# Patient Record
Sex: FEMALE | Race: WHITE | NOT HISPANIC OR LATINO | Employment: OTHER | URBAN - METROPOLITAN AREA
[De-identification: names, ages, dates, MRNs, and addresses within clinical notes are randomized per-mention and may not be internally consistent; named-entity substitution may affect disease eponyms.]

---

## 2017-03-08 ENCOUNTER — HOSPITAL ENCOUNTER (OUTPATIENT)
Facility: MEDICAL CENTER | Age: 52
End: 2017-03-08
Attending: OBSTETRICS & GYNECOLOGY
Payer: COMMERCIAL

## 2017-03-08 PROCEDURE — 88175 CYTOPATH C/V AUTO FLUID REDO: CPT

## 2017-03-08 PROCEDURE — 87624 HPV HI-RISK TYP POOLED RSLT: CPT

## 2017-03-09 LAB
CYTOLOGY REG CYTOL: NORMAL
HPV HR 12 DNA CVX QL NAA+PROBE: NEGATIVE
HPV16 DNA SPEC QL NAA+PROBE: NEGATIVE
HPV18 DNA SPEC QL NAA+PROBE: NEGATIVE
SPECIMEN SOURCE: NORMAL

## 2017-04-19 ENCOUNTER — HOSPITAL ENCOUNTER (OUTPATIENT)
Dept: LAB | Facility: MEDICAL CENTER | Age: 52
End: 2017-04-19
Attending: NURSE PRACTITIONER
Payer: COMMERCIAL

## 2017-04-19 LAB — T3FREE SERPL-MCNC: 2.68 PG/ML (ref 2.4–4.2)

## 2017-04-19 PROCEDURE — 80061 LIPID PANEL: CPT

## 2017-04-19 PROCEDURE — 84481 FREE ASSAY (FT-3): CPT

## 2017-04-19 PROCEDURE — 83036 HEMOGLOBIN GLYCOSYLATED A1C: CPT

## 2017-04-19 PROCEDURE — 36415 COLL VENOUS BLD VENIPUNCTURE: CPT

## 2017-04-19 PROCEDURE — 86140 C-REACTIVE PROTEIN: CPT

## 2017-04-19 PROCEDURE — 82306 VITAMIN D 25 HYDROXY: CPT

## 2017-04-19 PROCEDURE — 84479 ASSAY OF THYROID (T3 OR T4): CPT

## 2017-04-19 PROCEDURE — 80053 COMPREHEN METABOLIC PANEL: CPT

## 2017-04-19 PROCEDURE — 83540 ASSAY OF IRON: CPT

## 2017-04-19 PROCEDURE — 84443 ASSAY THYROID STIM HORMONE: CPT

## 2017-04-19 PROCEDURE — 84439 ASSAY OF FREE THYROXINE: CPT

## 2017-04-19 PROCEDURE — 81003 URINALYSIS AUTO W/O SCOPE: CPT

## 2017-04-19 PROCEDURE — 85025 COMPLETE CBC W/AUTO DIFF WBC: CPT

## 2017-04-20 LAB
25(OH)D3 SERPL-MCNC: 28 NG/ML (ref 30–100)
ALBUMIN SERPL BCP-MCNC: 4.8 G/DL (ref 3.2–4.9)
ALBUMIN/GLOB SERPL: 1.5 G/DL
ALP SERPL-CCNC: 46 U/L (ref 30–99)
ALT SERPL-CCNC: 12 U/L (ref 2–50)
ANION GAP SERPL CALC-SCNC: 11 MMOL/L (ref 0–11.9)
APPEARANCE UR: CLEAR
AST SERPL-CCNC: 19 U/L (ref 12–45)
BASOPHILS # BLD AUTO: 0.9 % (ref 0–1.8)
BASOPHILS # BLD: 0.05 K/UL (ref 0–0.12)
BILIRUB SERPL-MCNC: 1.4 MG/DL (ref 0.1–1.5)
BILIRUB UR QL STRIP.AUTO: NEGATIVE
BUN SERPL-MCNC: 6 MG/DL (ref 8–22)
CALCIUM SERPL-MCNC: 9.8 MG/DL (ref 8.5–10.5)
CHLORIDE SERPL-SCNC: 103 MMOL/L (ref 96–112)
CHOLEST SERPL-MCNC: 223 MG/DL (ref 100–199)
CO2 SERPL-SCNC: 23 MMOL/L (ref 20–33)
COLOR UR: COLORLESS
CREAT SERPL-MCNC: 0.69 MG/DL (ref 0.5–1.4)
CRP SERPL HS-MCNC: 0.13 MG/DL (ref 0–0.75)
EOSINOPHIL # BLD AUTO: 0.04 K/UL (ref 0–0.51)
EOSINOPHIL NFR BLD: 0.7 % (ref 0–6.9)
ERYTHROCYTE [DISTWIDTH] IN BLOOD BY AUTOMATED COUNT: 48.4 FL (ref 35.9–50)
EST. AVERAGE GLUCOSE BLD GHB EST-MCNC: 108 MG/DL
GFR SERPL CREATININE-BSD FRML MDRD: >60 ML/MIN/1.73 M 2
GLOBULIN SER CALC-MCNC: 3.1 G/DL (ref 1.9–3.5)
GLUCOSE SERPL-MCNC: 89 MG/DL (ref 65–99)
GLUCOSE UR STRIP.AUTO-MCNC: NEGATIVE MG/DL
HBA1C MFR BLD: 5.4 % (ref 0–5.6)
HCT VFR BLD AUTO: 43.5 % (ref 37–47)
HDLC SERPL-MCNC: 88 MG/DL
HGB BLD-MCNC: 14.1 G/DL (ref 12–16)
IMM GRANULOCYTES # BLD AUTO: 0.01 K/UL (ref 0–0.11)
IMM GRANULOCYTES NFR BLD AUTO: 0.2 % (ref 0–0.9)
IRON SERPL-MCNC: 101 UG/DL (ref 40–170)
KETONES UR STRIP.AUTO-MCNC: NEGATIVE MG/DL
LDLC SERPL CALC-MCNC: 123 MG/DL
LEUKOCYTE ESTERASE UR QL STRIP.AUTO: NEGATIVE
LYMPHOCYTES # BLD AUTO: 1.43 K/UL (ref 1–4.8)
LYMPHOCYTES NFR BLD: 25.9 % (ref 22–41)
MCH RBC QN AUTO: 31.2 PG (ref 27–33)
MCHC RBC AUTO-ENTMCNC: 32.4 G/DL (ref 33.6–35)
MCV RBC AUTO: 96.2 FL (ref 81.4–97.8)
MICRO URNS: NORMAL
MONOCYTES # BLD AUTO: 0.25 K/UL (ref 0–0.85)
MONOCYTES NFR BLD AUTO: 4.5 % (ref 0–13.4)
NEUTROPHILS # BLD AUTO: 3.74 K/UL (ref 2–7.15)
NEUTROPHILS NFR BLD: 67.8 % (ref 44–72)
NITRITE UR QL STRIP.AUTO: NEGATIVE
NRBC # BLD AUTO: 0 K/UL
NRBC BLD AUTO-RTO: 0 /100 WBC
PH UR STRIP.AUTO: 7 [PH]
PLATELET # BLD AUTO: 256 K/UL (ref 164–446)
PMV BLD AUTO: 12.5 FL (ref 9–12.9)
POTASSIUM SERPL-SCNC: 4.3 MMOL/L (ref 3.6–5.5)
PROT SERPL-MCNC: 7.9 G/DL (ref 6–8.2)
PROT UR QL STRIP: NEGATIVE MG/DL
RBC # BLD AUTO: 4.52 M/UL (ref 4.2–5.4)
RBC UR QL AUTO: NEGATIVE
SODIUM SERPL-SCNC: 137 MMOL/L (ref 135–145)
SP GR UR STRIP.AUTO: 1
T3RU NFR SERPL: 37 % (ref 28–41)
T4 FREE SERPL-MCNC: 0.76 NG/DL (ref 0.53–1.43)
T4 SERPL-MCNC: 5.9 UG/DL (ref 4–12)
TRIGL SERPL-MCNC: 60 MG/DL (ref 0–149)
TSH SERPL DL<=0.005 MIU/L-ACNC: 1.76 UIU/ML (ref 0.3–3.7)
WBC # BLD AUTO: 5.5 K/UL (ref 4.8–10.8)

## 2017-06-08 ENCOUNTER — HOSPITAL ENCOUNTER (OUTPATIENT)
Dept: RADIOLOGY | Facility: MEDICAL CENTER | Age: 52
End: 2017-06-08
Attending: OBSTETRICS & GYNECOLOGY
Payer: COMMERCIAL

## 2017-06-08 DIAGNOSIS — Z13.9 SCREENING: ICD-10-CM

## 2017-06-08 PROCEDURE — 77063 BREAST TOMOSYNTHESIS BI: CPT

## 2018-04-16 ENCOUNTER — OFFICE VISIT (OUTPATIENT)
Dept: OTHER | Facility: IMAGING CENTER | Age: 53
End: 2018-04-16
Payer: COMMERCIAL

## 2018-04-16 VITALS
OXYGEN SATURATION: 100 % | WEIGHT: 123.68 LBS | BODY MASS INDEX: 18.74 KG/M2 | SYSTOLIC BLOOD PRESSURE: 104 MMHG | TEMPERATURE: 98.3 F | HEIGHT: 68 IN | RESPIRATION RATE: 14 BRPM | HEART RATE: 66 BPM | DIASTOLIC BLOOD PRESSURE: 72 MMHG

## 2018-04-16 DIAGNOSIS — G47.00 INSOMNIA, UNSPECIFIED TYPE: ICD-10-CM

## 2018-04-16 DIAGNOSIS — Z00.00 HEALTHCARE MAINTENANCE: ICD-10-CM

## 2018-04-16 DIAGNOSIS — E55.9 VITAMIN D DEFICIENCY: ICD-10-CM

## 2018-04-16 DIAGNOSIS — Z80.8 FAMILY HISTORY OF MELANOMA: ICD-10-CM

## 2018-04-16 DIAGNOSIS — A60.00 GENITAL HERPES SIMPLEX, UNSPECIFIED SITE: ICD-10-CM

## 2018-04-16 DIAGNOSIS — E78.5 DYSLIPIDEMIA: ICD-10-CM

## 2018-04-16 DIAGNOSIS — R53.83 FATIGUE, UNSPECIFIED TYPE: ICD-10-CM

## 2018-04-16 PROCEDURE — 99204 OFFICE O/P NEW MOD 45 MIN: CPT | Performed by: INTERNAL MEDICINE

## 2018-04-16 RX ORDER — VALACYCLOVIR HYDROCHLORIDE 1 G/1
1000 TABLET, FILM COATED ORAL
Refills: 3 | COMMUNITY
Start: 2018-03-17 | End: 2019-08-02 | Stop reason: SDUPTHER

## 2018-04-16 ASSESSMENT — PATIENT HEALTH QUESTIONNAIRE - PHQ9: CLINICAL INTERPRETATION OF PHQ2 SCORE: 0

## 2018-04-16 NOTE — PROGRESS NOTES
Chief Complaint   Patient presents with   • Establish Care   • Herpes     daily breakouts    • Fatigue     tired between 1pm to 4pm, requires daily nap    • Anxiety     x 6 years, worse with care of her mother      Sydney Mckeon is a 52 y.o. female who usually sees Dr. Barton  presents today to establish care at [facility] and to discuss herpes. Previous seen Dr. Boyer and Dr. Babb.     HPI:  Genital herpes simplex  Chronic condition. Previously diagnosed by culture per patient. Patient has been on daily prophylactic valacyclovir for last 1-2 years. She continues to have continuous breakouts in spite of this. Patient states that she had seen her gynecologist 1-2 months ago. Lesions are itchy and tingling. No drainage or open lesions. Seems to be worse around her period. Patient has tried taking oral lysine supplement in the past unsure of the dosage. She has not tried any topical products.    Insomnia  Reports difficulty staying asleep- wakes up between 12 to 1:00 and on has difficulty going back to bed. Goes to bed around 8:52 PM and wakes 5:30 AM. Reports feeling tired and nonrefreshed in morning. Reports daytime fatigue - will take a nap when she can around 12 or 1 PM.  Pt exercises Yes. Time of day morning  Consumes caffeine: Yes. Last intake about morning  Possible factors contributing to fatigue include: stress or not getting enough sleep, hot flashes  Current treatment: melatonin (10-20mg) 1/2 hr before bed. Was started on progestin by gynecologist to help with sleep - feels it has improved sleep quality in the last 2 weeks. Has been having hot flashes.      Family history of melanoma  Father has a history of melanoma. Patient is seeing a dermatologist for annual skin exam.     Vitamin D deficiency  Patient has been taking a vitamin D supplement - unsure of dosage.     Healthcare maintenance  Immunizations: annual flu shot  PAP - last few months ago, Dr. Dubois, denies past hx of abnormal  paps  Colonoscopy - never, prefers annual fecal occult testing.  Lifestyle  Exercise: pilates, hiking weekends, walking dog 2-3miles, pending weather  Relaxation/Sleep: bunco group, pilates, walking dog; sleep issue  Diet:  Light breakfast/lunch - smoothies, lunch almond butter and apples, dinner enchiladas with onions peppers, zucchini, beverages - water, coffee. Smoothie - kale/blueberries  Relationships: Lives with daughter and mother who has dementia (pt is caretaker), Jorden, jones retriever.  Stressors: taking care of mother, errands, bills, cleaning    Current medicines (including changes today)  Current Outpatient Prescriptions   Medication Sig Dispense Refill   • progesterone (PROMETRIUM) 100 MG Cap Take 100 mg by mouth every day.     • MELATONIN PO Take  by mouth.     • Multiple Vitamins-Minerals (MULTIVITAMIN ADULT PO) Take  by mouth.     • vitamin D (CHOLECALCIFEROL) 1000 UNIT Tab Take 1,000 Units by mouth every day.     • valacyclovir (VALTREX) 1 GM Tab Take 1,000 mg by mouth every day.  3     No current facility-administered medications for this visit.      She  has a past medical history of Herpes genitalia.  She  has a past surgical history that includes pr enlarge breast with implant and appendectomy.  Social History   Substance Use Topics   • Smoking status: Never Smoker   • Smokeless tobacco: Never Used   • Alcohol use Yes      Comment: occasional wine      Family History   Problem Relation Age of Onset   • Cancer Mother 50     breast   • Dementia Mother    • Diabetes Father    • Other Father      glioblastoma   • Thyroid Father    • Cancer Father      skin cancer   • Diabetes Maternal Grandmother    • No Known Problems Daughter      Family Status   Relation Status   • Mother Alive   • Father  at age 77   • Sister Alive   • Maternal Grandmother    • Maternal Grandfather    • Paternal Grandmother    • Paternal Grandfather    • Daughter Alive     Social  History     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 Generalized fatigue.  HENT: Negative for ear pain, nosebleeds, congestion, sore throat and neck pain.    Eyes: Negative for blurred vision.   Respiratory: Negative for cough, sputum production, shortness of breath and wheezing.    Cardiovascular: Negative for chest pain, palpitations, orthopnea and leg swelling.   Gastrointestinal: Negative for heartburn, nausea, vomiting and abdominal pain.   Genitourinary: Negative for dysuria, urgency and frequency.   Musculoskeletal: Negative for myalgias, back pain and joint pain.   Skin: Negative for rash and itching.   Neurological: Negative for dizziness, tingling, tremors, sensory change, focal weakness and headache - perfumes and toxic smells can trigger headaches.   Endo/Heme/Allergies: Does not bruise/bleed easily. Periods are starting to be more erratic. No missed periods  Psychiatric/Behavioral: Negative for depression. Does have anxiety over the last 6 yrs due to caring for mother   All other systems reviewed and are negative except as in HPI.    Labs reviewed with patient:  Lab Results   Component Value Date/Time    WBC 5.5 04/19/2017 08:19 AM    RBC 4.52 04/19/2017 08:19 AM    HEMOGLOBIN 14.1 04/19/2017 08:19 AM    HEMATOCRIT 43.5 04/19/2017 08:19 AM    MCV 96.2 04/19/2017 08:19 AM    MCH 31.2 04/19/2017 08:19 AM    MCHC 32.4 (L) 04/19/2017 08:19 AM    MPV 12.5 04/19/2017 08:19 AM    NEUTSPOLYS 67.80 04/19/2017 08:19 AM    LYMPHOCYTES 25.90 04/19/2017 08:19 AM    MONOCYTES 4.50 04/19/2017 08:19 AM    EOSINOPHILS 0.70 04/19/2017 08:19 AM    BASOPHILS 0.90 04/19/2017 08:19 AM      Lab Results   Component Value Date/Time    SODIUM 137 04/19/2017 08:19 AM    POTASSIUM 4.3 04/19/2017 08:19 AM    CHLORIDE 103 04/19/2017 08:19 AM    CO2 23 04/19/2017 08:19 AM    GLUCOSE 89 04/19/2017 08:19 AM    BUN 6 (L) 04/19/2017 08:19 AM     "CREATININE 0.69 04/19/2017 08:19 AM    ALKPHOSPHAT 46 04/19/2017 08:19 AM    ASTSGOT 19 04/19/2017 08:19 AM    ALTSGPT 12 04/19/2017 08:19 AM    TBILIRUBIN 1.4 04/19/2017 08:19 AM     Lab Results   Component Value Date/Time    CHOLSTRLTOT 223 (H) 04/19/2017 08:19 AM    CHOLSTRLTOT 200 (H) 03/17/2015 10:09 AM     Lab Results   Component Value Date/Time     (H) 04/19/2017 08:19 AM     (H) 03/17/2015 10:09 AM     Lab Results   Component Value Date/Time    HDL 88 04/19/2017 08:19 AM    HDL 73 03/17/2015 10:09 AM     Lab Results   Component Value Date/Time    TRIGLYCERIDE 60 04/19/2017 08:19 AM    TRIGLYCERIDE 61 03/17/2015 10:09 AM     Lab Results   Component Value Date/Time    HBA1C 5.4 04/19/2017 08:19 AM    HBA1C 5.3 03/31/2015 07:23 AM     Lab Results   Component Value Date/Time    TSHULTRASEN 1.760 04/19/2017 08:19 AM     Lab Results   Component Value Date/Time    FREET4 0.76 04/19/2017 08:19 AM    FREET4 0.83 10/18/2013 08:45 AM     Lab Results   Component Value Date/Time    25HYDROXY 28 (L) 04/19/2017 08:19 AM    25HYDROXY 33 03/31/2015 07:23 AM     No components found for: VITB12  No results found for: FOLATE  Lab Results   Component Value Date/Time    25HYDROXY 28 (L) 04/19/2017 08:19 AM    25HYDROXY 33 03/31/2015 07:23 AM     No components found for: PROST  No results found for: TESTOSTERONE  No results found for: URICACID     Objective:   Blood pressure 104/72, pulse 66, temperature 36.8 °C (98.3 °F), resp. rate 14, height 1.727 m (5' 8\"), weight 56.1 kg (123 lb 10.9 oz), SpO2 100 %, not currently breastfeeding. Body mass index is 18.81 kg/m².  Physical Exam:  Constitutional: Alert, no distress.  Skin: Warm, dry, good turgor, no rashes in visible areas.  Eye: Equal, round and reactive, conjunctiva clear, lids normal.  ENMT: Lips without lesions, good dentition, oropharynx clear.  Neck: Trachea midline, no masses, no thyromegaly.  Respiratory: Unlabored respiratory effort, lungs clear to " auscultation, no wheezes, no ronchi.  Cardiovascular: Normal S1, S2, no murmur, no edema.  Abdomen: Soft, non-tender, no masses, no hepatosplenomegaly.  Psych: Alert and oriented x3, normal affect and mood.    Assessment and Plan:   The following treatment plan was discussed  1. Genital herpes simplex, unspecified site  Having breakout in spite of chronic suppressive antiviral medications/valcyclovir. Discussed managing stress and improving sleep to reduce recurrence of outbreak. Consider daily use of Lysine supplementation, 1000 mg TID and discussed trial of zinc sulfate topical cream and propolis for acute outbreak. Encouraged her to schedule in mind-body therapies such as yoga and meditation.    2. Insomnia, unspecified type  Overall improved since starting progestin through gynecology. Discussed sleep hygiene techniques. Continue melatonin as needed for now. She is on a high dose currently which could potentially disrupt sleep in the long-term. Would advise her to decrease to lowest effective dose.     3. Vitamin D deficiency  Previously low. Unsure of current dosage. Discussed importance of vitamin D in various metabolic processes.  - VITAMIN D,25 HYDROXY; Future    4. Fatigue, unspecified type  Likely due to combination of factors, inadequate sleep, vasomotor symptoms (recently addressed by gynecology). Check CBC.  - CBC WITH DIFFERENTIAL; Future  - VITAMIN B12; Future    5. Dyslipidemia  ASCVD risk score is 0.6%. Not in a statin benefit group. Advised attending Food is Medicine lectures - intro lecture.  We recommend decreasing saturated fat which are found in meats that come from a cow or pig, and found in creams, cheeses, butter, naylor, and fried foods. We recommend more vegetables, fruits, fish, nuts, olive oil and exercising 5 times a week for 30 minutes.    6. Family history of melanoma  Continue to see dermatology for annual skin exams.    7. Healthcare maintenance  Recheck labs. Prefers annual hemoccult  testing for colon cancer screening - discussed benefits of colonoscopy for  screening.  - LIPID PROFILE; Future  - TSH WITH REFLEX TO FT4; Future  - CBC WITH DIFFERENTIAL; Future  - COMP METABOLIC PANEL; Future    Records requested of PAP  Followup: Return in about 2 months (around 6/16/2018) for Lab review.    Spent 60 minutes in face-to-tace patient contact in which greater than 50% of the visit was spent in counseling and coordination of care as above. We also reviewed PMH, family history, and each of her chronic diagnoses in regards to current management, specialty physicians, symptom control, and future planning.  Please refer to assessment and plan/discussion/recommendations for additional details.          I have worked with consultants from the vendor as well as technical experts from [employer] to optimize the interface. I have made every reasonable attempt to correct obvious errors, but I expect that there are errors of grammar and possibly content that I did not discover before finalizing the note.

## 2018-04-16 NOTE — ASSESSMENT & PLAN NOTE
Chronic condition. Previously diagnosed by culture per patient. Patient has been on daily prophylactic valacyclovir for last 1-2 years. She continues to have continuous breakouts in spite of this. Patient states that she had seen her gynecologist 1-2 months ago. Lesions are itchy and tingling. No drainage or open lesions. Seems to be worse around her period. Patient has tried taking oral lysine supplement in the past unsure of the dosage. She has not tried any topical products.

## 2018-04-16 NOTE — ASSESSMENT & PLAN NOTE
Reports difficulty staying asleep- wakes up between 12 to 1:00 and on has difficulty going back to bed. Goes to bed around 8:52 PM and wakes 5:30 AM. Reports feeling tired and nonrefreshed in morning. Reports daytime fatigue - will take a nap when she can around 12 or 1 PM.  Pt exercises Yes. Time of day morning  Consumes caffeine: Yes. Last intake about morning  Possible factors contributing to fatigue include: stress or not getting enough sleep, hot flashes  Current treatment: melatonin (10-20mg) 1/2 hr before bed. Was started on progestin by gynecologist to help with sleep - feels it has improved sleep quality in the last 2 weeks. Has been having hot flashes.

## 2018-04-16 NOTE — ASSESSMENT & PLAN NOTE
Immunizations: annual flu shot  PAP - last few months ago, Dr. Dubois, denies past hx of abnormal paps  Colonoscopy - never, prefers annual fecal occult testing.  Lifestyle  Exercise: pilates, hiking weekends, walking dog 2-3miles, pending weather  Relaxation/Sleep: bunco group, pilates, walking dog; sleep issue  Diet:  Light breakfast/lunch - smoothies, lunch almond butter and apples, dinner enchiladas with onions peppers, zucchini, beverages - water, coffee. Smoothie - kale/blueberries  Relationships: Lives with daughter and mother who has dementia (pt is caretaker), Jorden, jones retriever.  Stressors: taking care of mother, errands, bills, cleaning

## 2018-05-11 ENCOUNTER — HOSPITAL ENCOUNTER (OUTPATIENT)
Dept: LAB | Facility: MEDICAL CENTER | Age: 53
End: 2018-05-11
Attending: INTERNAL MEDICINE
Payer: COMMERCIAL

## 2018-05-11 DIAGNOSIS — Z00.00 HEALTHCARE MAINTENANCE: ICD-10-CM

## 2018-05-11 DIAGNOSIS — R53.83 FATIGUE, UNSPECIFIED TYPE: ICD-10-CM

## 2018-05-11 DIAGNOSIS — E55.9 VITAMIN D DEFICIENCY: ICD-10-CM

## 2018-05-11 LAB
25(OH)D3 SERPL-MCNC: 50 NG/ML (ref 30–100)
ALBUMIN SERPL BCP-MCNC: 4.2 G/DL (ref 3.2–4.9)
ALBUMIN/GLOB SERPL: 1.3 G/DL
ALP SERPL-CCNC: 36 U/L (ref 30–99)
ALT SERPL-CCNC: 12 U/L (ref 2–50)
ANION GAP SERPL CALC-SCNC: 5 MMOL/L (ref 0–11.9)
AST SERPL-CCNC: 17 U/L (ref 12–45)
BASOPHILS # BLD AUTO: 0.8 % (ref 0–1.8)
BASOPHILS # BLD: 0.06 K/UL (ref 0–0.12)
BILIRUB SERPL-MCNC: 1.3 MG/DL (ref 0.1–1.5)
BUN SERPL-MCNC: 9 MG/DL (ref 8–22)
CALCIUM SERPL-MCNC: 9.4 MG/DL (ref 8.5–10.5)
CHLORIDE SERPL-SCNC: 105 MMOL/L (ref 96–112)
CHOLEST SERPL-MCNC: 214 MG/DL (ref 100–199)
CO2 SERPL-SCNC: 28 MMOL/L (ref 20–33)
CREAT SERPL-MCNC: 0.7 MG/DL (ref 0.5–1.4)
EOSINOPHIL # BLD AUTO: 0.06 K/UL (ref 0–0.51)
EOSINOPHIL NFR BLD: 0.8 % (ref 0–6.9)
ERYTHROCYTE [DISTWIDTH] IN BLOOD BY AUTOMATED COUNT: 45.5 FL (ref 35.9–50)
GLOBULIN SER CALC-MCNC: 3.3 G/DL (ref 1.9–3.5)
GLUCOSE SERPL-MCNC: 84 MG/DL (ref 65–99)
HCT VFR BLD AUTO: 44.9 % (ref 37–47)
HDLC SERPL-MCNC: 79 MG/DL
HGB BLD-MCNC: 14.4 G/DL (ref 12–16)
IMM GRANULOCYTES # BLD AUTO: 0.02 K/UL (ref 0–0.11)
IMM GRANULOCYTES NFR BLD AUTO: 0.3 % (ref 0–0.9)
LDLC SERPL CALC-MCNC: 116 MG/DL
LYMPHOCYTES # BLD AUTO: 1.89 K/UL (ref 1–4.8)
LYMPHOCYTES NFR BLD: 26.5 % (ref 22–41)
MCH RBC QN AUTO: 31.5 PG (ref 27–33)
MCHC RBC AUTO-ENTMCNC: 32.1 G/DL (ref 33.6–35)
MCV RBC AUTO: 98.2 FL (ref 81.4–97.8)
MONOCYTES # BLD AUTO: 0.35 K/UL (ref 0–0.85)
MONOCYTES NFR BLD AUTO: 4.9 % (ref 0–13.4)
NEUTROPHILS # BLD AUTO: 4.74 K/UL (ref 2–7.15)
NEUTROPHILS NFR BLD: 66.7 % (ref 44–72)
NRBC # BLD AUTO: 0 K/UL
NRBC BLD-RTO: 0 /100 WBC
PLATELET # BLD AUTO: 235 K/UL (ref 164–446)
PMV BLD AUTO: 12.5 FL (ref 9–12.9)
POTASSIUM SERPL-SCNC: 4.1 MMOL/L (ref 3.6–5.5)
PROT SERPL-MCNC: 7.5 G/DL (ref 6–8.2)
RBC # BLD AUTO: 4.57 M/UL (ref 4.2–5.4)
SODIUM SERPL-SCNC: 138 MMOL/L (ref 135–145)
TRIGL SERPL-MCNC: 93 MG/DL (ref 0–149)
TSH SERPL DL<=0.005 MIU/L-ACNC: 1.89 UIU/ML (ref 0.38–5.33)
VIT B12 SERPL-MCNC: 829 PG/ML (ref 211–911)
WBC # BLD AUTO: 7.1 K/UL (ref 4.8–10.8)

## 2018-05-11 PROCEDURE — 80061 LIPID PANEL: CPT

## 2018-05-11 PROCEDURE — 36415 COLL VENOUS BLD VENIPUNCTURE: CPT

## 2018-05-11 PROCEDURE — 82607 VITAMIN B-12: CPT

## 2018-05-11 PROCEDURE — 82306 VITAMIN D 25 HYDROXY: CPT

## 2018-05-11 PROCEDURE — 85025 COMPLETE CBC W/AUTO DIFF WBC: CPT

## 2018-05-11 PROCEDURE — 84443 ASSAY THYROID STIM HORMONE: CPT

## 2018-05-11 PROCEDURE — 80053 COMPREHEN METABOLIC PANEL: CPT

## 2018-06-13 ENCOUNTER — HOSPITAL ENCOUNTER (OUTPATIENT)
Dept: RADIOLOGY | Facility: MEDICAL CENTER | Age: 53
End: 2018-06-13
Attending: NURSE PRACTITIONER
Payer: COMMERCIAL

## 2018-06-13 DIAGNOSIS — N92.0 EXCESSIVE OR FREQUENT MENSTRUATION: ICD-10-CM

## 2018-06-13 DIAGNOSIS — N84.0 POLYP OF CORPUS UTERI: ICD-10-CM

## 2018-06-13 PROCEDURE — 76830 TRANSVAGINAL US NON-OB: CPT

## 2018-07-12 ENCOUNTER — OFFICE VISIT (OUTPATIENT)
Dept: OTHER | Facility: IMAGING CENTER | Age: 53
End: 2018-07-12
Payer: COMMERCIAL

## 2018-07-12 VITALS
RESPIRATION RATE: 14 BRPM | SYSTOLIC BLOOD PRESSURE: 114 MMHG | OXYGEN SATURATION: 100 % | BODY MASS INDEX: 18.78 KG/M2 | HEIGHT: 68 IN | WEIGHT: 123.9 LBS | HEART RATE: 75 BPM | TEMPERATURE: 98.7 F | DIASTOLIC BLOOD PRESSURE: 68 MMHG

## 2018-07-12 DIAGNOSIS — M25.512 ACUTE PAIN OF LEFT SHOULDER: ICD-10-CM

## 2018-07-12 DIAGNOSIS — J06.9 VIRAL UPPER RESPIRATORY TRACT INFECTION: ICD-10-CM

## 2018-07-12 DIAGNOSIS — F43.21 ADJUSTMENT DISORDER WITH DEPRESSED MOOD: ICD-10-CM

## 2018-07-12 PROCEDURE — 99214 OFFICE O/P EST MOD 30 MIN: CPT | Performed by: INTERNAL MEDICINE

## 2018-07-12 ASSESSMENT — PAIN SCALES - GENERAL: PAINLEVEL: NO PAIN

## 2018-07-12 NOTE — PROGRESS NOTES
Chief Complaint   Patient presents with   • Cough     x 5 days. dry, sporadic, heaviness in chest     Subjective:   Sydney Mckeon is a 52 y.o. female here today for multiple problems as listed below.      Patient complains of cough that started 5 days ago. Started also with sore throat and congestion and sneezing, which have resolved. Just has dry cough now and rhinorrhea. No fever or chills. No ear pain, facial tenderness or swollen glands. No wheezing or SOB.  Patient has been travelling in the past few weeks. Has tried OTC meds including: Mucinex, Hanh's cold, Cold Calm. OTC meds tried x 1 day so unsure if symptoms are better or worse.     Patient also notes she has been more depressed and irritable in the last few months. Sleep has been good - uses melatonin occasionally. Falls asleep okay but occasionally has issues with staying asleep. Interest/motivation are affected when mood is depressed. She feels guilty about yelling at her daughter when irritable. Energy levels are overall okay and no changes in appetite. Denies SI/HI. (Denies wishing to be dead, thinking about death, intent to commit suicide, previous suicide attempt). Would like to consider non-pharmacologic therapies and homeopathic medication.      Complains of mild shoulder discomfort in lateral shoulder in the last week. Has been doing pilates and notices that she cannot do some of the arm exercises due to pain. No specific injury or event preceding pain. No pain at rest, no pain with overhead activities, no neck pain.  No weakness in arm, instability, night pain, numbness/tingling down arm. Has not tried methods to alleviate the pain.     Current medicines (including changes today)  Current Outpatient Prescriptions   Medication Sig Dispense Refill   • GuaiFENesin (MUCINEX CHILDRENS PO) Take  by mouth.     • MELATONIN PO Take  by mouth.     • Multiple Vitamins-Minerals (MULTIVITAMIN ADULT PO) Take  by mouth.     • vitamin D (CHOLECALCIFEROL)  "1000 UNIT Tab Take 1,000 Units by mouth every day.     • valacyclovir (VALTREX) 1 GM Tab Take 1,000 mg by mouth every day.  3     No current facility-administered medications for this visit.      She  has a past medical history of Herpes genitalia.    ROS  No chest pain, no shortness of breath, no abdominal pain, no diarrhea/constipation, no urinary symptoms.     Objective:     Blood pressure 114/68, pulse 75, temperature 37.1 °C (98.7 °F), resp. rate 14, height 1.727 m (5' 8\"), weight 56.2 kg (123 lb 14.4 oz), SpO2 100 %. Body mass index is 18.84 kg/m².   Physical Exam:  Alert, oriented in no acute distress.  Eye contact is good, speech goal directed, affect calm  HEENT: conjunctiva non-injected, sclera non-icteric.  Pinna normal. TM pearly gray. Oral mucous membranes pink and moist with no lesions.  Neck: No adenopathy or masses in the neck or supraclavicular regions.  Lungs: clear to auscultation bilaterally with good excursion.  CV: regular rate and rhythm.  Ext: no edema, color normal, vascularity normal, temperature normal  Shoulder Exam: no swelling, tenderness, instability; ligaments intact, no bicipital groove tenderness, no impingements signs, remainder of shoulder exam is normal, ipsilateral elbow, wrist and hand exam is normal, contralateral shoulder exam is normal. Passive ROM intact to forward flexion, external rotation, internal rotation,   Rotator Cuff strength: Supraspinatus 5/5, Drop Arm Sign absent, External rotation 5/5, Lift off able.      Assessment and Plan:   The following treatment plan was discussed   1. Viral upper respiratory tract infection  Discussed conservative measures and supportive care. Treatments advised today in addition to orders above  include: Nasal irrigation/saline, OTC cough/cold product of patient's choice PRN and fluids and rest - discussed use of vitamin/supplements including vitamin C, zinc, elderberry, honey. Advised warm liquids and rest.   Followup for worsening " symptoms, difficulty breathing, lack of expected recovery, or should new symptoms or problems arise.     2. Adjustment disorder with depressed mood  Patient declines prescription medications. Discussed management of stress/anxiety with mind-body therapies - handout on guided imagery and breath work. Discussed other complementary therapies including diet modification and aromatherapy which may also help with her mood. Trial of saffron supplement x 4-6 weeks. Consider BH referral if symptoms not improved or worsening - follow-up in 1-2 months.     3. Acute pain of left shoulder  Discussed use of ice, rest, and NSAIDS. Avoiding exacerbating activities for the next 2-3 days. F/U if not improved or worsening - discussed acupuncture and/or PT as next step.      Followup: Return in about 2 months (around 9/12/2018) for follow-up with PCP.    Spent 25 minutes in face-to-tace patient contact in which greater than 50% of the visit was spent in counseling and coordination of care.  Please refer to assessment and plan/discussion/recommendations for additional details.        Please note that dictation has been dictated using voice recognition soft ware. I have made every reasonable attempt to correct obvious errors, but I expect that there are errors of grammar and possibly content that I did not discover before finalizing the note.

## 2018-08-22 ENCOUNTER — APPOINTMENT (OUTPATIENT)
Dept: ADMISSIONS | Facility: MEDICAL CENTER | Age: 53
End: 2018-08-22
Attending: OBSTETRICS & GYNECOLOGY
Payer: COMMERCIAL

## 2018-08-27 ENCOUNTER — HOSPITAL ENCOUNTER (OUTPATIENT)
Facility: MEDICAL CENTER | Age: 53
End: 2018-08-27
Attending: OBSTETRICS & GYNECOLOGY | Admitting: OBSTETRICS & GYNECOLOGY
Payer: COMMERCIAL

## 2018-08-27 VITALS
SYSTOLIC BLOOD PRESSURE: 118 MMHG | TEMPERATURE: 97.7 F | RESPIRATION RATE: 16 BRPM | BODY MASS INDEX: 18.94 KG/M2 | OXYGEN SATURATION: 99 % | HEIGHT: 68 IN | WEIGHT: 125 LBS | HEART RATE: 49 BPM | DIASTOLIC BLOOD PRESSURE: 72 MMHG

## 2018-08-27 LAB — B-HCG SERPL-ACNC: <0.6 MIU/ML (ref 0–5)

## 2018-08-27 PROCEDURE — 160041 HCHG SURGERY MINUTES - EA ADDL 1 MIN LEVEL 4: Performed by: OBSTETRICS & GYNECOLOGY

## 2018-08-27 PROCEDURE — 700102 HCHG RX REV CODE 250 W/ 637 OVERRIDE(OP)

## 2018-08-27 PROCEDURE — 502587 HCHG PACK, D&C: Performed by: OBSTETRICS & GYNECOLOGY

## 2018-08-27 PROCEDURE — 500448 HCHG DRESSING, TELFA 3X4: Performed by: OBSTETRICS & GYNECOLOGY

## 2018-08-27 PROCEDURE — 160048 HCHG OR STATISTICAL LEVEL 1-5: Performed by: OBSTETRICS & GYNECOLOGY

## 2018-08-27 PROCEDURE — 160035 HCHG PACU - 1ST 60 MINS PHASE I: Performed by: OBSTETRICS & GYNECOLOGY

## 2018-08-27 PROCEDURE — 88305 TISSUE EXAM BY PATHOLOGIST: CPT

## 2018-08-27 PROCEDURE — A9270 NON-COVERED ITEM OR SERVICE: HCPCS

## 2018-08-27 PROCEDURE — 160029 HCHG SURGERY MINUTES - 1ST 30 MINS LEVEL 4: Performed by: OBSTETRICS & GYNECOLOGY

## 2018-08-27 PROCEDURE — 160036 HCHG PACU - EA ADDL 30 MINS PHASE I: Performed by: OBSTETRICS & GYNECOLOGY

## 2018-08-27 PROCEDURE — 700101 HCHG RX REV CODE 250

## 2018-08-27 PROCEDURE — 160009 HCHG ANES TIME/MIN: Performed by: OBSTETRICS & GYNECOLOGY

## 2018-08-27 PROCEDURE — 160002 HCHG RECOVERY MINUTES (STAT): Performed by: OBSTETRICS & GYNECOLOGY

## 2018-08-27 PROCEDURE — 700111 HCHG RX REV CODE 636 W/ 250 OVERRIDE (IP)

## 2018-08-27 PROCEDURE — 84702 CHORIONIC GONADOTROPIN TEST: CPT

## 2018-08-27 RX ORDER — ONDANSETRON 2 MG/ML
4 INJECTION INTRAMUSCULAR; INTRAVENOUS EVERY 6 HOURS PRN
Status: DISCONTINUED | OUTPATIENT
Start: 2018-08-27 | End: 2018-08-27 | Stop reason: HOSPADM

## 2018-08-27 RX ORDER — LIDOCAINE HYDROCHLORIDE 10 MG/ML
INJECTION, SOLUTION INFILTRATION; PERINEURAL
Status: COMPLETED
Start: 2018-08-27 | End: 2018-08-27

## 2018-08-27 RX ORDER — BUPIVACAINE HYDROCHLORIDE AND EPINEPHRINE 2.5; 5 MG/ML; UG/ML
INJECTION, SOLUTION EPIDURAL; INFILTRATION; INTRACAUDAL; PERINEURAL
Status: DISCONTINUED | OUTPATIENT
Start: 2018-08-27 | End: 2018-08-27 | Stop reason: HOSPADM

## 2018-08-27 RX ORDER — OXYCODONE HYDROCHLORIDE 5 MG/1
5 TABLET ORAL
Status: DISCONTINUED | OUTPATIENT
Start: 2018-08-27 | End: 2018-08-27 | Stop reason: HOSPADM

## 2018-08-27 RX ORDER — LIDOCAINE HYDROCHLORIDE 10 MG/ML
0.5 INJECTION, SOLUTION INFILTRATION; PERINEURAL
Status: DISCONTINUED | OUTPATIENT
Start: 2018-08-27 | End: 2018-08-27 | Stop reason: HOSPADM

## 2018-08-27 RX ORDER — SCOLOPAMINE TRANSDERMAL SYSTEM 1 MG/1
PATCH, EXTENDED RELEASE TRANSDERMAL
Status: COMPLETED
Start: 2018-08-27 | End: 2018-08-27

## 2018-08-27 RX ORDER — ACETAMINOPHEN 500 MG
TABLET ORAL
Status: COMPLETED
Start: 2018-08-27 | End: 2018-08-27

## 2018-08-27 RX ORDER — SODIUM CHLORIDE, SODIUM LACTATE, POTASSIUM CHLORIDE, CALCIUM CHLORIDE 600; 310; 30; 20 MG/100ML; MG/100ML; MG/100ML; MG/100ML
INJECTION, SOLUTION INTRAVENOUS CONTINUOUS
Status: DISCONTINUED | OUTPATIENT
Start: 2018-08-27 | End: 2018-08-27 | Stop reason: HOSPADM

## 2018-08-27 RX ORDER — OXYCODONE HYDROCHLORIDE 5 MG/1
10 TABLET ORAL
Status: DISCONTINUED | OUTPATIENT
Start: 2018-08-27 | End: 2018-08-27 | Stop reason: HOSPADM

## 2018-08-27 RX ORDER — OXYCODONE HCL 5 MG/5 ML
SOLUTION, ORAL ORAL
Status: COMPLETED
Start: 2018-08-27 | End: 2018-08-27

## 2018-08-27 RX ORDER — GABAPENTIN 300 MG/1
CAPSULE ORAL
Status: COMPLETED
Start: 2018-08-27 | End: 2018-08-27

## 2018-08-27 RX ADMIN — OXYCODONE HYDROCHLORIDE 5 MG: 5 SOLUTION ORAL at 11:50

## 2018-08-27 RX ADMIN — GABAPENTIN 300 MG: 300 CAPSULE ORAL at 09:20

## 2018-08-27 RX ADMIN — ACETAMINOPHEN 1000 MG: 500 TABLET, FILM COATED ORAL at 09:20

## 2018-08-27 RX ADMIN — SODIUM CHLORIDE, SODIUM LACTATE, POTASSIUM CHLORIDE, CALCIUM CHLORIDE 1000 ML: 600; 310; 30; 20 INJECTION, SOLUTION INTRAVENOUS at 10:20

## 2018-08-27 RX ADMIN — LIDOCAINE HYDROCHLORIDE 0.2 ML: 10 INJECTION, SOLUTION INFILTRATION; PERINEURAL at 09:15

## 2018-08-27 RX ADMIN — SCOPOLAMINE 1 PATCH: 1 PATCH, EXTENDED RELEASE TRANSDERMAL at 09:19

## 2018-08-27 ASSESSMENT — PAIN SCALES - GENERAL
PAINLEVEL_OUTOF10: 2
PAINLEVEL_OUTOF10: 0
PAINLEVEL_OUTOF10: 1
PAINLEVEL_OUTOF10: 2
PAINLEVEL_OUTOF10: 0
PAINLEVEL_OUTOF10: 4

## 2018-08-27 NOTE — OR SURGEON
Immediate Post OP Note    PreOp Diagnosis: Fibroids, menorrhagia, dysmenorrhea    PostOp Diagnosis: same with endometrial polyp    Procedure(s):  HYSTEROSCOPY NOVASURE - Wound Class: Clean Contaminated  DILATION AND CURETTAGE - Wound Class: Clean Contaminated    Surgeon(s):  Amira Dubois M.D.    Anesthesiologist/Type of Anesthesia:  Anesthesiologist: Gail Haines M.D./General    Surgical Staff:  Circulator: Lotus Tafoya R.N.  Scrub Person: Sharon Avila    Specimens removed if any:  * No specimens in log *    Estimated Blood Loss: min    Findings: Uterus normal size. Cavity normal with small polyp    Complications: none        8/27/2018 11:36 AM Amira Dubois M.D.

## 2018-08-27 NOTE — OR NURSING
1117 Patient arrived from OR on [facility]. Report received from anesthesia and RN. LMA in place. Will continue to monitor.   1125 LMA out  1150 Patient tolerating water, denies nausea. Has 4/10 abdominal pain, oxycodone given as ordered.   1208  at bedside. Prescription already given to  by DR. Dubois  1253 Pt able to void, tolerated well. Requesting to get dressed at this time.   1300 Discharge instructions discussed with patient and family, copy given. Belongings with patient.   1310 Discharge criteria met. PIV out, Discharged via wheelcahir with  .

## 2018-08-27 NOTE — OP REPORT
DATE OF SERVICE:  08/27/2018    SURGEON:  Amira Dubois MD    ANESTHESIOLOGIST:  Gail Haines MD    PREOPERATIVE DIAGNOSIS:  Fibroids with menorrhagia.    POSTOPERATIVE DIAGNOSIS:  Fibroids with menorrhagia with endometrial polyps.    PROCEDURE:  NovaSure endometrial ablation with hysteroscopy and D and C.    FINDINGS:  On examination under anesthesia, the uterus was slightly irregular   and anteverted, but normal size.  On hysteroscopy, both tubal ostia were   visualized and were slightly enlarged.  There was a small wide-based polyp on   the right uterine sidewall.  The uterus sounded to 8.5 cm and the endocervix   to 3.5 cm.  A small amount of tissue was obtained on curettage.    PROCEDURE TECHNIQUE:  After induction of general anesthesia with LMA, the   patient was placed in dorsal lithotomy position and examined under anesthesia.    She was then prepped and draped in the usual sterile fashion and the bladder   was drained.  A weighted speculum was placed and the anterior lip of the   cervix was grasped with a single-tooth tenaculum.  A 10 mL of 0.25% Marcaine   were injected at 4 points paracervically.  The cervix was dilated slightly   with a small dilator and then the uterus was sounded to 8.5 cm.  The   endocervix was measured at 3.5 cm.  The cervix was dilated to 7 mm and a   diagnostic hysteroscope was placed in the uterine cavity, which was inspected   with the above findings.  The hysteroscope was removed and sharp curettage was   performed with return of a small amount of endometrial tissue.  The NovaSure   endometrial ablation device was set at a length of 5 cm inserted into the   uterine cavity and deployed to a width of 3.7 cm.  The uterine patency test   was passed uneventfully and the ablation was carried out over 72 seconds   uneventfully.  The NovaSure device was removed and repeat hysteroscopy   revealed good ablation of tissue throughout the cavity.  All the instruments   were  removed and hemostasis was noted to be good.  Sponge, needle, and   instrument counts were correct at the end of the procedure.  The patient was   awakened and taken in stable condition to the recovery room.    ESTIMATED BLOOD LOSS:  Minimal.    FLUIDS:  Crystalloid.    SPECIMENS:  Endometrium to pathology.    RECOMMENDATIONS AND DISPOSITION:  The patient will be discharged to home when   tolerating p.o. and urinating.  She is to follow up in 2 weeks and maintain   pelvic rest for 4 weeks.  She will be taking oxycodone and ibuprofen for pain.       ____________________________________     MD EILEEN CONKLIN / YVONNE    DD:  08/27/2018 11:24:52  DT:  08/27/2018 11:38:05    D#:  [id number]  Job#:  [id number]    cc: RAGHU ALLEN DO

## 2019-01-09 ENCOUNTER — APPOINTMENT (RX ONLY)
Dept: URBAN - METROPOLITAN AREA CLINIC 4 | Facility: CLINIC | Age: 54
Setting detail: DERMATOLOGY
End: 2019-01-09

## 2019-01-09 DIAGNOSIS — L82.1 OTHER SEBORRHEIC KERATOSIS: ICD-10-CM

## 2019-01-09 DIAGNOSIS — Z71.89 OTHER SPECIFIED COUNSELING: ICD-10-CM

## 2019-01-09 PROCEDURE — ? ADDITIONAL NOTES

## 2019-01-09 PROCEDURE — ? OBSERVATION AND MEASURE

## 2019-01-09 PROCEDURE — 99202 OFFICE O/P NEW SF 15 MIN: CPT

## 2019-01-09 PROCEDURE — ? COUNSELING

## 2019-01-09 ASSESSMENT — LOCATION ZONE DERM
LOCATION ZONE: FACE
LOCATION ZONE: SCALP

## 2019-01-09 ASSESSMENT — LOCATION SIMPLE DESCRIPTION DERM
LOCATION SIMPLE: SCALP
LOCATION SIMPLE: LEFT FOREHEAD

## 2019-01-09 ASSESSMENT — LOCATION DETAILED DESCRIPTION DERM
LOCATION DETAILED: RIGHT CENTRAL POSTAURICULAR SKIN
LOCATION DETAILED: LEFT INFERIOR LATERAL FOREHEAD

## 2019-01-09 NOTE — PROCEDURE: ADDITIONAL NOTES
Detail Level: Simple
Additional Notes: Recommend full body skin examination due to family history of malignant melanoma.

## 2019-01-09 NOTE — HPI: SKIN LESIONS
Is This A New Presentation, Or A Follow-Up?: Skin Lesions
How Severe Is Your Skin Lesion?: mild
Have Your Skin Lesions Been Treated?: not been treated
Additional History: Pt denies any changing moles, tender or bleeding spots.
Which Family Member (Optional)?: Father-passed away from

## 2019-04-08 ENCOUNTER — HOSPITAL ENCOUNTER (OUTPATIENT)
Dept: RADIOLOGY | Facility: MEDICAL CENTER | Age: 54
End: 2019-04-08
Attending: OBSTETRICS & GYNECOLOGY
Payer: COMMERCIAL

## 2019-04-08 DIAGNOSIS — Z12.31 VISIT FOR SCREENING MAMMOGRAM: ICD-10-CM

## 2019-04-08 PROCEDURE — 77063 BREAST TOMOSYNTHESIS BI: CPT

## 2019-06-17 ENCOUNTER — TELEPHONE (OUTPATIENT)
Dept: SCHEDULING | Facility: IMAGING CENTER | Age: 54
End: 2019-06-17

## 2019-07-10 ENCOUNTER — APPOINTMENT (RX ONLY)
Dept: URBAN - METROPOLITAN AREA CLINIC 35 | Facility: CLINIC | Age: 54
Setting detail: DERMATOLOGY
End: 2019-07-10

## 2019-07-10 DIAGNOSIS — Z71.89 OTHER SPECIFIED COUNSELING: ICD-10-CM

## 2019-07-10 DIAGNOSIS — D22 MELANOCYTIC NEVI: ICD-10-CM

## 2019-07-10 DIAGNOSIS — L81.4 OTHER MELANIN HYPERPIGMENTATION: ICD-10-CM

## 2019-07-10 DIAGNOSIS — L82.1 OTHER SEBORRHEIC KERATOSIS: ICD-10-CM

## 2019-07-10 DIAGNOSIS — D485 NEOPLASM OF UNCERTAIN BEHAVIOR OF SKIN: ICD-10-CM

## 2019-07-10 PROBLEM — D48.5 NEOPLASM OF UNCERTAIN BEHAVIOR OF SKIN: Status: ACTIVE | Noted: 2019-07-10

## 2019-07-10 PROBLEM — D22.4 MELANOCYTIC NEVI OF SCALP AND NECK: Status: ACTIVE | Noted: 2019-07-10

## 2019-07-10 PROCEDURE — ? COUNSELING

## 2019-07-10 PROCEDURE — 11104 PUNCH BX SKIN SINGLE LESION: CPT

## 2019-07-10 PROCEDURE — 99213 OFFICE O/P EST LOW 20 MIN: CPT | Mod: 25

## 2019-07-10 PROCEDURE — ? BIOPSY BY PUNCH METHOD

## 2019-07-10 ASSESSMENT — LOCATION ZONE DERM
LOCATION ZONE: ARM
LOCATION ZONE: TRUNK
LOCATION ZONE: NECK

## 2019-07-10 ASSESSMENT — LOCATION SIMPLE DESCRIPTION DERM
LOCATION SIMPLE: RIGHT SHOULDER
LOCATION SIMPLE: POSTERIOR NECK
LOCATION SIMPLE: RIGHT UPPER BACK
LOCATION SIMPLE: CHEST

## 2019-07-10 ASSESSMENT — LOCATION DETAILED DESCRIPTION DERM
LOCATION DETAILED: RIGHT MEDIAL TRAPEZIAL NECK
LOCATION DETAILED: RIGHT MEDIAL SUPERIOR CHEST
LOCATION DETAILED: RIGHT SUPERIOR UPPER BACK
LOCATION DETAILED: RIGHT INFERIOR POSTERIOR NECK
LOCATION DETAILED: RIGHT POSTERIOR SHOULDER

## 2019-07-10 NOTE — PROCEDURE: BIOPSY BY PUNCH METHOD
Lab: 253
Patient Will Remove Sutures At Home?: No
Hemostasis: None
Biopsy Type: H and E
Dressing: bandage
Billing Type: Third-Party Bill
Was A Bandage Applied: Yes
Anesthesia Type: 0.5% lidocaine with 1:200,000 epinephrine and a 1:10 solution of 8.4% sodium bicarbonate
Lab Facility: 
Post-Care Instructions: I reviewed with the patient in detail post-care instructions. Patient is to keep the biopsy site dry overnight, and then change the bandage and apply petrolatum once daily until sutures are removed.  Use a clean q-tip to apply the petrolatum and avoid touching the biopsy site with your hands.  Avoid immersing in water such as bathtub or swimming pools. Any concerns about a wound infection come into the office for a walk in visit Monday through Friday 8:30 am to 12 pm or 1 pm to 4:30 pm Signs of infection include increasing pain, redness, and drainage from biopsy site.  If we are not in the office, please call through the answering service.
Consent: Written consent was obtained and risks were reviewed including but not limited to scarring, infection, bleeding, scabbing, incomplete removal, nerve damage and allergy to anesthesia.
Suture Removal: 14 days
Punch Size In Mm: 3
Detail Level: Detailed
Size Of Lesion In Cm (Optional): 0.2
Additional Anesthesia Volume In Cc (Will Not Render If 0): 0
Home Suture Removal Text: Patient will remove their sutures at home.  If they have any questions or difficulties they will call the office.
Epidermal Sutures: 4-0 Nylon
Notification Instructions: Patient will be notified of biopsy results. However, patient instructed to call the office if not contacted within 2 weeks.
Anesthesia Volume In Cc: 0.6
Wound Care: Petrolatum

## 2019-07-23 ENCOUNTER — APPOINTMENT (RX ONLY)
Dept: URBAN - METROPOLITAN AREA CLINIC 35 | Facility: CLINIC | Age: 54
Setting detail: DERMATOLOGY
End: 2019-07-23

## 2019-07-23 DIAGNOSIS — Z48.02 ENCOUNTER FOR REMOVAL OF SUTURES: ICD-10-CM

## 2019-07-23 PROCEDURE — ? SUTURE REMOVAL (GLOBAL PERIOD)

## 2019-07-23 ASSESSMENT — LOCATION ZONE DERM: LOCATION ZONE: TRUNK

## 2019-07-23 ASSESSMENT — LOCATION SIMPLE DESCRIPTION DERM: LOCATION SIMPLE: CHEST

## 2019-07-23 ASSESSMENT — LOCATION DETAILED DESCRIPTION DERM: LOCATION DETAILED: RIGHT MEDIAL SUPERIOR CHEST

## 2019-07-23 NOTE — PROCEDURE: SUTURE REMOVAL (GLOBAL PERIOD)
Add 47704 Cpt? (Important Note: In 2017 The Use Of 18086 Is Being Tracked By Cms To Determine Future Global Period Reimbursement For Global Periods): no
Detail Level: Detailed

## 2019-07-26 ENCOUNTER — TELEPHONE (OUTPATIENT)
Dept: MEDICAL GROUP | Facility: LAB | Age: 54
End: 2019-07-26

## 2019-07-26 NOTE — TELEPHONE ENCOUNTER
ESTABLISHED PATIENT PRE-VISIT PLANNING     Patient was contacted to complete PVP.     Note: Patient will not be contacted if there is no indication to call.     1.  Reviewed notes from the last few office visits within the medical group: Yes    2.  If any orders were placed at last visit or intended to be done for this visit (i.e. 6 mos follow-up), do we have Results/Consult Notes?        •  Labs - Labs ordered, completed on 5/11/18 and results are in chart.   Note: If patient appointment is for lab review and patient did not complete labs, check with provider if OK to reschedule patient until labs completed.       •  Imaging - Imaging was not ordered at last office visit.       •  Referrals - No referrals were ordered at last office visit.    3. Is this appointment scheduled as a Hospital Follow-Up? No    4.  Immunizations were updated in Epic using WebIZ?: No WebIZ record       •  Web Iz Recommendations:     5.  Patient is due for the following Health Maintenance Topics:   Health Maintenance Due   Topic Date Due   • IMM DTaP/Tdap/Td Vaccine (1 - Tdap) 07/19/1985   • COLONOSCOPY  07/19/2016   • IMM ZOSTER VACCINES (1 of 2) 07/19/2016           6. Orders for overdue Health Maintenance topics pended in Pre-Charting? N\A      7.  Patient was informed to arrive 15 min prior to their scheduled appointment and bring in their medication bottles.

## 2019-08-02 ENCOUNTER — OFFICE VISIT (OUTPATIENT)
Dept: MEDICAL GROUP | Facility: LAB | Age: 54
End: 2019-08-02
Payer: COMMERCIAL

## 2019-08-02 VITALS
TEMPERATURE: 97.4 F | BODY MASS INDEX: 19.4 KG/M2 | HEART RATE: 72 BPM | WEIGHT: 128 LBS | HEIGHT: 68 IN | SYSTOLIC BLOOD PRESSURE: 108 MMHG | OXYGEN SATURATION: 100 % | DIASTOLIC BLOOD PRESSURE: 70 MMHG

## 2019-08-02 DIAGNOSIS — Z00.00 WELL ADULT EXAM: ICD-10-CM

## 2019-08-02 DIAGNOSIS — Z23 NEED FOR VACCINATION: ICD-10-CM

## 2019-08-02 DIAGNOSIS — A60.00 GENITAL HERPES SIMPLEX, UNSPECIFIED SITE: ICD-10-CM

## 2019-08-02 PROCEDURE — 99386 PREV VISIT NEW AGE 40-64: CPT | Mod: 25 | Performed by: NURSE PRACTITIONER

## 2019-08-02 PROCEDURE — 90471 IMMUNIZATION ADMIN: CPT | Performed by: NURSE PRACTITIONER

## 2019-08-02 PROCEDURE — 90715 TDAP VACCINE 7 YRS/> IM: CPT | Performed by: NURSE PRACTITIONER

## 2019-08-02 RX ORDER — VALACYCLOVIR HYDROCHLORIDE 1 G/1
1000 TABLET, FILM COATED ORAL
Qty: 20 TAB | Refills: 3 | Status: SHIPPED | OUTPATIENT
Start: 2019-08-02 | End: 2019-11-21 | Stop reason: SDUPTHER

## 2019-08-02 NOTE — PROGRESS NOTES
Subjective:     CC:   Chief Complaint   Patient presents with   • Establish Care     yearly labs, breast implant testing for lymphoma       HPI:   Sydney Mckeon is a 54 y.o. female who presents for annual exam. She is feeling well and denies any complaints.  She had been previous patient of Dr. Montoya.  Patient does present today with a list of labs she was given by her alternative medicine provider which she would like to have drawn today.  We have discussed that she may be charged for some of these labs and patient verbalizes understanding.    Ob-Gyn/ History:    Patient has GYN provider: yes Dr. Dubois  Last Pap Smear: 2019.  No history of abnormal pap smears.  Gyn Surgery: Endometrial ablation.  Current Contraceptive Method: None currently sexually active.  Last menstrual period: 5/2019.  Periods irregular. light bleeding. Cramping is none.   She does not take OTC analgesics for cramps.  No significant bloating/fluid retention, pelvic pain, or dyspareunia. No vaginal discharge  Urinary incontinence: Denies      Health Maintenance  Cholesterol Screening: Lipid panel ordered  Diabetes Screening: Fasting blood sugar ordered  Aspirin Use: Declines  Diet: Healthy diet including plenty of fruits, vegetables, whole grains, lean meats per  Exercise: Daily exercise  Substance Abuse: Denies  Safe in relationship.  Yes  Seat belts, bike helmet, gun safety discussed.  Sun protection used.    Cancer screening  Colorectal Cancer Screening: Records requested from gynecologist.  Fecal occult blood test done this year.  Lung Cancer Screening: Not indicated  Cervical Cancer Screening: Done this year.  Records requested from Dr. Domingo Rodriguez  Breast Cancer Screening: Due at age 40    Infectious disease screening/Immunizations  --STI Screening not indicated  --Practices safe sex.  --HIV Screening: Not indicated  --Hepatitis C Screening: Not indicated  --Immunizations:    Influenza: Does not get flu shots  annually.   Tetanus: Given today   Shingles: Not available      She  has a past medical history of Herpes genitalia.  She  has a past surgical history that includes appendectomy; hysteroscopy novasure-2 (N/A, 8/27/2018); and dilation and curettage (N/A, 8/27/2018).    Family History   Problem Relation Age of Onset   • Cancer Mother 50        breast   • Dementia Mother    • Diabetes Father    • Other Father         glioblastoma   • Thyroid Father    • Cancer Father         skin cancer   • Diabetes Maternal Grandmother    • No Known Problems Daughter        Social History     Socioeconomic History   • Marital status:      Spouse name: Not on file   • Number of children: Not on file   • Years of education: Not on file   • Highest education level: Not on file   Occupational History   • Not on file   Social Needs   • Financial resource strain: Not on file   • Food insecurity:     Worry: Not on file     Inability: Not on file   • Transportation needs:     Medical: Not on file     Non-medical: Not on file   Tobacco Use   • Smoking status: Never Smoker   • Smokeless tobacco: Never Used   Substance and Sexual Activity   • Alcohol use: No   • Drug use: No   • Sexual activity: Yes     Partners: Male   Lifestyle   • Physical activity:     Days per week: Not on file     Minutes per session: Not on file   • Stress: Not on file   Relationships   • Social connections:     Talks on phone: Not on file     Gets together: Not on file     Attends Denominational service: Not on file     Active member of club or organization: Not on file     Attends meetings of clubs or organizations: Not on file     Relationship status: Not on file   • Intimate partner violence:     Fear of current or ex partner: Not on file     Emotionally abused: Not on file     Physically abused: Not on file     Forced sexual activity: Not on file   Other Topics Concern   • Not on file   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 20 Tab 3   • vitamin D (CHOLECALCIFEROL) 1000 UNIT Tab Take 1,000 Units by mouth every day.     • MELATONIN PO Take  by mouth.     • Multiple Vitamins-Minerals (MULTIVITAMIN ADULT PO) Take  by mouth.       No current facility-administered medications for this visit.      Allergies   Allergen Reactions   • Sulfa Drugs Rash     Rash and shortness of breath   • Vicodin [Hydrocodone-Acetaminophen] Itching     \"bugs crawling on me\"       Review of Systems  Constitutional: Negative for fever, chills and malaise.  Positive for fatigue/weight gain  HENT: Negative for congestion.    Eyes: Negative for pain.   Respiratory: Negative for cough and shortness of breath.    Cardiovascular: Negative for leg swelling.   Gastrointestinal: Negative for nausea, vomiting, abdominal pain and diarrhea.   Genitourinary: Negative for dysuria and hematuria.   Skin: Negative for rash.   Neurological: Negative for dizziness, focal weakness and headaches.   Endo/Heme/Allergies: Does not bruise/bleed easily.   Psychiatric/Behavioral: Negative for depression.  The patient is not nervous/anxious.      Objective:     /70 (BP Location: Right arm, Patient Position: Sitting, BP Cuff Size: Adult)   Pulse 72   Temp 36.3 °C (97.4 °F) (Temporal)   Ht 1.727 m (5' 8\")   Wt 58.1 kg (128 lb)   SpO2 100%   BMI 19.46 kg/m²   Body mass index is 19.46 kg/m².  Wt Readings from Last 4 Encounters:   08/02/19 58.1 kg (128 lb)   08/27/18 56.7 kg (125 lb)   07/12/18 56.2 kg (123 lb 14.4 oz)   04/16/18 56.1 kg (123 lb 10.9 oz)       Physical Exam:  Constitutional: Well-developed and well-nourished. Not " diaphoretic. No distress.   Skin: Skin is warm and dry. No rash noted.  Head: Atraumatic without lesions.  Eyes: Conjunctivae and extraocular motions are normal. Pupils are equal, round, and reactive to light. No scleral icterus.   Ears:  External ears unremarkable. Tympanic membranes clear and intact.  Nose: Nares patent. Septum midline. Turbinates without erythema nor edema. No discharge.   Mouth/Throat:Tongue normal. Oropharynx is clear and moist. Posterior pharynx without erythema or exudates.  Neck: Supple, trachea midline. Normal range of motion. No thyromegaly present. No lymphadenopathy--cervical or supraclavicular.  Cardiovascular: Regular rate and rhythm, S1 and S2 without murmur, rubs, or gallops.  Lungs: Normal inspiratory effort, CTA bilaterally, no wheezes/rhonchi/rales  Abdomen: Soft, non tender, and without distention. Active bowel sounds in all four quadrants. No rebound, guarding, masses or HSM.  Extremities: No cyanosis, clubbing, erythema, nor edema. Distal pulses intact and symmetric.   Musculoskeletal: All major joints AROM full in all directions without pain.  Neurological: Alert and oriented x 3. Sensation intact. Negative Rhomberg.  Psychiatric:  Behavior, mood, and affect are appropriate.      Assessment and Plan:     1. Well adult exam  CBC WITH DIFFERENTIAL    Comp Metabolic Panel    Lipid Profile    TSH    FREE THYROXINE    VITAMIN D,25 HYDROXY    T3 FREE    THYROID PEROXIDASE  (TPO) AB    HEMOGLOBIN A1C    CRP QUANTITIVE (NON-CARDIAC)    URINE MICROSCOPIC (MICROSCOPIC URINALYSIS)    FERRITIN    IRON/TOTAL IRON BIND   2. Genital herpes simplex, unspecified site  valacyclovir (VALTREX) 1 GM Tab   3. Need for vaccination  Tdap Vaccine =>6YO IM       HCM: Reviewed with patient  FOBT results requested from GYN provider  Pap results requested  Labs per orders  Immunizations per orders  Patient counseled about skin care, diet, supplements, prenatal vitamins, safe sex and  exercise.    Follow-up: Return in about 1 year (around 8/2/2020) for Preventative/Annual physical.

## 2019-08-02 NOTE — LETTER
[facility]  TEZ Singh  [address]  [place] [address]  Fax: [phone]   Authorization for Release/Disclosure of   Protected Health Information   Name: ANABELLA MORRIS : 1965 SSN: [id number]   Address: [address] [place] [facility] [place]  [place] [address] Phone:    [phone] (home) [phone] (work)   I authorize the entity listed below to release/disclose the PHI below to:   [facility]/TEZ Singh and TEZ Singh   Provider or Entity Name:     Address   City, State, Zip   Phone:      Fax:     Reason for request: continuity of care   Information to be released:    [  ] LAST COLONOSCOPY,  including any PATH REPORT and follow-up  [  ] LAST FIT/COLOGUARD RESULT [  ] LAST DEXA  [  ] LAST MAMMOGRAM  [  ] LAST PAP  [  ] LAST LABS [  ] RETINA EXAM REPORT  [  ] IMMUNIZATION RECORDS  [  ] Release all info      [  ] Check here and initial the line next to each item to release ALL health information INCLUDING  _____ Care and treatment for drug and / or alcohol abuse  _____ HIV testing, infection status, or AIDS  _____ Genetic Testing    DATES OF SERVICE OR TIME PERIOD TO BE DISCLOSED: _____________  I understand and acknowledge that:  * This Authorization may be revoked at any time by you in writing, except if your health information has already been used or disclosed.  * Your health information that will be used or disclosed as a result of you signing this authorization could be re-disclosed by the recipient. If this occurs, your re-disclosed health information may no longer be protected by State or Federal laws.  * You may refuse to sign this Authorization. Your refusal will not affect your ability to obtain treatment.  * This Authorization becomes effective upon signing and will  on (date) __________.      If no date is indicated, this Authorization will  one (1) year from the signature date.    Name: Anabella Vang  Linda    Signature:   Date:     8/2/2019       PLEASE FAX REQUESTED RECORDS BACK TO: [phone]

## 2019-08-02 NOTE — LETTER
[facility]  TEZ Singh  [address]   [place] [address]  Fax: [phone]   Authorization for Release/Disclosure of   Protected Health Information   Name: ANABELLA MORRIS : 1965 SSN: [id number]   Address: [address] [facility] [address] [facility]  [place] [address] Phone:    [phone] (home) [phone] (work)   I authorize the entity listed below to release/disclose the PHI below to:   [facility]/TEZ Singh and TEZ Singh   Provider or Entity Name:     Address   City, State, Zip   Phone:      Fax:     Reason for request: continuity of care   Information to be released:    [  ] LAST COLONOSCOPY,  including any PATH REPORT and follow-up  [  ] LAST FIT/COLOGUARD RESULT [  ] LAST DEXA  [  ] LAST MAMMOGRAM  [  ] LAST PAP  [  ] LAST LABS [  ] RETINA EXAM REPORT  [  ] IMMUNIZATION RECORDS  [  ] Release all info      [  ] Check here and initial the line next to each item to release ALL health information INCLUDING  _____ Care and treatment for drug and / or alcohol abuse  _____ HIV testing, infection status, or AIDS  _____ Genetic Testing    DATES OF SERVICE OR TIME PERIOD TO BE DISCLOSED: _____________  I understand and acknowledge that:  * This Authorization may be revoked at any time by you in writing, except if your health information has already been used or disclosed.  * Your health information that will be used or disclosed as a result of you signing this authorization could be re-disclosed by the recipient. If this occurs, your re-disclosed health information may no longer be protected by State or Federal laws.  * You may refuse to sign this Authorization. Your refusal will not affect your ability to obtain treatment.  * This Authorization becomes effective upon signing and will  on (date) __________.      If no date is indicated, this Authorization will  one (1) year from the signature date.    Name: Anabella Vang  Linda    Signature:   Date:     8/2/2019       PLEASE FAX REQUESTED RECORDS BACK TO: [phone]

## 2019-09-13 ENCOUNTER — HOSPITAL ENCOUNTER (OUTPATIENT)
Dept: LAB | Facility: MEDICAL CENTER | Age: 54
End: 2019-09-13
Attending: NURSE PRACTITIONER
Payer: COMMERCIAL

## 2019-09-13 DIAGNOSIS — Z00.00 WELL ADULT EXAM: ICD-10-CM

## 2019-09-13 LAB
25(OH)D3 SERPL-MCNC: 28 NG/ML (ref 30–100)
FERRITIN SERPL-MCNC: 43.2 NG/ML (ref 10–291)
T3FREE SERPL-MCNC: 2.67 PG/ML (ref 2.4–4.2)
T4 FREE SERPL-MCNC: 0.79 NG/DL (ref 0.53–1.43)
THYROPEROXIDASE AB SERPL-ACNC: 0.4 IU/ML (ref 0–9)
TSH SERPL DL<=0.005 MIU/L-ACNC: 1.88 UIU/ML (ref 0.38–5.33)

## 2019-09-13 PROCEDURE — 86376 MICROSOMAL ANTIBODY EACH: CPT

## 2019-09-13 PROCEDURE — 84443 ASSAY THYROID STIM HORMONE: CPT

## 2019-09-13 PROCEDURE — 36415 COLL VENOUS BLD VENIPUNCTURE: CPT

## 2019-09-13 PROCEDURE — 82728 ASSAY OF FERRITIN: CPT

## 2019-09-13 PROCEDURE — 84439 ASSAY OF FREE THYROXINE: CPT

## 2019-09-13 PROCEDURE — 82306 VITAMIN D 25 HYDROXY: CPT

## 2019-09-13 PROCEDURE — 84481 FREE ASSAY (FT-3): CPT

## 2019-09-24 ENCOUNTER — HOSPITAL ENCOUNTER (OUTPATIENT)
Dept: LAB | Facility: MEDICAL CENTER | Age: 54
End: 2019-09-24
Attending: NURSE PRACTITIONER
Payer: COMMERCIAL

## 2019-09-24 LAB
APPEARANCE UR: CLEAR
BACTERIA #/AREA URNS HPF: ABNORMAL /HPF
BILIRUB UR QL STRIP.AUTO: NEGATIVE
COLOR UR: YELLOW
EPI CELLS #/AREA URNS HPF: NEGATIVE /HPF
GLUCOSE UR STRIP.AUTO-MCNC: NEGATIVE MG/DL
HYALINE CASTS #/AREA URNS LPF: ABNORMAL /LPF
KETONES UR STRIP.AUTO-MCNC: NEGATIVE MG/DL
LEUKOCYTE ESTERASE UR QL STRIP.AUTO: ABNORMAL
MICRO URNS: ABNORMAL
NITRITE UR QL STRIP.AUTO: NEGATIVE
PH UR STRIP.AUTO: 7 [PH] (ref 5–8)
PROT UR QL STRIP: NEGATIVE MG/DL
RBC # URNS HPF: ABNORMAL /HPF
RBC UR QL AUTO: NEGATIVE
SP GR UR STRIP.AUTO: 1
UROBILINOGEN UR STRIP.AUTO-MCNC: 0.2 MG/DL
WBC #/AREA URNS HPF: ABNORMAL /HPF

## 2019-09-24 PROCEDURE — 81001 URINALYSIS AUTO W/SCOPE: CPT

## 2019-11-08 ENCOUNTER — HOSPITAL ENCOUNTER (OUTPATIENT)
Dept: LAB | Facility: MEDICAL CENTER | Age: 54
End: 2019-11-08
Attending: NURSE PRACTITIONER
Payer: COMMERCIAL

## 2019-11-08 LAB
ALBUMIN SERPL BCP-MCNC: 5.1 G/DL (ref 3.2–4.9)
ALBUMIN/GLOB SERPL: 1.9 G/DL
ALP SERPL-CCNC: 47 U/L (ref 30–99)
ALT SERPL-CCNC: 13 U/L (ref 2–50)
ANION GAP SERPL CALC-SCNC: 9 MMOL/L (ref 0–11.9)
AST SERPL-CCNC: 18 U/L (ref 12–45)
BASOPHILS # BLD AUTO: 0.7 % (ref 0–1.8)
BASOPHILS # BLD: 0.04 K/UL (ref 0–0.12)
BILIRUB SERPL-MCNC: 1.5 MG/DL (ref 0.1–1.5)
BUN SERPL-MCNC: 9 MG/DL (ref 8–22)
CALCIUM SERPL-MCNC: 9.9 MG/DL (ref 8.5–10.5)
CHLORIDE SERPL-SCNC: 100 MMOL/L (ref 96–112)
CHOLEST SERPL-MCNC: 260 MG/DL (ref 100–199)
CO2 SERPL-SCNC: 28 MMOL/L (ref 20–33)
CREAT SERPL-MCNC: 0.66 MG/DL (ref 0.5–1.4)
CRP SERPL HS-MCNC: 0.11 MG/DL (ref 0–0.75)
EOSINOPHIL # BLD AUTO: 0.07 K/UL (ref 0–0.51)
EOSINOPHIL NFR BLD: 1.2 % (ref 0–6.9)
ERYTHROCYTE [DISTWIDTH] IN BLOOD BY AUTOMATED COUNT: 44.4 FL (ref 35.9–50)
EST. AVERAGE GLUCOSE BLD GHB EST-MCNC: 117 MG/DL
FASTING STATUS PATIENT QL REPORTED: NORMAL
GLOBULIN SER CALC-MCNC: 2.7 G/DL (ref 1.9–3.5)
GLUCOSE SERPL-MCNC: 95 MG/DL (ref 65–99)
HBA1C MFR BLD: 5.7 % (ref 0–5.6)
HCT VFR BLD AUTO: 43.2 % (ref 37–47)
HDLC SERPL-MCNC: 96 MG/DL
HGB BLD-MCNC: 13.9 G/DL (ref 12–16)
IMM GRANULOCYTES # BLD AUTO: 0.01 K/UL (ref 0–0.11)
IMM GRANULOCYTES NFR BLD AUTO: 0.2 % (ref 0–0.9)
IRON SATN MFR SERPL: 42 % (ref 15–55)
IRON SERPL-MCNC: 151 UG/DL (ref 40–170)
LDLC SERPL CALC-MCNC: 139 MG/DL
LYMPHOCYTES # BLD AUTO: 1.96 K/UL (ref 1–4.8)
LYMPHOCYTES NFR BLD: 33 % (ref 22–41)
MCH RBC QN AUTO: 31.4 PG (ref 27–33)
MCHC RBC AUTO-ENTMCNC: 32.2 G/DL (ref 33.6–35)
MCV RBC AUTO: 97.5 FL (ref 81.4–97.8)
MONOCYTES # BLD AUTO: 0.29 K/UL (ref 0–0.85)
MONOCYTES NFR BLD AUTO: 4.9 % (ref 0–13.4)
NEUTROPHILS # BLD AUTO: 3.57 K/UL (ref 2–7.15)
NEUTROPHILS NFR BLD: 60 % (ref 44–72)
NRBC # BLD AUTO: 0 K/UL
NRBC BLD-RTO: 0 /100 WBC
PLATELET # BLD AUTO: 206 K/UL (ref 164–446)
PMV BLD AUTO: 12.1 FL (ref 9–12.9)
POTASSIUM SERPL-SCNC: 4.2 MMOL/L (ref 3.6–5.5)
PROT SERPL-MCNC: 7.8 G/DL (ref 6–8.2)
RBC # BLD AUTO: 4.43 M/UL (ref 4.2–5.4)
SODIUM SERPL-SCNC: 137 MMOL/L (ref 135–145)
TIBC SERPL-MCNC: 361 UG/DL (ref 250–450)
TRIGL SERPL-MCNC: 126 MG/DL (ref 0–149)
WBC # BLD AUTO: 5.9 K/UL (ref 4.8–10.8)

## 2019-11-08 PROCEDURE — 36415 COLL VENOUS BLD VENIPUNCTURE: CPT

## 2019-11-08 PROCEDURE — 83036 HEMOGLOBIN GLYCOSYLATED A1C: CPT

## 2019-11-08 PROCEDURE — 80053 COMPREHEN METABOLIC PANEL: CPT

## 2019-11-08 PROCEDURE — 86140 C-REACTIVE PROTEIN: CPT

## 2019-11-08 PROCEDURE — 80061 LIPID PANEL: CPT

## 2019-11-08 PROCEDURE — 85025 COMPLETE CBC W/AUTO DIFF WBC: CPT

## 2019-11-08 PROCEDURE — 83550 IRON BINDING TEST: CPT

## 2019-11-08 PROCEDURE — 83540 ASSAY OF IRON: CPT

## 2019-11-14 ENCOUNTER — OFFICE VISIT (OUTPATIENT)
Dept: MEDICAL GROUP | Facility: LAB | Age: 54
End: 2019-11-14
Payer: COMMERCIAL

## 2019-11-14 VITALS
HEIGHT: 68 IN | TEMPERATURE: 97 F | DIASTOLIC BLOOD PRESSURE: 60 MMHG | BODY MASS INDEX: 18.64 KG/M2 | SYSTOLIC BLOOD PRESSURE: 102 MMHG | WEIGHT: 123 LBS | HEART RATE: 80 BPM | RESPIRATION RATE: 14 BRPM | OXYGEN SATURATION: 100 %

## 2019-11-14 DIAGNOSIS — R73.01 IFG (IMPAIRED FASTING GLUCOSE): ICD-10-CM

## 2019-11-14 DIAGNOSIS — E55.9 VITAMIN D DEFICIENCY: ICD-10-CM

## 2019-11-14 DIAGNOSIS — Z12.11 SCREEN FOR COLON CANCER: ICD-10-CM

## 2019-11-14 DIAGNOSIS — E78.5 DYSLIPIDEMIA (HIGH LDL; LOW HDL): ICD-10-CM

## 2019-11-14 PROCEDURE — 99214 OFFICE O/P EST MOD 30 MIN: CPT | Performed by: NURSE PRACTITIONER

## 2019-11-14 ASSESSMENT — PATIENT HEALTH QUESTIONNAIRE - PHQ9
SUM OF ALL RESPONSES TO PHQ QUESTIONS 1-9: 12
CLINICAL INTERPRETATION OF PHQ2 SCORE: 3
5. POOR APPETITE OR OVEREATING: 0 - NOT AT ALL

## 2019-11-14 NOTE — ASSESSMENT & PLAN NOTE
Chronic and stable.  Low ASCVD risk score.  She is concerned about her increase in lipids.  She does have high HDL.  She is worried because her father had a heart attack at her age.  We discussed CT cardiac scoring today and patient would like to go ahead get that done.    Patient and I then discussed necessary dietary changes to make to address dyslipidemia.  Patient verbalized understanding.    ROS is NEGATIVE for dizziness, generalized weakness/fatigue, vision/hearing changes, jaw pain/paresthesias, BUE pain/paresthesias/numbness/weakness, chest pain/pressure, palpitations, dyspnea, RUQ abdominal pain, oliguria/anuria, BLE edema.    Lab Results   Component Value Date/Time    CHOLSTRLTOT 260 (H) 11/08/2019 07:42 AM     (H) 11/08/2019 07:42 AM    HDL 96 11/08/2019 07:42 AM    TRIGLYCERIDE 126 11/08/2019 07:42 AM

## 2019-11-14 NOTE — ASSESSMENT & PLAN NOTE
New problem.  We discussed that patient is diagnosed with prediabetes,  given that the A1c is:   Lab Results   Component Value Date/Time    HBA1C 5.7 (H) 11/08/2019 07:42 AM        Patient is currently not taking medication.    We discussed that prediabetes/diabetes mellitus type 2 are medical conditions of lifestyle habits (less than optimal dietary choices, insufficient cardiovascular exercise), and that they can be controlled (in part or in whole) by these lifestyle changes.     Furthermore, we discussed that at present time it is better to pursue lifestyle changes rather than starting medications. Patient is in agreement.     Please see assessment/plan as below for further details.    ROS is NEGATIVE for blurred vision, polydipsia, polyuria, diaphoresis, palpitations, fatigue, irritability, flank pain, BLE paresthesias.

## 2019-11-14 NOTE — PROGRESS NOTES
CC:Diagnoses of Vitamin D deficiency, IFG (impaired fasting glucose), Dyslipidemia (high LDL; low HDL), and Screen for colon cancer were pertinent to this visit.    HISTORY OF PRESENT ILLNESS: Sydney Mckeon is a 54 y.o. female established patient who presents today to discuss the following problems:    Vitamin D deficiency  Chronic stable.  Patient is currently taking 1000 units of vitamin D 3 daily.    Dyslipidemia (high LDL; low HDL)  Chronic and stable.  Low ASCVD risk score.  She is concerned about her increase in lipids.  She does have high HDL.  She is worried because her father had a heart attack at her age.  We discussed CT cardiac scoring today and patient would like to go ahead get that done.    Patient and I then discussed necessary dietary changes to make to address dyslipidemia.  Patient verbalized understanding.    ROS is NEGATIVE for dizziness, generalized weakness/fatigue, vision/hearing changes, jaw pain/paresthesias, BUE pain/paresthesias/numbness/weakness, chest pain/pressure, palpitations, dyspnea, RUQ abdominal pain, oliguria/anuria, BLE edema.    Lab Results   Component Value Date/Time    CHOLSTRLTOT 260 (H) 11/08/2019 07:42 AM     (H) 11/08/2019 07:42 AM    HDL 96 11/08/2019 07:42 AM    TRIGLYCERIDE 126 11/08/2019 07:42 AM         IFG (impaired fasting glucose)  New problem.  We discussed that patient is diagnosed with prediabetes,  given that the A1c is:   Lab Results   Component Value Date/Time    HBA1C 5.7 (H) 11/08/2019 07:42 AM        Patient is currently not taking medication.    We discussed that prediabetes/diabetes mellitus type 2 are medical conditions of lifestyle habits (less than optimal dietary choices, insufficient cardiovascular exercise), and that they can be controlled (in part or in whole) by these lifestyle changes.     Furthermore, we discussed that at present time it is better to pursue lifestyle changes rather than starting medications. Patient is in  agreement.     Please see assessment/plan as below for further details.    ROS is NEGATIVE for blurred vision, polydipsia, polyuria, diaphoresis, palpitations, fatigue, irritability, flank pain, BLE paresthesias.      Health Maintenance: Completed    Patient Active Problem List    Diagnosis Date Noted   • IFG (impaired fasting glucose) 11/14/2019   • Dyslipidemia (high LDL; low HDL) 11/14/2019   • Genital herpes simplex 04/16/2018   • Family history of melanoma 04/16/2018   • Vitamin D deficiency 04/16/2018   • Insomnia 04/16/2018   • Healthcare maintenance 04/16/2018        Allergies:Sulfa drugs and Vicodin [hydrocodone-acetaminophen]    Current Outpatient Medications   Medication Sig Dispense Refill   • valacyclovir (VALTREX) 1 GM Tab Take 1 Tab by mouth every day. 20 Tab 3   • MELATONIN PO Take  by mouth.     • Multiple Vitamins-Minerals (MULTIVITAMIN ADULT PO) Take  by mouth.     • vitamin D (CHOLECALCIFEROL) 1000 UNIT Tab Take 1,000 Units by mouth every day.       No current facility-administered medications for this visit.        Social History     Tobacco Use   • Smoking status: Never Smoker   • Smokeless tobacco: Never Used   Substance Use Topics   • Alcohol use: No   • Drug use: No     Social History     Patient does not qualify to have social determinant information on file (likely too young).   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       Family History   Problem Relation Age of Onset   • Cancer Mother 50        breast   • Dementia Mother    • Diabetes Father    • Other Father         glioblastoma   • Thyroid Father    • Cancer Father         skin cancer   • Diabetes Maternal Grandmother    • No Known Problems Daughter        ROS:     No fevers or weight loss  No headaches or sore throat  No chest pain or shortness of breath  No bowel changes  No lower extremity edema  No suicidal ideation or panic attack        EXAM:   /60   Pulse 80   Temp 36.1 °C (97 °F)   Resp 14   Ht  "1.727 m (5' 7.99\")   Wt 55.8 kg (123 lb)   SpO2 100%  Body mass index is 18.71 kg/m².    Gen:         Alert and oriented, No apparent distress.  Neck:        No Lymphadenopathy or Bruits.  Lungs:     Clear to auscultation bilaterally  CV:          Regular rate and rhythm. No murmurs, rubs or gallops.               Ext:          No clubbing, cyanosis, edema.        ASSESSMENT/PLAN:    1. Vitamin D deficiency  Chronic Uncontrolled.  Patient and I discussed diagnosis of Vitamin D deficiency and patient instructed to continue with OTC Vitamin D3 supplementation 2000 IU's daily. Patient verbalized understanding.  Recheck labs in 6 months.      2. IFG (impaired fasting glucose)  Repeat A1c in 6 months.   The patient's last hemoglobin A1c:   Lab Results   Component Value Date/Time    HBA1C 5.7 (H) 11/08/2019 07:42 AM    HBA1C 5.4 04/19/2017 08:19 AM    HBA1C 5.3 03/31/2015 07:23 AM    HBA1C 5.7 (H) 03/17/2015 10:09 AM     l. Pt understands uncontrolled DM increases morbidity and mortality including but not limited to blindness, kidney failure, heart attack, limb amputations, vascular disease, and heart attacks.   Discussed diet, exercise, disease management and weight loss goals     3. Dyslipidemia (high LDL; low HDL)  New problem uncontrolled. Low ASCVD risk.   High cholesterol - advised patient low fat/cholesterol diet with >25 g fiber daily.  No refined carbohydrates and >9 servings of fresh fruits and vegetables.   We discussed in depth the importance of primary prevention of coronary disease with aggressive treatment of high cholesterol. I also educated the patient about lifestyle modifications which may improve blood pressure.    - CT-CARDIAC SCORING; Future    4. Screen for colon cancer  - COLOGUARD (FIT DNA)      Return in about 6 months (around 5/14/2020) for Routine Follow up.       Please note that this dictation was created using voice recognition software. I have made every reasonable attempt to correct " obvious errors, but I expect that there are errors of grammar and possibly content that I did not discover before finalizing the note.

## 2019-11-21 DIAGNOSIS — A60.00 GENITAL HERPES SIMPLEX, UNSPECIFIED SITE: ICD-10-CM

## 2019-11-21 RX ORDER — VALACYCLOVIR HYDROCHLORIDE 1 G/1
TABLET, FILM COATED ORAL
Qty: 30 TAB | Refills: 2 | Status: SHIPPED | OUTPATIENT
Start: 2019-11-21 | End: 2020-03-16

## 2019-11-21 NOTE — TELEPHONE ENCOUNTER
Was the patient seen in the last year in this department? Yes  11/14/19  Does patient have an active prescription for medications requested? No     Received Request Via: Pharmacy

## 2019-11-24 ENCOUNTER — HOSPITAL ENCOUNTER (OUTPATIENT)
Dept: RADIOLOGY | Facility: MEDICAL CENTER | Age: 54
End: 2019-11-24
Attending: NURSE PRACTITIONER
Payer: COMMERCIAL

## 2019-11-24 DIAGNOSIS — E78.5 DYSLIPIDEMIA (HIGH LDL; LOW HDL): ICD-10-CM

## 2019-11-24 PROCEDURE — 4410556 CT-CARDIAC SCORING

## 2019-11-26 DIAGNOSIS — R73.01 IFG (IMPAIRED FASTING GLUCOSE): ICD-10-CM

## 2019-11-26 DIAGNOSIS — R53.82 CHRONIC FATIGUE: ICD-10-CM

## 2019-11-27 DIAGNOSIS — Z82.49 FAMILY HISTORY OF HEART DISEASE IN MALE FAMILY MEMBER BEFORE AGE 55: ICD-10-CM

## 2019-11-27 DIAGNOSIS — R93.1 ELEVATED CORONARY ARTERY CALCIUM SCORE: ICD-10-CM

## 2019-12-23 ENCOUNTER — TELEPHONE (OUTPATIENT)
Dept: CARDIOLOGY | Facility: MEDICAL CENTER | Age: 54
End: 2019-12-23

## 2019-12-30 ENCOUNTER — OFFICE VISIT (OUTPATIENT)
Dept: CARDIOLOGY | Facility: MEDICAL CENTER | Age: 54
End: 2019-12-30
Payer: COMMERCIAL

## 2019-12-30 VITALS
HEART RATE: 70 BPM | OXYGEN SATURATION: 100 % | HEIGHT: 68 IN | DIASTOLIC BLOOD PRESSURE: 64 MMHG | SYSTOLIC BLOOD PRESSURE: 102 MMHG | WEIGHT: 120 LBS | BODY MASS INDEX: 18.19 KG/M2

## 2019-12-30 DIAGNOSIS — R93.1 AGATSTON CORONARY ARTERY CALCIUM SCORE LESS THAN 100: ICD-10-CM

## 2019-12-30 DIAGNOSIS — M79.602 LEFT ARM PAIN: ICD-10-CM

## 2019-12-30 DIAGNOSIS — R55 VASOVAGAL SYNCOPE: ICD-10-CM

## 2019-12-30 DIAGNOSIS — E78.5 DYSLIPIDEMIA: ICD-10-CM

## 2019-12-30 LAB — EKG IMPRESSION: NORMAL

## 2019-12-30 PROCEDURE — 99203 OFFICE O/P NEW LOW 30 MIN: CPT | Performed by: INTERNAL MEDICINE

## 2019-12-30 PROCEDURE — 93000 ELECTROCARDIOGRAM COMPLETE: CPT | Performed by: INTERNAL MEDICINE

## 2019-12-30 RX ORDER — OXYCODONE HYDROCHLORIDE AND ACETAMINOPHEN 5; 325 MG/1; MG/1
TABLET ORAL
COMMUNITY
Start: 2019-12-09 | End: 2019-12-30

## 2019-12-30 NOTE — PROGRESS NOTES
CARDIOLOGY NEW PATIENT CONSULTATION    PCP: TEZ Singh    1. Agatston coronary artery calcium score less than 100  45 agatston units, limited to LAD.  No angina.  Lock 10-year risk 2.4%, ASCVD ten-year risk 1.0%.  Discussed healthy lifestyle practices as well as potential benefits/limitations of statin therapy  - Recommended early recognition of warning symptoms of cardiovascular disease    2. Dyslipidemia  With high HDL.  Discussed healthy dietary practices which he is already doing.  - Reasonable to use red yeast rice in lieu of statin    3. Left arm pain  2 types.  One is musculoskeletal while the second is related to peripheral nerve compression.  Not anginal in character    4. Vasovagal syncope  Longstanding history, particular during blood draws.      Follow up with Issac Napier M.D. as needed      Chief Complaint   Patient presents with   • Hyperlipidemia       History: Sydney Mckeon is a 54 y.o. female with a past medical history of hyperlipidemia presenting for consultation regarding abnormal calcium score.  Patient was found to have elevated cholesterol and recommended to have calcium scoring which demonstrated LAD calcification with total coronary artery calcium score less than 100.  She has been engaged in a new regimen of treadmill exercise in addition to Pilates which she has done for a long time.  She feels well during these activities without angina but does experience left arm discomfort during certain Pilates maneuvers which require use of the left arm.  She also reports nocturnal tingling on the dorsum of her left hand.  None of these symptoms occur during other activities.  She makes an effort to avoid red meat more than once per month and in general tries to eat a healthy diet.  She has had several fainting episodes in her lifetime, particularly when doing activities like giving blood.    ROS:  All other systems reviewed and negative except as per the HPI    PE:  BP  "102/64 (BP Location: Right arm, Patient Position: Sitting, BP Cuff Size: Adult)   Pulse 70   Ht 1.727 m (5' 8\")   Wt 54.4 kg (120 lb)   SpO2 100%   BMI 18.25 kg/m²   GEN: Well appearing  HEENT: Symmetric face. Anicteric sclerae. Moist mucus membranes  NECK: No JVD. No lymphadenopathy  CARDIAC: Normal PMI, regular, normal S1, S2.  VASCULATURE: Normal carotid amplitude without bruit.   RESP: Clear to auscultation bilaterally  ABD: Soft, non-tender, non-distended  EXT: No edema, no clubbing or cyanosis  SKIN: Warm and dry  NEURO: No gross deficits  PSYCH: Appropriate affect, participates in conversation    Past Medical History:   Diagnosis Date   • Herpes genitalia      Past Surgical History:   Procedure Laterality Date   • HYSTEROSCOPY NOVASURE-2 N/A 8/27/2018    Procedure: HYSTEROSCOPY NOVASURE;  Surgeon: Amira Dubois M.D.;  Location: SURGERY SAME DAY [facility];  Service: Gynecology   • DILATION AND CURETTAGE N/A 8/27/2018    Procedure: DILATION AND CURETTAGE;  Surgeon: Amira Dubois M.D.;  Location: SURGERY SAME DAY [facility];  Service: Gynecology   • APPENDECTOMY      emergent     Allergies   Allergen Reactions   • Sulfa Drugs Rash     Rash and shortness of breath   • Vicodin [Hydrocodone-Acetaminophen] Itching     \"bugs crawling on me\"     Outpatient Encounter Medications as of 12/30/2019   Medication Sig Dispense Refill   • valacyclovir (VALTREX) 1 GM Tab TAKE 1 TABLET BY MOUTH EVERY DAY 30 Tab 2   • MELATONIN PO Take  by mouth.     • Multiple Vitamins-Minerals (MULTIVITAMIN ADULT PO) Take  by mouth.     • vitamin D (CHOLECALCIFEROL) 1000 UNIT Tab Take 1,000 Units by mouth every day.     • [DISCONTINUED] oxyCODONE-acetaminophen (PERCOCET) 5-325 MG Tab        No facility-administered encounter medications on file as of 12/30/2019.      Social History     Socioeconomic History   • Marital status:      Spouse name: Not on file   • Number of children: Not on file   • Years of education: Not " on file   • Highest education level: Not on file   Occupational History   • Not on file   Social Needs   • Financial resource strain: Not on file   • Food insecurity:     Worry: Not on file     Inability: Not on file   • Transportation needs:     Medical: Not on file     Non-medical: Not on file   Tobacco Use   • Smoking status: Never Smoker   • Smokeless tobacco: Never Used   Substance and Sexual Activity   • Alcohol use: No   • Drug use: No   • Sexual activity: Yes     Partners: Male   Lifestyle   • Physical activity:     Days per week: Not on file     Minutes per session: Not on file   • Stress: Not on file   Relationships   • Social connections:     Talks on phone: Not on file     Gets together: Not on file     Attends Rastafari service: Not on file     Active member of club or organization: Not on file     Attends meetings of clubs or organizations: Not on file     Relationship status: Not on file   • Intimate partner violence:     Fear of current or ex partner: Not on file     Emotionally abused: Not on file     Physically abused: Not on file     Forced sexual activity: Not on file   Other Topics Concern   • Not on file   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         Family History   Problem Relation Age of Onset   • Cancer Mother 50        breast   • Dementia Mother    • Diabetes Father    • Other Father         glioblastoma   • Thyroid Father    • Cancer Father         skin cancer   • Heart Attack Father    • Diabetes Maternal Grandmother    • No Known Problems Daughter          Studies  Lab Results   Component Value Date/Time    CHOLSTRLTOT 260 (H) 11/08/2019 07:42 AM     (H) 11/08/2019 07:42 AM    HDL 96 11/08/2019 07:42 AM    TRIGLYCERIDE 126 11/08/2019 07:42 AM       Lab Results   Component Value Date/Time    SODIUM 137 11/08/2019 07:42 AM    POTASSIUM 4.2 11/08/2019 07:42 AM    CHLORIDE 100 11/08/2019 07:42 AM    CO2 28 11/08/2019 07:42 AM    GLUCOSE 95 11/08/2019 07:42 AM     BUN 9 11/08/2019 07:42 AM    CREATININE 0.66 11/08/2019 07:42 AM     Lab Results   Component Value Date/Time    ALKPHOSPHAT 47 11/08/2019 07:42 AM    ASTSGOT 18 11/08/2019 07:42 AM    ALTSGPT 13 11/08/2019 07:42 AM    TBILIRUBIN 1.5 11/08/2019 07:42 AM        For this encounter I directly reviewed ECG tracings and medical records I agree with the interpretations in the electronic health record

## 2020-03-16 ENCOUNTER — OFFICE VISIT (OUTPATIENT)
Dept: MEDICAL GROUP | Facility: MEDICAL CENTER | Age: 55
End: 2020-03-16
Payer: COMMERCIAL

## 2020-03-16 VITALS
TEMPERATURE: 97.7 F | RESPIRATION RATE: 16 BRPM | SYSTOLIC BLOOD PRESSURE: 108 MMHG | WEIGHT: 120.15 LBS | HEIGHT: 68 IN | HEART RATE: 70 BPM | DIASTOLIC BLOOD PRESSURE: 66 MMHG | BODY MASS INDEX: 18.21 KG/M2 | OXYGEN SATURATION: 99 %

## 2020-03-16 DIAGNOSIS — Z80.8 FAMILY HISTORY OF MELANOMA: ICD-10-CM

## 2020-03-16 DIAGNOSIS — L98.9 SKIN ABNORMALITY: ICD-10-CM

## 2020-03-16 DIAGNOSIS — Z00.00 HEALTHCARE MAINTENANCE: ICD-10-CM

## 2020-03-16 DIAGNOSIS — R73.01 IFG (IMPAIRED FASTING GLUCOSE): ICD-10-CM

## 2020-03-16 DIAGNOSIS — M25.512 LEFT SHOULDER PAIN, UNSPECIFIED CHRONICITY: ICD-10-CM

## 2020-03-16 DIAGNOSIS — F43.21 GRIEF: ICD-10-CM

## 2020-03-16 DIAGNOSIS — E78.5 DYSLIPIDEMIA (HIGH LDL; LOW HDL): ICD-10-CM

## 2020-03-16 PROCEDURE — 99214 OFFICE O/P EST MOD 30 MIN: CPT | Performed by: INTERNAL MEDICINE

## 2020-03-16 ASSESSMENT — PATIENT HEALTH QUESTIONNAIRE - PHQ9
CLINICAL INTERPRETATION OF PHQ2 SCORE: 5
5. POOR APPETITE OR OVEREATING: 0 - NOT AT ALL
SUM OF ALL RESPONSES TO PHQ QUESTIONS 1-9: 9

## 2020-03-16 ASSESSMENT — FIBROSIS 4 INDEX: FIB4 SCORE: 1.28

## 2020-03-16 NOTE — PROGRESS NOTES
CC:  Diagnoses of Grief, Skin abnormality, IFG (impaired fasting glucose), Dyslipidemia (high LDL; low HDL), Left shoulder pain, unspecified chronicity, Family history of melanoma, and Healthcare maintenance were pertinent to this visit.    HISTORY OF THE PRESENT ILLNESS: Patient is a 54 y.o. female. This pleasant patient is here today to [facility].    Has concern over thumb region if it may be a wart, but with her family history melanoma she prefers to have treatment/eval through dermatology.    Seen MACKENZIE for her chronic left shoulder discomfort, impaired range of motion, after 6 weeks of physical therapy she still has limitation.  She has pending follow-up with orthopedic for additional imaging.    Has gynecologist Dr. morris, patient has family history of breast cancer, she sees her gynecologist in the near future for her mammogram order.  No current breast concerns.    Labs reviewed prior vitamin D level 28.  Labs 11/8/2019 A1c 5.7, CMP normal LFTs, CBC reasonable, total cholesterol 260, triglycerides 126, HDL 96, .  Cardiac CT 11/24/2019 LAD score 44.8 and above the 75th percentile.  Since seen by cardiology this note is briefly reviewed from 12/30/2019, thought to have low ten-year risk of ASCVD, and it was recommended to have the lifestyle practices.  Patient says she does exercise regularly without any concerning cardiopulmonary symptoms.    She will turn her Cologuard soon.  Denies any GI symptoms.    Has grief with loss of her mother approximately 6 months ago.  Would like to see psychologist.  Does not wish to have any medication management.  PHQ 9 score 9.  Most trouble with insomnia, melatonin works well over-the-counter.    Allergies: Sulfa drugs and Vicodin [hydrocodone-acetaminophen]    Current Outpatient Medications Ordered in Epic   Medication Sig Dispense Refill   • MELATONIN PO Take  by mouth.     • Multiple Vitamins-Minerals (MULTIVITAMIN ADULT PO) Take  by mouth.     • vitamin D  (CHOLECALCIFEROL) 1000 UNIT Tab Take 1,000 Units by mouth every day.       No current [facility]-ordered facility-administered medications on file.        Past Medical History:   Diagnosis Date   • Herpes genitalia        Past Surgical History:   Procedure Laterality Date   • HYSTEROSCOPY NOVASURE-2 N/A 8/27/2018    Procedure: HYSTEROSCOPY NOVASURE;  Surgeon: Amira Dubois M.D.;  Location: SURGERY SAME DAY [facility];  Service: Gynecology   • DILATION AND CURETTAGE N/A 8/27/2018    Procedure: DILATION AND CURETTAGE;  Surgeon: Amira Dubois M.D.;  Location: SURGERY SAME DAY [facility];  Service: Gynecology   • APPENDECTOMY      emergent       Social History     Tobacco Use   • Smoking status: Never Smoker   • Smokeless tobacco: Never Used   Substance Use Topics   • Alcohol use: No   • Drug use: No       Social History     Social History Narrative   • Not on file       Family History   Problem Relation Age of Onset   • Cancer Mother 50        breast   • Dementia Mother    • Diabetes Father    • Other Father         glioblastoma   • Thyroid Father    • Cancer Father         skin cancer   • Heart Attack Father    • Diabetes Maternal Grandmother    • No Known Problems Daughter        ROS:     - Constitutional: Negative for fever, chills    - Eyes:   Negative for blurry vision, eye pain, discharge    - ENT:  Negative for hearing changes, ear pain, ear discharge, rhinorrhea, sinus congestion, sore throat     - Respiratory: Negative for cough, sputum production, chest congestion, dyspnea, wheezing, and crackles.      - Cardiovascular: Negative for chest pain, palpitations, orthopnea, and bilateral lower extremity edema.     - Gastrointestinal: Negative for heartburn, nausea, vomiting, abdominal pain, hematochezia, melena, diarrhea, constipation, and greasy/foul-smelling stools.     - Genitourinary: Negative for dysuria    - Musculoskeletal: see hpi    - Skin: see hpi    - Neurological: Negative for vertigo    -  "Endo:Negative for polyuria, heat/cold intolerance, excessive thirst    - Hem/lymphatic: Negative for  swollen glands    -Allergic/immun: Negative for allergic rhinitis    - Psychiatric/Behavioral: Negative for  suicidal/homicidal ideation and memory loss.      Exam: /66 (BP Location: Right arm, Patient Position: Sitting, BP Cuff Size: Adult)   Pulse 70   Temp 36.5 °C (97.7 °F) (Temporal)   Resp 16   Ht 1.727 m (5' 8\")   Wt 54.5 kg (120 lb 2.4 oz)   SpO2 99%  Body mass index is 18.27 kg/m².    General: Normal appearing. No distress.  EYES: Conjunctiva clear lids without ptosis, pupils equal  EARS: Normal shape and contour   Neurologic: Cranial nerves grossly nonfocal  Skin: Warm and dry.  No obvious lesions.  Musculoskeletal: Normal gait. No extremity cyanosis, clubbing, or edema.  Psych: Normal mood and affect. Alert and oriented x3. Judgment and insight is normal.  (with Coronavirus, pt was offered and declined exam and this is reasonable)    Assessment/Plan  1. Grief  Stable for outpatient psychology follow-up.  She will let me know if she does wish to have pharmaceutical management in the future.  - REFERRAL TO PSYCHOLOGY    2. Skin abnormality  With family history of melanoma, patient agreeable to dermatology.  Agree with patient thumb concern is most likely verruca.   - REFERRAL TO DERMATOLOGY    3. IFG (impaired fasting glucose)  Stable, chronic, continue healthy exercise and dietary habits.  - HEMOGLOBIN A1C; Future  - Comp Metabolic Panel; Future    4. Dyslipidemia (high LDL; low HDL)  Stable, chronic, continue healthy exercise and dietary habits.  - Lipid Profile; Future    5. Left shoulder pain, unspecified chronicity  Chronic, not relieved with physical therapy, pending orthopedic evaluation with imaging.  - REFERRAL TO ORTHOPEDICS    6. Family history of melanoma  See #2 above.  - REFERRAL TO DERMATOLOGY    7. Healthcare maintenance  - Lipid Profile; Future  - VITAMIN B12; Future  - VITAMIN " D,25 HYDROXY; Future  - Comp Metabolic Panel; Future    rtc 6mo or prn          Please note that this dictation was created using voice recognition software. I have made every reasonable attempt to correct obvious errors, but I expect that there are errors of grammar and possibly content that I did not discover before finalizing the note.

## 2020-04-28 ENCOUNTER — HOSPITAL ENCOUNTER (OUTPATIENT)
Dept: RADIOLOGY | Facility: MEDICAL CENTER | Age: 55
End: 2020-04-28
Attending: ORTHOPAEDIC SURGERY
Payer: COMMERCIAL

## 2020-04-28 DIAGNOSIS — M25.512 LEFT SHOULDER PAIN, UNSPECIFIED CHRONICITY: ICD-10-CM

## 2020-04-28 PROCEDURE — 73030 X-RAY EXAM OF SHOULDER: CPT | Mod: LT

## 2020-05-05 ENCOUNTER — APPOINTMENT (OUTPATIENT)
Dept: RADIOLOGY | Facility: MEDICAL CENTER | Age: 55
End: 2020-05-05
Attending: ORTHOPAEDIC SURGERY
Payer: COMMERCIAL

## 2020-05-05 DIAGNOSIS — M75.02 ADHESIVE BURSITIS OF LEFT SHOULDER: ICD-10-CM

## 2020-05-05 DIAGNOSIS — M75.122 COMPLETE TEAR OF LEFT ROTATOR CUFF, UNSPECIFIED WHETHER TRAUMATIC: ICD-10-CM

## 2020-05-05 PROCEDURE — 73221 MRI JOINT UPR EXTREM W/O DYE: CPT | Mod: LT

## 2020-05-18 DIAGNOSIS — Z00.00 PREVENTATIVE HEALTH CARE: ICD-10-CM

## 2020-05-20 ENCOUNTER — HOSPITAL ENCOUNTER (OUTPATIENT)
Dept: LAB | Facility: MEDICAL CENTER | Age: 55
End: 2020-05-20
Attending: INTERNAL MEDICINE
Payer: COMMERCIAL

## 2020-05-20 DIAGNOSIS — R73.01 IFG (IMPAIRED FASTING GLUCOSE): ICD-10-CM

## 2020-05-20 DIAGNOSIS — Z00.00 PREVENTATIVE HEALTH CARE: ICD-10-CM

## 2020-05-20 DIAGNOSIS — Z00.00 HEALTHCARE MAINTENANCE: ICD-10-CM

## 2020-05-20 DIAGNOSIS — E78.5 DYSLIPIDEMIA (HIGH LDL; LOW HDL): ICD-10-CM

## 2020-05-20 LAB
25(OH)D3 SERPL-MCNC: 56 NG/ML (ref 30–100)
ALBUMIN SERPL BCP-MCNC: 4.8 G/DL (ref 3.2–4.9)
ALBUMIN/GLOB SERPL: 1.6 G/DL
ALP SERPL-CCNC: 57 U/L (ref 30–99)
ALT SERPL-CCNC: 11 U/L (ref 2–50)
ANION GAP SERPL CALC-SCNC: 11 MMOL/L (ref 7–16)
AST SERPL-CCNC: 16 U/L (ref 12–45)
BILIRUB SERPL-MCNC: 1.7 MG/DL (ref 0.1–1.5)
BUN SERPL-MCNC: 8 MG/DL (ref 8–22)
CALCIUM SERPL-MCNC: 9.8 MG/DL (ref 8.5–10.5)
CHLORIDE SERPL-SCNC: 94 MMOL/L (ref 96–112)
CHOLEST SERPL-MCNC: 240 MG/DL (ref 100–199)
CO2 SERPL-SCNC: 26 MMOL/L (ref 20–33)
CREAT SERPL-MCNC: 0.53 MG/DL (ref 0.5–1.4)
EST. AVERAGE GLUCOSE BLD GHB EST-MCNC: 105 MG/DL
FASTING STATUS PATIENT QL REPORTED: NORMAL
GLOBULIN SER CALC-MCNC: 3 G/DL (ref 1.9–3.5)
GLUCOSE SERPL-MCNC: 88 MG/DL (ref 65–99)
HBA1C MFR BLD: 5.3 % (ref 0–5.6)
HDLC SERPL-MCNC: 96 MG/DL
LDLC SERPL CALC-MCNC: 129 MG/DL
POTASSIUM SERPL-SCNC: 4.2 MMOL/L (ref 3.6–5.5)
PROT SERPL-MCNC: 7.8 G/DL (ref 6–8.2)
SODIUM SERPL-SCNC: 131 MMOL/L (ref 135–145)
TRIGL SERPL-MCNC: 77 MG/DL (ref 0–149)
VIT B12 SERPL-MCNC: 2777 PG/ML (ref 211–911)

## 2020-05-20 PROCEDURE — 80061 LIPID PANEL: CPT

## 2020-05-20 PROCEDURE — 82607 VITAMIN B-12: CPT

## 2020-05-20 PROCEDURE — 83036 HEMOGLOBIN GLYCOSYLATED A1C: CPT

## 2020-05-20 PROCEDURE — 86769 SARS-COV-2 COVID-19 ANTIBODY: CPT

## 2020-05-20 PROCEDURE — 80053 COMPREHEN METABOLIC PANEL: CPT

## 2020-05-20 PROCEDURE — 82306 VITAMIN D 25 HYDROXY: CPT

## 2020-05-20 PROCEDURE — 36415 COLL VENOUS BLD VENIPUNCTURE: CPT

## 2020-05-21 DIAGNOSIS — E87.1 HYPONATREMIA: ICD-10-CM

## 2020-05-21 LAB — TEST NAME 95000: NORMAL

## 2020-07-15 ENCOUNTER — APPOINTMENT (RX ONLY)
Dept: URBAN - METROPOLITAN AREA CLINIC 35 | Facility: CLINIC | Age: 55
Setting detail: DERMATOLOGY
End: 2020-07-15

## 2020-07-15 DIAGNOSIS — Z71.89 OTHER SPECIFIED COUNSELING: ICD-10-CM

## 2020-07-15 DIAGNOSIS — L84 CORNS AND CALLOSITIES: ICD-10-CM

## 2020-07-15 DIAGNOSIS — L82.1 OTHER SEBORRHEIC KERATOSIS: ICD-10-CM

## 2020-07-15 DIAGNOSIS — D22 MELANOCYTIC NEVI: ICD-10-CM

## 2020-07-15 DIAGNOSIS — L82.0 INFLAMED SEBORRHEIC KERATOSIS: ICD-10-CM

## 2020-07-15 DIAGNOSIS — L81.4 OTHER MELANIN HYPERPIGMENTATION: ICD-10-CM

## 2020-07-15 PROBLEM — D22.4 MELANOCYTIC NEVI OF SCALP AND NECK: Status: ACTIVE | Noted: 2020-07-15

## 2020-07-15 PROCEDURE — ? LIQUID NITROGEN

## 2020-07-15 PROCEDURE — 17110 DESTRUCTION B9 LES UP TO 14: CPT

## 2020-07-15 PROCEDURE — ? COUNSELING

## 2020-07-15 PROCEDURE — 99213 OFFICE O/P EST LOW 20 MIN: CPT | Mod: 25

## 2020-07-15 ASSESSMENT — LOCATION DETAILED DESCRIPTION DERM
LOCATION DETAILED: RIGHT POSTERIOR SHOULDER
LOCATION DETAILED: RIGHT INFERIOR POSTERIOR NECK
LOCATION DETAILED: RIGHT PROXIMAL DORSAL THUMB
LOCATION DETAILED: RIGHT MEDIAL TRAPEZIAL NECK
LOCATION DETAILED: LEFT SUPERIOR PARIETAL SCALP
LOCATION DETAILED: RIGHT SUPERIOR UPPER BACK
LOCATION DETAILED: RIGHT DISTAL POSTERIOR THIGH

## 2020-07-15 ASSESSMENT — LOCATION SIMPLE DESCRIPTION DERM
LOCATION SIMPLE: SCALP
LOCATION SIMPLE: POSTERIOR NECK
LOCATION SIMPLE: RIGHT THUMB
LOCATION SIMPLE: RIGHT POSTERIOR THIGH
LOCATION SIMPLE: RIGHT SHOULDER
LOCATION SIMPLE: RIGHT UPPER BACK

## 2020-07-15 ASSESSMENT — LOCATION ZONE DERM
LOCATION ZONE: ARM
LOCATION ZONE: SCALP
LOCATION ZONE: NECK
LOCATION ZONE: TRUNK
LOCATION ZONE: FINGER
LOCATION ZONE: LEG

## 2020-07-15 NOTE — PROCEDURE: LIQUID NITROGEN
Include Z78.9 (Other Specified Conditions Influencing Health Status) As An Associated Diagnosis?: No
Post-Care Instructions: I reviewed with the patient in detail post-care instructions. Patient is to wear sunprotection, and avoid picking at any of the treated lesions. Pt may apply Vaseline to crusted or scabbing areas.
Medical Necessity Clause: This procedure was medically necessary because the lesions that were treated were:
Number Of Freeze-Thaw Cycles: 2 freeze-thaw cycles
Medical Necessity Information: It is in your best interest to select a reason for this procedure from the list below. All of these items fulfill various CMS LCD requirements except the new and changing color options.
Duration Of Freeze Thaw-Cycle (Seconds): 10
Detail Level: Detailed
Consent: The patient's consent was obtained including but not limited to risks of crusting, scabbing, blistering, scarring, darker or lighter pigmentary change, recurrence, incomplete removal and infection.

## 2020-09-17 ENCOUNTER — OFFICE VISIT (OUTPATIENT)
Dept: MEDICAL GROUP | Facility: MEDICAL CENTER | Age: 55
End: 2020-09-17
Payer: COMMERCIAL

## 2020-09-17 VITALS
HEART RATE: 69 BPM | WEIGHT: 120.59 LBS | BODY MASS INDEX: 18.28 KG/M2 | OXYGEN SATURATION: 99 % | SYSTOLIC BLOOD PRESSURE: 102 MMHG | DIASTOLIC BLOOD PRESSURE: 70 MMHG | TEMPERATURE: 98.2 F | RESPIRATION RATE: 16 BRPM | HEIGHT: 68 IN

## 2020-09-17 DIAGNOSIS — R92.2 DENSE BREAST: ICD-10-CM

## 2020-09-17 DIAGNOSIS — Z80.8 FAMILY HISTORY OF MELANOMA: ICD-10-CM

## 2020-09-17 DIAGNOSIS — R53.83 FATIGUE, UNSPECIFIED TYPE: ICD-10-CM

## 2020-09-17 DIAGNOSIS — E55.9 VITAMIN D DEFICIENCY: ICD-10-CM

## 2020-09-17 DIAGNOSIS — E78.5 DYSLIPIDEMIA (HIGH LDL; LOW HDL): ICD-10-CM

## 2020-09-17 DIAGNOSIS — R73.01 IMPAIRED FASTING GLUCOSE: ICD-10-CM

## 2020-09-17 DIAGNOSIS — R92.30 DENSE BREAST: ICD-10-CM

## 2020-09-17 DIAGNOSIS — Z12.31 ENCOUNTER FOR SCREENING MAMMOGRAM FOR MALIGNANT NEOPLASM OF BREAST: ICD-10-CM

## 2020-09-17 PROCEDURE — 99214 OFFICE O/P EST MOD 30 MIN: CPT | Performed by: INTERNAL MEDICINE

## 2020-09-17 ASSESSMENT — FIBROSIS 4 INDEX: FIB4 SCORE: 1.26

## 2020-09-17 NOTE — PROGRESS NOTES
CC:  Diagnoses of Dense breast, Encounter for screening mammogram for malignant neoplasm of breast, Vitamin D deficiency, Family history of melanoma, IFG (impaired fasting glucose), and Dyslipidemia (high LDL; low HDL) were pertinent to this visit.    HISTORY OF THE PRESENT ILLNESS: Patient is a 55 y.o. female. This pleasant patient is here today for routine follow-up.    She says she is working with integrative specialist Hemanth Bills for her continued symptoms of chronic fatigue syndrome which have been going on for many years.  She tells me her integrative specialist feels that may have been caused by her remote exposure to black mold.  She tells me she is being treated with chelation therapy.    In contrast her gynecologist recommended that she may consider progesterone for her symptoms.  She has not started progesterone yet because she wants to speak to her integrative specialist first.    She has not done her annual mammogram because she is worried about radiation exposure and she wants to have her integrative specialist give her recommendation for best screening options.  I did discuss with her breast ultrasound, she is agreeable to do this, though she understands that ultrasound can potentially miss malignancies that would normally be found on mammogram.  She does not indicate she has any new breast concerns.  Recommended regular self breast exams as well.    Symptoms of hair thinning, skin dryness, chronic fatigue.  She denies any cardiopulmonary symptoms, change in bowel movements, rectal bleeding, fever, chills.    Labs are reviewed 5/20/2020 normal GFR, A1c has improved from 5.7 down to 5.3, B12 levels are more than sufficient she has cut back, vitamin D deficiency resolved level increase from 20 up to 56.  She is aware that her total cholesterol is 240, triglycerides 77, HDL 96, .  We again reviewed her prior cardiology evaluation after her CT cardiac scoring, she was recommended she could try  red yeast rice.  She wishes to discuss red yeast rice with her integrative specialist prior to starting.    She was happy to report her left shoulder is doing better, she had treatment for frozen shoulder with physical therapy and injection and symptoms have resolved.    With her family history of melanoma she did have a recent normal skin exam.    Still pending psychology appointment she will consider scheduling but had got postponed due to the pandemic.      Allergies: Sulfa drugs and Vicodin [hydrocodone-acetaminophen]    Current Outpatient Medications Ordered in Epic   Medication Sig Dispense Refill   • Progesterone Micronized (PROGESTERONE PO) Take 100 mg by mouth.     • MELATONIN PO Take  by mouth.     • Multiple Vitamins-Minerals (MULTIVITAMIN ADULT PO) Take  by mouth.     • vitamin D (CHOLECALCIFEROL) 1000 UNIT Tab Take 1,000 Units by mouth every day.       No current [facility]-ordered facility-administered medications on file.        Past Medical History:   Diagnosis Date   • Herpes genitalia        Past Surgical History:   Procedure Laterality Date   • HYSTEROSCOPY NOVASURE-2 N/A 8/27/2018    Procedure: HYSTEROSCOPY NOVASURE;  Surgeon: Amira Dubois M.D.;  Location: SURGERY SAME DAY [facility];  Service: Gynecology   • DILATION AND CURETTAGE N/A 8/27/2018    Procedure: DILATION AND CURETTAGE;  Surgeon: Amira Dubois M.D.;  Location: SURGERY SAME DAY [facility];  Service: Gynecology   • APPENDECTOMY      emergent       Social History     Tobacco Use   • Smoking status: Never Smoker   • Smokeless tobacco: Never Used   Substance Use Topics   • Alcohol use: No   • Drug use: No       Social History     Social History Narrative   • Not on file       Family History   Problem Relation Age of Onset   • Cancer Mother 50        breast   • Dementia Mother    • Diabetes Father    • Other Father         glioblastoma   • Thyroid Father    • Cancer Father         skin cancer   • Heart Attack Father    • Diabetes  "Maternal Grandmother    • No Known Problems Daughter        ROS:     - Constitutional: Negative for fever, chills     - Eyes:   Negative for  eye pain, discharge    - ENT:  Negative for sore throat     - Respiratory: Negative for cough, sputum production, chest congestion, dyspnea, wheezing, and crackles.      - Cardiovascular: Negative for chest pain, palpitations, orthopnea, and bilateral lower extremity edema.     - Gastrointestinal: Negative for abdominal pain, hematochezia, melena    - Genitourinary: Negative for dysuria    - Musculoskeletal: Shoulder is better per HPI.  Patient will charley horse in her foot at night    - Skin: Negative for rash, itching, cyanotic skin color change.     - Neurological: Negative for vertigo    - Endo: She feels thirsty frequently but says she is very well-hydrated    - Hem/lymphatic: Negative for swollen glands    -Allergic/immun: Negative for allergic rhinitis    - Psychiatric/Behavioral: Negative for suicidal/homicidal ideation and memory loss.      Exam: /70 (BP Location: Right arm, Patient Position: Sitting, BP Cuff Size: Adult)   Pulse 69   Temp 36.8 °C (98.2 °F) (Temporal)   Resp 16   Ht 1.727 m (5' 8\")   Wt 54.7 kg (120 lb 9.5 oz)   SpO2 99%  Body mass index is 18.34 kg/m².    General: Normal appearing. No distress.  EYES: Conjunctiva clear lids without ptosis, pupils equal  EARS: Normal shape and contour   Pulmonary: Clear to ausculation.  Normal effort. No rales or wheezing.  Cardiovascular: Regular rate and rhythm without significant murmur.   Abdomen: Soft, nontender, nondistended. Normal bowel sounds.  Neurologic: Cranial nerves grossly nonfocal  Skin: Warm and dry.  No obvious lesions.  Musculoskeletal: Normal gait. No extremity cyanosis, clubbing, or edema.  Psych: Normal mood and affect. Alert and oriented x3. Judgment and insight is normal.      Assessment/Plan  1. Dense breast  Advised patient to discuss breast cancer screening with her " gynecologist.  She tells me she wants to ask her integrative specialist about avoiding radiation.  Advised patient that breast ultrasound will potentially miss malignancies that would be found on mammogram and vice versa.  She expressed understanding.  She has no current breast concerns.  - US-SCREENING WHOLE BREAST (3D SCREENING); Future    2. Encounter for screening mammogram for malignant neoplasm of breast  #1 above  - US-SCREENING WHOLE BREAST (3D SCREENING); Future    3. Vitamin D deficiency  Her vitamin D deficiency has resolved, continue with over-the-counter supplementation.    4. Family history of melanoma  Follow-up dermatology per routine she states no current skin concerns.    5. IFG (impaired fasting glucose)  Her A1c is now back in normal range, continue with healthy diet and exercise, will continue to monitor.    6. Dyslipidemia (high LDL; low HDL)  Has been evaluated cardiology, had CT cardiac scoring, etc.  Advised could consider red yeast rice, she says she wants to discuss with her integrative specialist first.    7.  Chronic fatigue  Patient brought in a long list of labs that her integrative specialist Hemanth Bills has requested.  She has requested that we enter these labs at [facility] so that she can go ahead and have them done here.  These are not labs that I would personally recommend, the patient request that they are drawn and we will support her with her request.  The morning cortisol could potentially be helpful.  Will add thyroid function to the labs.      RTC 6 months or sooner if needed    Please note that this dictation was created using voice recognition software. I have made every reasonable attempt to correct obvious errors, but I expect that there are errors of grammar and possibly content that I did not discover before finalizing the note.

## 2020-09-25 ENCOUNTER — HOSPITAL ENCOUNTER (OUTPATIENT)
Dept: LAB | Facility: MEDICAL CENTER | Age: 55
End: 2020-09-25
Attending: INTERNAL MEDICINE
Payer: COMMERCIAL

## 2020-09-25 DIAGNOSIS — R53.83 FATIGUE, UNSPECIFIED TYPE: ICD-10-CM

## 2020-09-25 LAB
ALBUMIN SERPL BCP-MCNC: 5 G/DL (ref 3.2–4.9)
ALBUMIN/GLOB SERPL: 2 G/DL
ALP SERPL-CCNC: 52 U/L (ref 30–99)
ALT SERPL-CCNC: 18 U/L (ref 2–50)
ANION GAP SERPL CALC-SCNC: 11 MMOL/L (ref 7–16)
AST SERPL-CCNC: 21 U/L (ref 12–45)
BASOPHILS # BLD AUTO: 0.9 % (ref 0–1.8)
BASOPHILS # BLD: 0.05 K/UL (ref 0–0.12)
BILIRUB SERPL-MCNC: 1.3 MG/DL (ref 0.1–1.5)
BUN SERPL-MCNC: 6 MG/DL (ref 8–22)
CALCIUM SERPL-MCNC: 9.2 MG/DL (ref 8.5–10.5)
CHLORIDE SERPL-SCNC: 97 MMOL/L (ref 96–112)
CO2 SERPL-SCNC: 24 MMOL/L (ref 20–33)
CORTIS SERPL-MCNC: 10.2 UG/DL (ref 0–23)
CREAT SERPL-MCNC: 0.56 MG/DL (ref 0.5–1.4)
D DIMER PPP IA.FEU-MCNC: 0.3 UG/ML (FEU) (ref 0–0.5)
EOSINOPHIL # BLD AUTO: 0.03 K/UL (ref 0–0.51)
EOSINOPHIL NFR BLD: 0.5 % (ref 0–6.9)
ERYTHROCYTE [DISTWIDTH] IN BLOOD BY AUTOMATED COUNT: 42.5 FL (ref 35.9–50)
GLOBULIN SER CALC-MCNC: 2.5 G/DL (ref 1.9–3.5)
GLUCOSE SERPL-MCNC: 99 MG/DL (ref 65–99)
HCT VFR BLD AUTO: 40.7 % (ref 37–47)
HGB BLD-MCNC: 13.6 G/DL (ref 12–16)
IMM GRANULOCYTES # BLD AUTO: 0.01 K/UL (ref 0–0.11)
IMM GRANULOCYTES NFR BLD AUTO: 0.2 % (ref 0–0.9)
LYMPHOCYTES # BLD AUTO: 1.4 K/UL (ref 1–4.8)
LYMPHOCYTES NFR BLD: 24.9 % (ref 22–41)
MCH RBC QN AUTO: 30.8 PG (ref 27–33)
MCHC RBC AUTO-ENTMCNC: 33.4 G/DL (ref 33.6–35)
MCV RBC AUTO: 92.1 FL (ref 81.4–97.8)
MONOCYTES # BLD AUTO: 0.28 K/UL (ref 0–0.85)
MONOCYTES NFR BLD AUTO: 5 % (ref 0–13.4)
NEUTROPHILS # BLD AUTO: 3.86 K/UL (ref 2–7.15)
NEUTROPHILS NFR BLD: 68.5 % (ref 44–72)
NRBC # BLD AUTO: 0 K/UL
NRBC BLD-RTO: 0 /100 WBC
PLATELET # BLD AUTO: 238 K/UL (ref 164–446)
PMV BLD AUTO: 11.9 FL (ref 9–12.9)
POTASSIUM SERPL-SCNC: 4.3 MMOL/L (ref 3.6–5.5)
PROT SERPL-MCNC: 7.5 G/DL (ref 6–8.2)
RBC # BLD AUTO: 4.42 M/UL (ref 4.2–5.4)
SODIUM SERPL-SCNC: 132 MMOL/L (ref 135–145)
TSH SERPL DL<=0.005 MIU/L-ACNC: 1 UIU/ML (ref 0.38–5.33)
WBC # BLD AUTO: 5.6 K/UL (ref 4.8–10.8)

## 2020-09-25 PROCEDURE — 84443 ASSAY THYROID STIM HORMONE: CPT

## 2020-09-25 PROCEDURE — 86147 CARDIOLIPIN ANTIBODY EA IG: CPT

## 2020-09-25 PROCEDURE — 82024 ASSAY OF ACTH: CPT

## 2020-09-25 PROCEDURE — 82533 TOTAL CORTISOL: CPT

## 2020-09-25 PROCEDURE — 85379 FIBRIN DEGRADATION QUANT: CPT

## 2020-09-25 PROCEDURE — 81241 F5 GENE: CPT

## 2020-09-25 PROCEDURE — 85246 CLOT FACTOR VIII VW ANTIGEN: CPT

## 2020-09-25 PROCEDURE — 36415 COLL VENOUS BLD VENIPUNCTURE: CPT

## 2020-09-25 PROCEDURE — 80053 COMPREHEN METABOLIC PANEL: CPT

## 2020-09-25 PROCEDURE — 85025 COMPLETE CBC W/AUTO DIFF WBC: CPT

## 2020-09-27 LAB — ACTH PLAS-MCNC: 7.2 PG/ML (ref 7.2–63.3)

## 2020-09-28 LAB
CARDIOLIPIN IGA SER IA-ACNC: 1 APL (ref 0–11)
CARDIOLIPIN IGG SER IA-ACNC: 5 GPL (ref 0–14)
CARDIOLIPIN IGM SER IA-ACNC: 10 MPL (ref 0–12)
VWF AG ACT/NOR PPP IA: 85 % (ref 52–214)

## 2020-10-01 LAB — F5 P.R506Q BLD/T QL: NEGATIVE

## 2020-10-02 ENCOUNTER — HOSPITAL ENCOUNTER (OUTPATIENT)
Dept: LAB | Facility: MEDICAL CENTER | Age: 55
End: 2020-10-02
Attending: FAMILY MEDICINE
Payer: COMMERCIAL

## 2020-10-02 ENCOUNTER — OFFICE VISIT (OUTPATIENT)
Dept: MEDICAL GROUP | Facility: IMAGING CENTER | Age: 55
End: 2020-10-02
Payer: COMMERCIAL

## 2020-10-02 VITALS
RESPIRATION RATE: 15 BRPM | HEIGHT: 68 IN | TEMPERATURE: 97.9 F | DIASTOLIC BLOOD PRESSURE: 62 MMHG | HEART RATE: 77 BPM | SYSTOLIC BLOOD PRESSURE: 104 MMHG | WEIGHT: 120 LBS | OXYGEN SATURATION: 100 % | BODY MASS INDEX: 18.19 KG/M2

## 2020-10-02 DIAGNOSIS — E87.1 HYPONATREMIA: ICD-10-CM

## 2020-10-02 DIAGNOSIS — R63.1 EXCESSIVE THIRST: ICD-10-CM

## 2020-10-02 DIAGNOSIS — R51.9 ACUTE NONINTRACTABLE HEADACHE, UNSPECIFIED HEADACHE TYPE: ICD-10-CM

## 2020-10-02 DIAGNOSIS — R63.4 WEIGHT LOSS: ICD-10-CM

## 2020-10-02 DIAGNOSIS — Z12.31 ENCOUNTER FOR SCREENING MAMMOGRAM FOR BREAST CANCER: ICD-10-CM

## 2020-10-02 LAB
APPEARANCE UR: CLEAR
BACTERIA #/AREA URNS HPF: NEGATIVE /HPF
BILIRUB UR QL STRIP.AUTO: NEGATIVE
COLOR UR: YELLOW
EPI CELLS #/AREA URNS HPF: NEGATIVE /HPF
ERYTHROCYTE [SEDIMENTATION RATE] IN BLOOD BY WESTERGREN METHOD: 8 MM/HOUR (ref 0–30)
GLUCOSE UR STRIP.AUTO-MCNC: NEGATIVE MG/DL
HYALINE CASTS #/AREA URNS LPF: NORMAL /LPF
KETONES UR STRIP.AUTO-MCNC: NEGATIVE MG/DL
LEUKOCYTE ESTERASE UR QL STRIP.AUTO: ABNORMAL
MICRO URNS: ABNORMAL
NITRITE UR QL STRIP.AUTO: NEGATIVE
PH UR STRIP.AUTO: 7.5 [PH] (ref 5–8)
PROT UR QL STRIP: NEGATIVE MG/DL
RBC # URNS HPF: NORMAL /HPF
RBC UR QL AUTO: NEGATIVE
SP GR UR STRIP.AUTO: 1.01
UROBILINOGEN UR STRIP.AUTO-MCNC: 0.2 MG/DL
WBC #/AREA URNS HPF: NORMAL /HPF

## 2020-10-02 PROCEDURE — 85652 RBC SED RATE AUTOMATED: CPT

## 2020-10-02 PROCEDURE — 83935 ASSAY OF URINE OSMOLALITY: CPT

## 2020-10-02 PROCEDURE — 84300 ASSAY OF URINE SODIUM: CPT

## 2020-10-02 PROCEDURE — 84295 ASSAY OF SERUM SODIUM: CPT

## 2020-10-02 PROCEDURE — 36415 COLL VENOUS BLD VENIPUNCTURE: CPT

## 2020-10-02 PROCEDURE — 99214 OFFICE O/P EST MOD 30 MIN: CPT | Performed by: FAMILY MEDICINE

## 2020-10-02 PROCEDURE — 81001 URINALYSIS AUTO W/SCOPE: CPT

## 2020-10-02 PROCEDURE — 83615 LACTATE (LD) (LDH) ENZYME: CPT

## 2020-10-02 PROCEDURE — 83930 ASSAY OF BLOOD OSMOLALITY: CPT

## 2020-10-02 ASSESSMENT — FIBROSIS 4 INDEX: FIB4 SCORE: 1.123051946590399009

## 2020-10-02 ASSESSMENT — PAIN SCALES - GENERAL: PAINLEVEL: 5=MODERATE PAIN

## 2020-10-02 NOTE — PROGRESS NOTES
"  Subjective:     Chief Complaint   Patient presents with   • Establish Care   • Other     x weeks. thirsty. excessive urinating. 6-7 pound weight loss   • Headache     wake up with headache recently.      55 year old w/h/o IFG, family h/o melanoma, and hld here with muscle cramps, headache in the morning that she can not report if it is every day for if it has continued since the onset three weeks ago, also with polyuria, polydipsia, and night sweats. 7 pound weight loss in one week. She has been evaluated for these concerns one week ago with blood work.     ROS  See HPI  Constitutional: Negative for fever, chills and malaise/fatigue.   HENT: Negative for congestion, itchy watery eyes  Eyes: Negative for pain or sudden changes in vision  Respiratory: Negative for cough and shortness of breath.    Cardiovascular: Negative for leg swelling or chest pain  Gastrointestinal: Negative for nausea, vomiting, abdominal pain and diarrhea.   Genitourinary: Negative for dysuria and hematuria.   Skin: Negative for rash or concerning moles   Neurological: Negative for dizziness, focal weakness   Psychiatric/Behavioral: Negative for depression.  The patient is not nervous/anxious.    Musculoskeletal: no weakness or joint stiffness    Allergies   Allergen Reactions   • Sulfa Drugs Rash     Rash and shortness of breath   • Vicodin [Hydrocodone-Acetaminophen] Itching     \"bugs crawling on me\"       Current medicines (including changes today)  Current Outpatient Medications   Medication Sig Dispense Refill   • MELATONIN PO Take  by mouth.     • Multiple Vitamins-Minerals (MULTIVITAMIN ADULT PO) Take  by mouth.     • vitamin D (CHOLECALCIFEROL) 1000 UNIT Tab Take 1,000 Units by mouth every day.       No current facility-administered medications for this visit.        She  has a past medical history of Herpes genitalia.  She  has a past surgical history that includes appendectomy; hysteroscopy novasure-2 (N/A, 8/27/2018); and dilation " "and curettage (N/A, 2018).      Family History   Problem Relation Age of Onset   • Cancer Mother 50        breast   • Dementia Mother    • Diabetes Father    • Other Father         glioblastoma   • Thyroid Father    • Cancer Father         skin cancer   • Heart Attack Father    • Diabetes Maternal Grandmother    • No Known Problems Daughter      Family Status   Relation Name Status   • Mo     • Fa   at age 77   • Sis  Alive   • MGMo     • MGFa     • PGMo     • PGFa     • Mike  Alive       Patient Active Problem List    Diagnosis Date Noted   • IFG (impaired fasting glucose) 2019   • Dyslipidemia (high LDL; low HDL) 2019   • Genital herpes simplex 2018   • Family history of melanoma 2018   • Vitamin D deficiency 2018   • Insomnia 2018   • Healthcare maintenance 2018          Objective:   Vitals obtained by patient:  /62 (BP Location: Right arm, Patient Position: Sitting, BP Cuff Size: Adult)   Pulse 77   Temp 36.6 °C (97.9 °F) (Temporal)   Resp 15   Ht 1.727 m (5' 8\")   Wt 54.4 kg (120 lb)   SpO2 100%   BMI 18.25 kg/m²     Physical Exam:  Physical Exam   Constitutional: She is oriented to person, place, and time and well-developed, well-nourished, and in no distress. No distress.   HENT:   Head: Normocephalic and atraumatic.   Eyes: Pupils are equal, round, and reactive to light. Conjunctivae are normal. Right eye exhibits no discharge. Left eye exhibits no discharge. No scleral icterus.   Neck: No thyromegaly present.   Cardiovascular: Normal rate, regular rhythm and normal heart sounds. Exam reveals no gallop and no friction rub.   No murmur heard.  Pulmonary/Chest: Effort normal and breath sounds normal. No respiratory distress. She has no wheezes. She has no rales.   Abdominal: Soft. Bowel sounds are normal. She exhibits no distension and no mass. There is no abdominal tenderness. There is no rebound and no " guarding.   Musculoskeletal:         General: No edema.   Lymphadenopathy:     She has no cervical adenopathy.   Neurological: She is alert and oriented to person, place, and time.   Skin: Skin is warm and dry. She is not diaphoretic.         Labs:  Hospital Outpatient Visit on 09/25/2020   Component Date Value Ref Range Status   • TSH 09/25/2020 1.000  0.380 - 5.330 uIU/mL Final    Comment: Please note new reference ranges effective 12/14/2017 10:00 AM  Pregnant Females, 1st Trimester  0.050-3.700  Pregnant Females, 2nd Trimester  0.310-4.350  Pregnant Females, 3rd Trimester  0.410-5.180     • Sodium 09/25/2020 132* 135 - 145 mmol/L Final   • Potassium 09/25/2020 4.3  3.6 - 5.5 mmol/L Final   • Chloride 09/25/2020 97  96 - 112 mmol/L Final   • Co2 09/25/2020 24  20 - 33 mmol/L Final   • Anion Gap 09/25/2020 11.0  7.0 - 16.0 Final   • Glucose 09/25/2020 99  65 - 99 mg/dL Final   • Bun 09/25/2020 6* 8 - 22 mg/dL Final   • Creatinine 09/25/2020 0.56  0.50 - 1.40 mg/dL Final   • Calcium 09/25/2020 9.2  8.5 - 10.5 mg/dL Final   • AST(SGOT) 09/25/2020 21  12 - 45 U/L Final   • ALT(SGPT) 09/25/2020 18  2 - 50 U/L Final   • Alkaline Phosphatase 09/25/2020 52  30 - 99 U/L Final   • Total Bilirubin 09/25/2020 1.3  0.1 - 1.5 mg/dL Final   • Albumin 09/25/2020 5.0* 3.2 - 4.9 g/dL Final   • Total Protein 09/25/2020 7.5  6.0 - 8.2 g/dL Final   • Globulin 09/25/2020 2.5  1.9 - 3.5 g/dL Final   • A-G Ratio 09/25/2020 2.0  g/dL Final   • WBC 09/25/2020 5.6  4.8 - 10.8 K/uL Final   • RBC 09/25/2020 4.42  4.20 - 5.40 M/uL Final   • Hemoglobin 09/25/2020 13.6  12.0 - 16.0 g/dL Final   • Hematocrit 09/25/2020 40.7  37.0 - 47.0 % Final   • MCV 09/25/2020 92.1  81.4 - 97.8 fL Final   • MCH 09/25/2020 30.8  27.0 - 33.0 pg Final   • MCHC 09/25/2020 33.4* 33.6 - 35.0 g/dL Final   • RDW 09/25/2020 42.5  35.9 - 50.0 fL Final   • Platelet Count 09/25/2020 238  164 - 446 K/uL Final   • MPV 09/25/2020 11.9  9.0 - 12.9 fL Final   •  Neutrophils-Polys 09/25/2020 68.50  44.00 - 72.00 % Final   • Lymphocytes 09/25/2020 24.90  22.00 - 41.00 % Final   • Monocytes 09/25/2020 5.00  0.00 - 13.40 % Final   • Eosinophils 09/25/2020 0.50  0.00 - 6.90 % Final   • Basophils 09/25/2020 0.90  0.00 - 1.80 % Final   • Immature Granulocytes 09/25/2020 0.20  0.00 - 0.90 % Final   • Nucleated RBC 09/25/2020 0.00  /100 WBC Final   • Neutrophils (Absolute) 09/25/2020 3.86  2.00 - 7.15 K/uL Final    Includes immature neutrophils, if present.   • Lymphs (Absolute) 09/25/2020 1.40  1.00 - 4.80 K/uL Final   • Monos (Absolute) 09/25/2020 0.28  0.00 - 0.85 K/uL Final   • Eos (Absolute) 09/25/2020 0.03  0.00 - 0.51 K/uL Final   • Baso (Absolute) 09/25/2020 0.05  0.00 - 0.12 K/uL Final   • Immature Granulocytes (abs) 09/25/2020 0.01  0.00 - 0.11 K/uL Final   • NRBC (Absolute) 09/25/2020 0.00  K/uL Final   • vWF Antigen 09/25/2020 85  52 - 214 % Final    Comment: REFERENCE INTERVAL: von Willebrand Factor, Antigen  Access complete set of age- and/or gender-specific reference  intervals for this test in the [facility] Test Directory  ([email or web address]).  Performed by [facility],  [address] [place] [id number] [phone]  [email or web address], Tanya Landers MD - Lab. Director     • V Leiden Factor 09/25/2020 Negative   Final    Comment: Indication for testing: Assess genetic risk for thrombosis.  NEGATIVE: The factor V Leiden variant, c.1601G>A; p.[id number], was  not detected. This does not exclude a genetic cause for  thrombophilia. If this individual has had a previous venous  thromboembolism, this negative result is unlikely to significantly  reduce the risk for recurrence; thus, future clinical management  to reduce recurrence should not be altered.  This result has been reviewed and approved by Pardeep Laboy, Ph.D.  BACKGROUND INFORMATION: Factor V Leiden (F5) R506Q Mutation  CHARACTERISTICS: Venous thromboembolism (VTE) is multifactorial  caused by a combination of genetic  and environmental factors. The  Factor V Leiden (FVL) variant is the most common cause of  inherited VTEs, accounting for over 90 percent of activated  protein C (APC) resistance. Because the FVL variant eliminates the  APC cleavage site, factor V is inactivated slower, thus persisting  longer in blood circulation, leading to more thrombin productio                           n.  Other genetic risk factors for VTE include, male sex and variants  in antithrombin, protein C, protein S, or factor XIII. Non-genetic  risk factors include, age, smoking, prolonged immobilization,  malignant neoplasms, surgery, pregnancy, oral contraceptives,  estrogen replacement therapy, tamoxifen and raloxifene therapy.  INCIDENCE OF FACTOR V LEIDEN VARIANT: Approximately 5 percent of  Caucasians, 2 percent of Hispanics, 1 percent of  Americans  and 0.5 percent of Asians are  heterozygous; homozygosity occurs in 1 in 1500 Caucasians.  INHERITANCE: Semi-dominant; both heterozygotes and homozygotes are  at increased risk for VTE.  PENETRANCE: Lifetime risk of VTE is 10 percent for heterozygotes  and 80 percent of homozygotes.  CAUSE: The pathogenic gain of function in the F5 gene variant  c.1601G>A (p.[id number]). Legacy nomenclature: R506Q (1691G>A)  CLINICAL SENSITIVITY: 20-50 percent of individuals with an  isolated VTE have the FVL variant.  METHODOLOGY: Polymerase chain                            reaction and fluorescence monitoring.  ANALYTICAL SENSITIVITY AND SPECIFICITY: 99 percent.  LIMITATIONS: Diagnostic errors can occur due to rare sequence  variations. F5 gene mutations, other than p.[id number], will not be  detected.  This test was developed and its performance characteristics  determined by [facility]. It has not been cleared or  approved by the US Food and Drug Administration. This test was  performed in a CLIA certified laboratory and is intended for  clinical purposes.  Counseling and informed consent are  recommended for genetic  testing. Consent forms are available online.  Performed by [facility],  [address] [place] [address] [phone]  [email or web address], Tanya Landers MD - Lab. Director     • D-Dimer Screen 09/25/2020 0.30  0.00 - 0.50 ug/mL (FEU) Final    Comment: Effective 09/18/2018, please note new units of measure.  A negative D-Dimer result when combined with a clinical  assessment of low probability has been shown to have a high  negative predictive value for DVT or PE.  This assay should not be used independently to exclude or  diagnose DVT or Pulmonary Embolism.  This test methodology does not differentiate between DVT and  PE when an elevation in results is demonstrated.     • Cortisol 09/25/2020 10.2  0.0 - 23.0 ug/dL Final    Comment: Male                   Female  Age                 ug/dL                   ug/dL  5th day              0.6 - 19.8             0.6 - 19.8  2 - 12 month         2.4 - 22.9             2.4 - 22.9  2 - 13 year          2.5 - 22.9             2.5 - 22.9  14 - 15 year         2.5 - 22.9             2.4 - 28.6  16 - 18 year         2.4 - 28.6             2.4 - 28.6  >18years:  AM Cortisol (0800 hours):  6.0-23.0 ug/dL  PM Cortisol (2000 hours):  0.0-9.0 ug/dL  ACTH Stimulation Test: 30-60 minutes post 250 mcg cosyntropin  I.V. A rise of >20 mcg/dL rules out primary adrenal insuffic-  iency but some patients with primary adrenal insufficiency  may have a rise in cortisol less than 20 mcg/dL.     • Acth 09/25/2020 7.2  7.2 - 63.3 pg/mL Final    Comment: INTERPRETIVE INFORMATION: Adrenocorticotropic Hormone  Reference interval based on samples collected between 7 a.m. and  10 a.m.  No reference intervals established for p.m. collections.  Pediatric reference values are the same as adults (Acta Paediatr  Scand 1981;70:341-345).  This assay measures intact ACTH 1-39;  some types of synthetic ACTH and ACTH fragments are not detected  by this assay.  Performed By: [facility]  [address] [address]  : Tanya Landers MD     • Anti-Cariolipin Ab Igg 09/25/2020 5  0 - 14 GPL Final    Comment: INTERPRETIVE INFORMATION: Anti-Cardiolipin IgG Ab  0-14 GPL: Negative  15-19 GPL: Indeterminate  20-80 GPL: Low to Moderately Positive  81 GPL or above: High Positive  The persistent presence of IgG and/or IgM cardiolipin (CL)  antibodies in moderate or high levels (greater than 40 GPL and/or  greater  than 40 MPL units or greater than 99th percentile) is a  laboratory criterion for the diagnosis of antiphospholipid  syndrome (APS). Persistence is defined as moderate or high levels  of IgG and/or IgM CL antibodies detected in two or more specimens  drawn at least 12 weeks apart (J Throm Haemost. 2006;4:295-306).  Lower positive levels of IgG and/or IgM CL antibodies (above  cutoff but less than 40 GPL and/or less than 40 MPL units) may  occur in patients with the clinical symptoms of APS; therefore,  the actual significance of these levels is undefined. Results  should not be used alone for diagnosis and must be interpreted in  light of APS-specific clinical manifestations and/or other                             criteria phospholipid antibody tests.     • Anti-Cardiolipin Ab Igm 09/25/2020 10  0 - 12 MPL Final    Comment: INTERPRETIVE INFORMATION: Anti-Cardiolipin IgM  0-12 MPL: Negative  13-19 MPL: Indeterminate  20-80 MPL: Low to Moderately Positive  81 MPL or above: High Positive  The persistent presence of IgG and/or IgM cardiolipin (CL)  antibodies in moderate or high levels (greater than 40 GPL and/or  greater than 40 MPL units or greater than 99th percentile) is a  laboratory criterion for the diagnosis of antiphospholipid  syndrome (APS). Persistence is defined as moderate or high levels  of IgG and/or IgM CL antibodies detected  in two or more specimens  drawn at least 12 weeks apart (J Throm Haemost. 2006;4:295-306).  Lower positive  levels of IgG and/or IgM CL antibodies (above  cutoff but less than 40 GPL and/or less than 40 MPL units) may  occur in patients with the clinical symptoms of APS; therefore,  the actual significance of these levels is undefined. Results  should not be used alone for diagnosis and must be interpreted in  light of APS-specific clinical manifestations and/or other  c                           riteria phospholipid antibody tests.     • Anti-Cardiolipin Ab Iga 09/25/2020 1  0 - 11 APL Final    Comment: INTERPRETIVE INFORMATION: Cardiolipin Antibodies, IgA  0-11 APL: Negative  12-19 APL: Indeterminate  20-80 APL: Low to Moderately  Positive  81 APL or above: High Positive  Performed By: [facility]  [address] [place] [address]  : Tanya Landers MD     • GFR If  09/25/2020 >60  >60 mL/min/1.73 m 2 Final   • GFR If Non  09/25/2020 >60  >60 mL/min/1.73 m 2 Final       Imaging:   No results found.    Assessment and Plan:   The following treatment plan was discussed:   Reviewed prior labs with patient. She is hyponatremic. Will finish the weight loss workup. She is having night sweats though cbc completely normal. Pending workup may touch base with oncology for more insight. Will also hold off on mri brain w new headache in 50+ year old. She will pay attention to the headache, I will see her in one week if it is daily will order mri brain. Discussed with patient she is ok with this plan.     Asked patient to have all supplements and medications on hand for follow  Up    In review of her prior workup it does not look like she mentioned these symptoms to prior provider the work up was more for fatigue as far as I can tell  Problem List Items Addressed This Visit     Hyponatremia    Relevant Orders    URINE SODIUM RANDOM    SODIUM SERUM (NA)    OSMOLALITY URINE    OSMOLALITY SERUM    Acute nonintractable headache    Weight loss    Relevant Orders     DX-CHEST-2 VIEWS    LDH    US-ABDOMEN COMPLETE SURVEY    URINALYSIS,CULTURE IF INDICATED    Sed Rate    Excessive thirst      Other Visit Diagnoses     Encounter for screening mammogram for breast cancer        Relevant Orders    MA-SCREENING MAMMO BILAT W/TOMOSYNTHESIS W/CAD        Follow-up: Return in about 1 week (around 10/9/2020).        Portions of this note may be dictated using Dragon NaturallySpeaChupaMobile voice recognition software.  Variances in spelling and vocabulary are possible and unintentional.  Not all areas may be caught/corrected.  Please notify me if any discrepancies are noted or if the meaning of any statement is not correct/clear.

## 2020-10-03 LAB
LDH SERPL L TO P-CCNC: 210 U/L (ref 107–266)
OSMOLALITY SERPL: 296 MOSM/KG H2O (ref 278–298)
OSMOLALITY UR: 165 MOSM/KG H2O (ref 300–900)
SODIUM SERPL-SCNC: 138 MMOL/L (ref 135–145)
SODIUM UR-SCNC: 27 MMOL/L

## 2020-10-20 ENCOUNTER — HOSPITAL ENCOUNTER (OUTPATIENT)
Dept: RADIOLOGY | Facility: MEDICAL CENTER | Age: 55
End: 2020-10-20
Attending: FAMILY MEDICINE
Payer: COMMERCIAL

## 2020-10-20 DIAGNOSIS — Z12.31 ENCOUNTER FOR SCREENING MAMMOGRAM FOR BREAST CANCER: ICD-10-CM

## 2020-10-20 DIAGNOSIS — R63.4 WEIGHT LOSS: ICD-10-CM

## 2020-10-20 PROCEDURE — 71046 X-RAY EXAM CHEST 2 VIEWS: CPT

## 2020-10-20 PROCEDURE — 77067 SCR MAMMO BI INCL CAD: CPT

## 2020-10-20 PROCEDURE — 76700 US EXAM ABDOM COMPLETE: CPT

## 2020-10-23 ENCOUNTER — TELEMEDICINE (OUTPATIENT)
Dept: MEDICAL GROUP | Facility: IMAGING CENTER | Age: 55
End: 2020-10-23
Payer: COMMERCIAL

## 2020-10-23 VITALS — WEIGHT: 118 LBS | HEART RATE: 72 BPM | BODY MASS INDEX: 17.88 KG/M2 | HEIGHT: 68 IN

## 2020-10-23 DIAGNOSIS — E87.1 HYPONATREMIA: ICD-10-CM

## 2020-10-23 DIAGNOSIS — Z78.0 MENOPAUSE: ICD-10-CM

## 2020-10-23 DIAGNOSIS — R63.4 ABNORMAL WEIGHT LOSS: ICD-10-CM

## 2020-10-23 PROCEDURE — 99214 OFFICE O/P EST MOD 30 MIN: CPT | Mod: 95,CR | Performed by: FAMILY MEDICINE

## 2020-10-23 RX ORDER — SODIUM CHLORIDE 1 G/1
TABLET ORAL
COMMUNITY
Start: 2020-09-30 | End: 2021-03-17

## 2020-10-23 ASSESSMENT — FIBROSIS 4 INDEX: FIB4 SCORE: 1.123051946590399009

## 2020-10-23 ASSESSMENT — PAIN SCALES - GENERAL: PAINLEVEL: NO PAIN

## 2020-10-23 NOTE — ASSESSMENT & PLAN NOTE
She prefers herbal remedies. Discussed evening primrose 500mg. discussed headache and gi upset. Ginseng 200mg for fatgue though insufficient date. Vit e 50mg for hotflashes. Discussed avoiding red clover. insuf datea black cohosh and ginko.

## 2021-02-23 ENCOUNTER — HOSPITAL ENCOUNTER (OUTPATIENT)
Dept: LAB | Facility: MEDICAL CENTER | Age: 56
End: 2021-02-23
Attending: INTERNAL MEDICINE
Payer: COMMERCIAL

## 2021-02-23 DIAGNOSIS — E87.1 HYPONATREMIA: ICD-10-CM

## 2021-02-23 LAB
ANION GAP SERPL CALC-SCNC: 9 MMOL/L (ref 7–16)
BUN SERPL-MCNC: 5 MG/DL (ref 8–22)
CALCIUM SERPL-MCNC: 10.3 MG/DL (ref 8.5–10.5)
CHLORIDE SERPL-SCNC: 102 MMOL/L (ref 96–112)
CO2 SERPL-SCNC: 26 MMOL/L (ref 20–33)
CREAT SERPL-MCNC: 0.56 MG/DL (ref 0.5–1.4)
GLUCOSE SERPL-MCNC: 92 MG/DL (ref 65–99)
POTASSIUM SERPL-SCNC: 5.4 MMOL/L (ref 3.6–5.5)
SODIUM SERPL-SCNC: 137 MMOL/L (ref 135–145)

## 2021-02-23 PROCEDURE — 80048 BASIC METABOLIC PNL TOTAL CA: CPT

## 2021-02-23 PROCEDURE — 36415 COLL VENOUS BLD VENIPUNCTURE: CPT

## 2021-03-17 ENCOUNTER — OFFICE VISIT (OUTPATIENT)
Dept: MEDICAL GROUP | Facility: MEDICAL CENTER | Age: 56
End: 2021-03-17
Payer: COMMERCIAL

## 2021-03-17 VITALS
HEIGHT: 68 IN | OXYGEN SATURATION: 99 % | DIASTOLIC BLOOD PRESSURE: 70 MMHG | BODY MASS INDEX: 17.88 KG/M2 | SYSTOLIC BLOOD PRESSURE: 118 MMHG | TEMPERATURE: 97.3 F | HEART RATE: 78 BPM | RESPIRATION RATE: 16 BRPM | WEIGHT: 117.95 LBS

## 2021-03-17 DIAGNOSIS — E78.5 DYSLIPIDEMIA (HIGH LDL; LOW HDL): ICD-10-CM

## 2021-03-17 DIAGNOSIS — E55.9 VITAMIN D DEFICIENCY: ICD-10-CM

## 2021-03-17 DIAGNOSIS — R73.01 IMPAIRED FASTING GLUCOSE: ICD-10-CM

## 2021-03-17 DIAGNOSIS — L65.9 HAIR LOSS: ICD-10-CM

## 2021-03-17 PROBLEM — E87.1 HYPONATREMIA: Status: RESOLVED | Noted: 2020-10-02 | Resolved: 2021-03-17

## 2021-03-17 PROCEDURE — 99214 OFFICE O/P EST MOD 30 MIN: CPT | Performed by: INTERNAL MEDICINE

## 2021-03-17 RX ORDER — CHOLESTYRAMINE 4 G/9G
1 POWDER, FOR SUSPENSION ORAL 2 TIMES DAILY
COMMUNITY
Start: 2021-03-02 | End: 2021-05-21

## 2021-03-17 RX ORDER — THYROID 60 MG
60 TABLET ORAL
COMMUNITY
Start: 2021-01-16 | End: 2021-07-14

## 2021-03-17 ASSESSMENT — FIBROSIS 4 INDEX: FIB4 SCORE: 1.123051946590399009

## 2021-03-17 ASSESSMENT — PATIENT HEALTH QUESTIONNAIRE - PHQ9: CLINICAL INTERPRETATION OF PHQ2 SCORE: 0

## 2021-03-17 NOTE — PROGRESS NOTES
"CC:  Diagnoses of Hair loss, IFG (impaired fasting glucose), Vitamin D deficiency, and Dyslipidemia (high LDL; low HDL) were pertinent to this visit.    HISTORY OF THE PRESENT ILLNESS: Patient is a 55 y.o. female. This pleasant patient is here today for follow-up.      Patient had briefly changed PCPs in October and now returns for follow-up.  She also continues to see her \"integrative specialist \"Hemanth Bills who has given her chelation therapy, various unknown infusions, salt tablets and now more recently [place] Thyroid (patient states she started this 2 months ago, despite normal thyroid function labs in September) and cholestyramine (x 2mo).    She continues to have the same complaints of fatigue, hair loss, etc. see my prior extensive notes.  It has been suggested to her that some of her symptoms are due to menopause and her gynecologist did offer to start her on hormonal management, patient indicates she is still considering this.  She does have a dermatologist she sees regularly but she does not wish to use any topical or oral medication for her complaint.    Labs from 9/25/2020 TSH normal 1.0, CBC normal as well as many other extensive labs that her integrative specialist Hemanth requested were also normal (and that I would not have otherwise ordered but the patient had requested including factor V Leiden, D-dimer, cortisol, etc.).  Normal sed rate 10/2/2020.    While she was undergoing unknown treatment from her integrative specialist \"Hemanth\" she did develop hyponatremia and this has since resolved  on most recent labs 2/23/2021 and sodium normalized to 137.    I have asked her to engage in working with a psychologist, she says she has yet to do that because of the pandemic.    Ultimately I have discussed with the patient that she needs to choose if she wants an evidence-based medicine approach to her care.  I have told her I do not agree with the chelation therapy, starting thyroid supplements when " thyroid function normal on labs, etc.  I discussed with her she should consider going to the integrative medicine clinic through [facility] where she can see a medical doctor and then stick with 1 primary physician.      Allergies: Sulfa drugs and Vicodin [hydrocodone-acetaminophen]    Current Outpatient Medications Ordered in Epic   Medication Sig Dispense Refill   • MELATONIN PO Take  by mouth.     • Multiple Vitamins-Minerals (MULTIVITAMIN ADULT PO) Take  by mouth.     • vitamin D (CHOLECALCIFEROL) 1000 UNIT Tab Take 1,000 Units by mouth every day.     • cholestyramine (QUESTRAN) 4 g packet Take 1 Packet by mouth 2 Times a Day.     • ARMOUR THYROID 60 MG Tab Take 60 mg by mouth every morning before breakfast.       No current Epic-ordered facility-administered medications on file.       Past Medical History:   Diagnosis Date   • Herpes genitalia        Past Surgical History:   Procedure Laterality Date   • HYSTEROSCOPY NOVASURE-2 N/A 8/27/2018    Procedure: HYSTEROSCOPY NOVASURE;  Surgeon: Amira Dbuois M.D.;  Location: SURGERY SAME DAY [facility];  Service: Gynecology   • DILATION AND CURETTAGE N/A 8/27/2018    Procedure: DILATION AND CURETTAGE;  Surgeon: Amira Dubois M.D.;  Location: SURGERY SAME DAY [facility];  Service: Gynecology   • APPENDECTOMY      emergent       Social History     Tobacco Use   • Smoking status: Never Smoker   • Smokeless tobacco: Never Used   Substance Use Topics   • Alcohol use: No   • Drug use: No       Social History     Social History Narrative   • Not on file       Family History   Problem Relation Age of Onset   • Cancer Mother 50        breast   • Dementia Mother    • Diabetes Father    • Other Father         glioblastoma   • Thyroid Father    • Cancer Father         skin cancer   • Heart Attack Father    • Diabetes Maternal Grandmother    • No Known Problems Daughter        Exam: /70 (BP Location: Left arm, Patient Position: Sitting)   Pulse 78   Temp 36.3 °C  "(97.3 °F) (Temporal)   Resp 16   Ht 1.727 m (5' 8\")   Wt 53.5 kg (117 lb 15.1 oz)   SpO2 99%  Body mass index is 17.93 kg/m².    General: Normal appearing. No distress.  EYES: Conjunctiva clear lids without ptosis, pupils equal  EARS: Normal shape and contour   Pulmonary: Clear to ausculation.  Normal effort. No rales or wheezing.  Cardiovascular: Regular rate and rhythm   Abdomen: Soft, nontender, nondistended. Normal bowel sounds.  Neurologic: Cranial nerves grossly nonfocal  Skin: Warm and dry.  Some fine hair loss throughout mostly towards back of scalp  Musculoskeletal: Normal gait. No extremity cyanosis, clubbing, or edema.  Psych: Alert and orientated    Assessment/Plan  1. Hair loss  Most likely normal hair loss with age.  She recently had many normal labs in September and wishes to have these repeated for this chronic complaint.  She declines topical Rogaine or even oral pharmacotherapy for her hair loss complaint.  She already has a dermatologist she follows with regularly.  - CBC WITH DIFFERENTIAL; Future  - TSH; Future  - FREE THYROXINE; Future  - RPR  - FERRITIN; Future  - REFERRAL TO [facility]    2. IFG (impaired fasting glucose)  A1c was normal 10 months ago, plan to reassess to see if any component with her chronic fatigue.  Though she is aware that fatigue and related symptoms probably due to menopause and she already discussed with her gynecologist who offered her treatment for this.  - CBC WITH DIFFERENTIAL; Future  - HEMOGLOBIN A1C; Future  - REFERRAL TO [facility]    3. Vitamin D deficiency  Level was normal 2 months ago plan to reassess status.  - VITAMIN D,25 HYDROXY; Future  - REFERRAL TO [facility]    4. Dyslipidemia (high LDL; low HDL)  Plan to reassess status  - Lipid Profile; Future  - REFERRAL TO [facility]    Strongly encouraged her to engage in the free counseling, since she is a [facility] resident that has now provided through the " C[employer] Act    Referral to integrative medicine clinic, again strongly encouraged patient to stick with one primary physician and I told her I think that a medical doctor at the integrated clinic would be a better fit.  Again I discussed with her I do not agree with the current approach by Hemanth to give her [place] Thyroid with normal thyroid function, chelation therapy, etc. and patient does understand this.          Please note that this dictation was created using voice recognition software. I have made every reasonable attempt to correct obvious errors, but I expect that there are errors of grammar and possibly content that I did not discover before finalizing the note.

## 2021-04-13 ENCOUNTER — IMMUNIZATION (OUTPATIENT)
Dept: FAMILY PLANNING/WOMEN'S HEALTH CLINIC | Facility: IMMUNIZATION CENTER | Age: 56
End: 2021-04-13
Payer: COMMERCIAL

## 2021-04-13 DIAGNOSIS — Z23 ENCOUNTER FOR VACCINATION: Primary | ICD-10-CM

## 2021-04-13 PROCEDURE — 91300 PFIZER SARS-COV-2 VACCINE: CPT

## 2021-04-13 PROCEDURE — 0001A PFIZER SARS-COV-2 VACCINE: CPT

## 2021-04-19 ENCOUNTER — HOSPITAL ENCOUNTER (OUTPATIENT)
Dept: LAB | Facility: MEDICAL CENTER | Age: 56
End: 2021-04-19
Attending: FAMILY MEDICINE
Payer: COMMERCIAL

## 2021-04-19 LAB
ALBUMIN SERPL BCP-MCNC: 5.1 G/DL (ref 3.2–4.9)
ALBUMIN/GLOB SERPL: 1.6 G/DL
ALP SERPL-CCNC: 59 U/L (ref 30–99)
ALT SERPL-CCNC: 14 U/L (ref 2–50)
ANION GAP SERPL CALC-SCNC: 11 MMOL/L (ref 7–16)
APPEARANCE UR: CLEAR
AST SERPL-CCNC: 18 U/L (ref 12–45)
BACTERIA #/AREA URNS HPF: NEGATIVE /HPF
BASOPHILS # BLD AUTO: 1 % (ref 0–1.8)
BASOPHILS # BLD: 0.06 K/UL (ref 0–0.12)
BILIRUB SERPL-MCNC: 1.8 MG/DL (ref 0.1–1.5)
BILIRUB UR QL STRIP.AUTO: NEGATIVE
BUN SERPL-MCNC: 8 MG/DL (ref 8–22)
CALCIUM SERPL-MCNC: 9.7 MG/DL (ref 8.5–10.5)
CHLORIDE SERPL-SCNC: 96 MMOL/L (ref 96–112)
CHOLEST SERPL-MCNC: 240 MG/DL (ref 100–199)
CO2 SERPL-SCNC: 25 MMOL/L (ref 20–33)
COLOR UR: YELLOW
CREAT SERPL-MCNC: 0.58 MG/DL (ref 0.5–1.4)
EOSINOPHIL # BLD AUTO: 0.06 K/UL (ref 0–0.51)
EOSINOPHIL NFR BLD: 1 % (ref 0–6.9)
EPI CELLS #/AREA URNS HPF: NEGATIVE /HPF
ERYTHROCYTE [DISTWIDTH] IN BLOOD BY AUTOMATED COUNT: 43.1 FL (ref 35.9–50)
EST. AVERAGE GLUCOSE BLD GHB EST-MCNC: 114 MG/DL
FASTING STATUS PATIENT QL REPORTED: NORMAL
GLOBULIN SER CALC-MCNC: 3.1 G/DL (ref 1.9–3.5)
GLUCOSE SERPL-MCNC: 87 MG/DL (ref 65–99)
GLUCOSE UR STRIP.AUTO-MCNC: NEGATIVE MG/DL
HBA1C MFR BLD: 5.6 % (ref 4–5.6)
HCT VFR BLD AUTO: 43.5 % (ref 37–47)
HCYS SERPL-SCNC: 6.55 UMOL/L
HDLC SERPL-MCNC: 99 MG/DL
HGB BLD-MCNC: 14.3 G/DL (ref 12–16)
IMM GRANULOCYTES # BLD AUTO: 0.01 K/UL (ref 0–0.11)
IMM GRANULOCYTES NFR BLD AUTO: 0.2 % (ref 0–0.9)
KETONES UR STRIP.AUTO-MCNC: NEGATIVE MG/DL
LDLC SERPL CALC-MCNC: 125 MG/DL
LEUKOCYTE ESTERASE UR QL STRIP.AUTO: ABNORMAL
LYMPHOCYTES # BLD AUTO: 2.21 K/UL (ref 1–4.8)
LYMPHOCYTES NFR BLD: 35.8 % (ref 22–41)
MCH RBC QN AUTO: 30.5 PG (ref 27–33)
MCHC RBC AUTO-ENTMCNC: 32.9 G/DL (ref 33.6–35)
MCV RBC AUTO: 92.8 FL (ref 81.4–97.8)
MICRO URNS: ABNORMAL
MONOCYTES # BLD AUTO: 0.28 K/UL (ref 0–0.85)
MONOCYTES NFR BLD AUTO: 4.5 % (ref 0–13.4)
NEUTROPHILS # BLD AUTO: 3.56 K/UL (ref 2–7.15)
NEUTROPHILS NFR BLD: 57.5 % (ref 44–72)
NITRITE UR QL STRIP.AUTO: NEGATIVE
NRBC # BLD AUTO: 0 K/UL
NRBC BLD-RTO: 0 /100 WBC
PH UR STRIP.AUTO: 7 [PH] (ref 5–8)
PLATELET # BLD AUTO: 224 K/UL (ref 164–446)
PMV BLD AUTO: 12.3 FL (ref 9–12.9)
POTASSIUM SERPL-SCNC: 4.1 MMOL/L (ref 3.6–5.5)
PROGEST SERPL-MCNC: 1.35 NG/ML
PROT SERPL-MCNC: 8.2 G/DL (ref 6–8.2)
PROT UR QL STRIP: NEGATIVE MG/DL
RBC # BLD AUTO: 4.69 M/UL (ref 4.2–5.4)
RBC # URNS HPF: NORMAL /HPF
RBC UR QL AUTO: NEGATIVE
RHEUMATOID FACT SER IA-ACNC: <10 IU/ML (ref 0–14)
SODIUM SERPL-SCNC: 132 MMOL/L (ref 135–145)
SP GR UR STRIP.AUTO: 1
T3 SERPL-MCNC: 91.2 NG/DL (ref 60–181)
T4 SERPL-MCNC: 5 UG/DL (ref 4–12)
TREPONEMA PALLIDUM IGG+IGM AB [PRESENCE] IN SERUM OR PLASMA BY IMMUNOASSAY: NORMAL
TRIGL SERPL-MCNC: 81 MG/DL (ref 0–149)
TSH SERPL DL<=0.005 MIU/L-ACNC: 0.48 UIU/ML (ref 0.38–5.33)
UROBILINOGEN UR STRIP.AUTO-MCNC: 0.2 MG/DL
VIT B12 SERPL-MCNC: 3411 PG/ML (ref 211–911)
WBC # BLD AUTO: 6.2 K/UL (ref 4.8–10.8)
WBC #/AREA URNS HPF: NORMAL /HPF

## 2021-04-19 PROCEDURE — 85025 COMPLETE CBC W/AUTO DIFF WBC: CPT

## 2021-04-19 PROCEDURE — 86376 MICROSOMAL ANTIBODY EACH: CPT

## 2021-04-19 PROCEDURE — 82306 VITAMIN D 25 HYDROXY: CPT

## 2021-04-19 PROCEDURE — 82670 ASSAY OF TOTAL ESTRADIOL: CPT

## 2021-04-19 PROCEDURE — 84403 ASSAY OF TOTAL TESTOSTERONE: CPT

## 2021-04-19 PROCEDURE — 80061 LIPID PANEL: CPT

## 2021-04-19 PROCEDURE — 86038 ANTINUCLEAR ANTIBODIES: CPT

## 2021-04-19 PROCEDURE — 80053 COMPREHEN METABOLIC PANEL: CPT

## 2021-04-19 PROCEDURE — 82607 VITAMIN B-12: CPT

## 2021-04-19 PROCEDURE — 81001 URINALYSIS AUTO W/SCOPE: CPT

## 2021-04-19 PROCEDURE — 36415 COLL VENOUS BLD VENIPUNCTURE: CPT

## 2021-04-19 PROCEDURE — 84144 ASSAY OF PROGESTERONE: CPT

## 2021-04-19 PROCEDURE — 83090 ASSAY OF HOMOCYSTEINE: CPT

## 2021-04-19 PROCEDURE — 86780 TREPONEMA PALLIDUM: CPT

## 2021-04-19 PROCEDURE — 84436 ASSAY OF TOTAL THYROXINE: CPT

## 2021-04-19 PROCEDURE — 84443 ASSAY THYROID STIM HORMONE: CPT

## 2021-04-19 PROCEDURE — 84480 ASSAY TRIIODOTHYRONINE (T3): CPT

## 2021-04-19 PROCEDURE — 83036 HEMOGLOBIN GLYCOSYLATED A1C: CPT

## 2021-04-19 PROCEDURE — 86431 RHEUMATOID FACTOR QUANT: CPT

## 2021-04-20 LAB — 25(OH)D3 SERPL-MCNC: 56 NG/ML (ref 30–80)

## 2021-04-21 LAB
ESTRADIOL SERPL-MCNC: <15 PG/ML
NUCLEAR IGG SER QL IA: NORMAL
THYROPEROXIDASE AB SERPL-ACNC: 0.7 IU/ML (ref 0–9)

## 2021-04-25 LAB — TESTOST SERPL-MCNC: 25 NG/DL (ref 9–55)

## 2021-05-06 ENCOUNTER — IMMUNIZATION (OUTPATIENT)
Dept: FAMILY PLANNING/WOMEN'S HEALTH CLINIC | Facility: IMMUNIZATION CENTER | Age: 56
End: 2021-05-06
Payer: COMMERCIAL

## 2021-05-06 DIAGNOSIS — Z23 ENCOUNTER FOR VACCINATION: Primary | ICD-10-CM

## 2021-05-06 PROCEDURE — 91300 PFIZER SARS-COV-2 VACCINE: CPT | Performed by: INTERNAL MEDICINE

## 2021-05-06 PROCEDURE — 0002A PFIZER SARS-COV-2 VACCINE: CPT | Performed by: INTERNAL MEDICINE

## 2021-05-21 ENCOUNTER — APPOINTMENT (OUTPATIENT)
Dept: RADIOLOGY | Facility: MEDICAL CENTER | Age: 56
End: 2021-05-21
Attending: EMERGENCY MEDICINE
Payer: COMMERCIAL

## 2021-05-21 ENCOUNTER — APPOINTMENT (OUTPATIENT)
Dept: CARDIOLOGY | Facility: MEDICAL CENTER | Age: 56
End: 2021-05-21
Attending: INTERNAL MEDICINE
Payer: COMMERCIAL

## 2021-05-21 ENCOUNTER — HOSPITAL ENCOUNTER (OUTPATIENT)
Facility: MEDICAL CENTER | Age: 56
End: 2021-05-23
Attending: EMERGENCY MEDICINE | Admitting: INTERNAL MEDICINE
Payer: COMMERCIAL

## 2021-05-21 DIAGNOSIS — R53.1 WEAKNESS: ICD-10-CM

## 2021-05-21 PROBLEM — Z98.890 HISTORY OF ENDOMETRIAL ABLATION: Status: ACTIVE | Noted: 2021-05-21

## 2021-05-21 PROBLEM — H02.411: Status: ACTIVE | Noted: 2021-05-21

## 2021-05-21 PROBLEM — R53.81 MALAISE: Status: ACTIVE | Noted: 2021-05-21

## 2021-05-21 PROBLEM — R63.5 WEIGHT GAIN: Status: ACTIVE | Noted: 2021-05-21

## 2021-05-21 LAB
ABO + RH BLD: NORMAL
ABO GROUP BLD: NORMAL
ALBUMIN SERPL BCP-MCNC: 4.7 G/DL (ref 3.2–4.9)
ALBUMIN/GLOB SERPL: 1.7 G/DL
ALP SERPL-CCNC: 63 U/L (ref 30–99)
ALT SERPL-CCNC: 19 U/L (ref 2–50)
ANION GAP SERPL CALC-SCNC: 12 MMOL/L (ref 7–16)
APTT PPP: 33.8 SEC (ref 24.7–36)
AST SERPL-CCNC: 21 U/L (ref 12–45)
BASOPHILS # BLD AUTO: 0.9 % (ref 0–1.8)
BASOPHILS # BLD: 0.06 K/UL (ref 0–0.12)
BILIRUB SERPL-MCNC: 1.5 MG/DL (ref 0.1–1.5)
BLD GP AB SCN SERPL QL: NORMAL
BUN SERPL-MCNC: 8 MG/DL (ref 8–22)
CALCIUM SERPL-MCNC: 9.7 MG/DL (ref 8.4–10.2)
CHLORIDE SERPL-SCNC: 103 MMOL/L (ref 96–112)
CO2 SERPL-SCNC: 25 MMOL/L (ref 20–33)
CREAT SERPL-MCNC: 0.65 MG/DL (ref 0.5–1.4)
EKG IMPRESSION: NORMAL
EOSINOPHIL # BLD AUTO: 0.03 K/UL (ref 0–0.51)
EOSINOPHIL NFR BLD: 0.4 % (ref 0–6.9)
ERYTHROCYTE [DISTWIDTH] IN BLOOD BY AUTOMATED COUNT: 43.4 FL (ref 35.9–50)
EST. AVERAGE GLUCOSE BLD GHB EST-MCNC: 117 MG/DL
GLOBULIN SER CALC-MCNC: 2.8 G/DL (ref 1.9–3.5)
GLUCOSE BLD-MCNC: 99 MG/DL (ref 65–99)
GLUCOSE SERPL-MCNC: 102 MG/DL (ref 65–99)
HBA1C MFR BLD: 5.7 % (ref 4–5.6)
HCT VFR BLD AUTO: 40.7 % (ref 37–47)
HGB BLD-MCNC: 13.3 G/DL (ref 12–16)
IMM GRANULOCYTES # BLD AUTO: 0.02 K/UL (ref 0–0.11)
IMM GRANULOCYTES NFR BLD AUTO: 0.3 % (ref 0–0.9)
INR PPP: 1.02 (ref 0.87–1.13)
LV EJECT FRACT  99904: 65
LV EJECT FRACT MOD 2C 99903: 67.48
LV EJECT FRACT MOD 4C 99902: 68.55
LV EJECT FRACT MOD BP 99901: 67.16
LYMPHOCYTES # BLD AUTO: 2.31 K/UL (ref 1–4.8)
LYMPHOCYTES NFR BLD: 33.1 % (ref 22–41)
MCH RBC QN AUTO: 30.5 PG (ref 27–33)
MCHC RBC AUTO-ENTMCNC: 32.7 G/DL (ref 33.6–35)
MCV RBC AUTO: 93.3 FL (ref 81.4–97.8)
MONOCYTES # BLD AUTO: 0.4 K/UL (ref 0–0.85)
MONOCYTES NFR BLD AUTO: 5.7 % (ref 0–13.4)
NEUTROPHILS # BLD AUTO: 4.15 K/UL (ref 2–7.15)
NEUTROPHILS NFR BLD: 59.6 % (ref 44–72)
NRBC # BLD AUTO: 0 K/UL
NRBC BLD-RTO: 0 /100 WBC
PLATELET # BLD AUTO: 245 K/UL (ref 164–446)
PMV BLD AUTO: 11.2 FL (ref 9–12.9)
POTASSIUM SERPL-SCNC: 4.1 MMOL/L (ref 3.6–5.5)
PROT SERPL-MCNC: 7.5 G/DL (ref 6–8.2)
PROTHROMBIN TIME: 13.1 SEC (ref 12–14.6)
RBC # BLD AUTO: 4.36 M/UL (ref 4.2–5.4)
RH BLD: NORMAL
SODIUM SERPL-SCNC: 140 MMOL/L (ref 135–145)
TROPONIN T SERPL-MCNC: <6 NG/L (ref 6–19)
TSH SERPL DL<=0.005 MIU/L-ACNC: 2.21 UIU/ML (ref 0.38–5.33)
WBC # BLD AUTO: 7 K/UL (ref 4.8–10.8)

## 2021-05-21 PROCEDURE — 70498 CT ANGIOGRAPHY NECK: CPT

## 2021-05-21 PROCEDURE — 36415 COLL VENOUS BLD VENIPUNCTURE: CPT

## 2021-05-21 PROCEDURE — 83036 HEMOGLOBIN GLYCOSYLATED A1C: CPT

## 2021-05-21 PROCEDURE — 80053 COMPREHEN METABOLIC PANEL: CPT

## 2021-05-21 PROCEDURE — 84443 ASSAY THYROID STIM HORMONE: CPT

## 2021-05-21 PROCEDURE — G0378 HOSPITAL OBSERVATION PER HR: HCPCS

## 2021-05-21 PROCEDURE — 85610 PROTHROMBIN TIME: CPT

## 2021-05-21 PROCEDURE — G0425 INPT/ED TELECONSULT30: HCPCS | Performed by: PSYCHIATRY & NEUROLOGY

## 2021-05-21 PROCEDURE — 82962 GLUCOSE BLOOD TEST: CPT

## 2021-05-21 PROCEDURE — 700117 HCHG RX CONTRAST REV CODE 255: Performed by: EMERGENCY MEDICINE

## 2021-05-21 PROCEDURE — 85025 COMPLETE CBC W/AUTO DIFF WBC: CPT

## 2021-05-21 PROCEDURE — 71045 X-RAY EXAM CHEST 1 VIEW: CPT

## 2021-05-21 PROCEDURE — 99219 PR INITIAL OBSERVATION CARE,LEVL II: CPT | Performed by: INTERNAL MEDICINE

## 2021-05-21 PROCEDURE — U0005 INFEC AGEN DETEC AMPLI PROBE: HCPCS

## 2021-05-21 PROCEDURE — 93005 ELECTROCARDIOGRAM TRACING: CPT | Performed by: EMERGENCY MEDICINE

## 2021-05-21 PROCEDURE — 86900 BLOOD TYPING SEROLOGIC ABO: CPT

## 2021-05-21 PROCEDURE — 86901 BLOOD TYPING SEROLOGIC RH(D): CPT

## 2021-05-21 PROCEDURE — 70496 CT ANGIOGRAPHY HEAD: CPT

## 2021-05-21 PROCEDURE — 70450 CT HEAD/BRAIN W/O DYE: CPT

## 2021-05-21 PROCEDURE — 93306 TTE W/DOPPLER COMPLETE: CPT | Mod: 26 | Performed by: INTERNAL MEDICINE

## 2021-05-21 PROCEDURE — 99285 EMERGENCY DEPT VISIT HI MDM: CPT

## 2021-05-21 PROCEDURE — 84484 ASSAY OF TROPONIN QUANT: CPT

## 2021-05-21 PROCEDURE — 85730 THROMBOPLASTIN TIME PARTIAL: CPT

## 2021-05-21 PROCEDURE — 86850 RBC ANTIBODY SCREEN: CPT

## 2021-05-21 PROCEDURE — U0003 INFECTIOUS AGENT DETECTION BY NUCLEIC ACID (DNA OR RNA); SEVERE ACUTE RESPIRATORY SYNDROME CORONAVIRUS 2 (SARS-COV-2) (CORONAVIRUS DISEASE [COVID-19]), AMPLIFIED PROBE TECHNIQUE, MAKING USE OF HIGH THROUGHPUT TECHNOLOGIES AS DESCRIBED BY CMS-2020-01-R: HCPCS

## 2021-05-21 PROCEDURE — 93306 TTE W/DOPPLER COMPLETE: CPT

## 2021-05-21 PROCEDURE — 0042T CT-CEREBRAL PERFUSION ANALYSIS: CPT

## 2021-05-21 RX ORDER — HYDROMORPHONE HYDROCHLORIDE 1 MG/ML
0.25 INJECTION, SOLUTION INTRAMUSCULAR; INTRAVENOUS; SUBCUTANEOUS
Status: DISCONTINUED | OUTPATIENT
Start: 2021-05-21 | End: 2021-05-23 | Stop reason: HOSPADM

## 2021-05-21 RX ORDER — CHOLECALCIFEROL (VITAMIN D3) 125 MCG
5 CAPSULE ORAL NIGHTLY
Status: CANCELLED | OUTPATIENT
Start: 2021-05-21

## 2021-05-21 RX ORDER — ONDANSETRON 2 MG/ML
4 INJECTION INTRAMUSCULAR; INTRAVENOUS EVERY 4 HOURS PRN
Status: DISCONTINUED | OUTPATIENT
Start: 2021-05-21 | End: 2021-05-23 | Stop reason: HOSPADM

## 2021-05-21 RX ORDER — PROMETHAZINE HYDROCHLORIDE 25 MG/1
12.5-25 TABLET ORAL EVERY 4 HOURS PRN
Status: DISCONTINUED | OUTPATIENT
Start: 2021-05-21 | End: 2021-05-23 | Stop reason: HOSPADM

## 2021-05-21 RX ORDER — ACETAMINOPHEN 325 MG/1
650 TABLET ORAL EVERY 6 HOURS PRN
Status: DISCONTINUED | OUTPATIENT
Start: 2021-05-21 | End: 2021-05-23 | Stop reason: HOSPADM

## 2021-05-21 RX ORDER — PROMETHAZINE HYDROCHLORIDE 25 MG/1
12.5-25 SUPPOSITORY RECTAL EVERY 4 HOURS PRN
Status: DISCONTINUED | OUTPATIENT
Start: 2021-05-21 | End: 2021-05-23 | Stop reason: HOSPADM

## 2021-05-21 RX ORDER — BISACODYL 10 MG
10 SUPPOSITORY, RECTAL RECTAL
Status: DISCONTINUED | OUTPATIENT
Start: 2021-05-21 | End: 2021-05-23 | Stop reason: HOSPADM

## 2021-05-21 RX ORDER — OXYCODONE HYDROCHLORIDE 5 MG/1
5 TABLET ORAL
Status: DISCONTINUED | OUTPATIENT
Start: 2021-05-21 | End: 2021-05-23 | Stop reason: HOSPADM

## 2021-05-21 RX ORDER — OXYCODONE HYDROCHLORIDE 5 MG/1
2.5 TABLET ORAL
Status: DISCONTINUED | OUTPATIENT
Start: 2021-05-21 | End: 2021-05-23 | Stop reason: HOSPADM

## 2021-05-21 RX ORDER — SODIUM CHLORIDE 1 G/1
1 TABLET ORAL SEE ADMIN INSTRUCTIONS
COMMUNITY
Start: 2021-03-30 | End: 2022-03-02

## 2021-05-21 RX ORDER — AMOXICILLIN 250 MG
2 CAPSULE ORAL 2 TIMES DAILY
Status: DISCONTINUED | OUTPATIENT
Start: 2021-05-21 | End: 2021-05-23 | Stop reason: HOSPADM

## 2021-05-21 RX ORDER — LOSARTAN POTASSIUM 25 MG/1
25 TABLET ORAL DAILY
COMMUNITY
End: 2022-03-02

## 2021-05-21 RX ORDER — ONDANSETRON 4 MG/1
4 TABLET, ORALLY DISINTEGRATING ORAL EVERY 4 HOURS PRN
Status: DISCONTINUED | OUTPATIENT
Start: 2021-05-21 | End: 2021-05-23 | Stop reason: HOSPADM

## 2021-05-21 RX ORDER — ESTRADIOL 0.03 MG/D
1 FILM, EXTENDED RELEASE TRANSDERMAL
COMMUNITY
End: 2022-03-02

## 2021-05-21 RX ORDER — POLYETHYLENE GLYCOL 3350 17 G/17G
1 POWDER, FOR SOLUTION ORAL
Status: DISCONTINUED | OUTPATIENT
Start: 2021-05-21 | End: 2021-05-23 | Stop reason: HOSPADM

## 2021-05-21 RX ORDER — THYROID 30 MG/1
60 TABLET ORAL
Status: DISCONTINUED | OUTPATIENT
Start: 2021-05-21 | End: 2021-05-21

## 2021-05-21 RX ORDER — PROCHLORPERAZINE EDISYLATE 5 MG/ML
5-10 INJECTION INTRAMUSCULAR; INTRAVENOUS EVERY 4 HOURS PRN
Status: DISCONTINUED | OUTPATIENT
Start: 2021-05-21 | End: 2021-05-23 | Stop reason: HOSPADM

## 2021-05-21 RX ADMIN — IOHEXOL 80 ML: 350 INJECTION, SOLUTION INTRAVENOUS at 10:23

## 2021-05-21 RX ADMIN — IOHEXOL 40 ML: 350 INJECTION, SOLUTION INTRAVENOUS at 10:25

## 2021-05-21 ASSESSMENT — LIFESTYLE VARIABLES
ON A TYPICAL DAY WHEN YOU DRINK ALCOHOL HOW MANY DRINKS DO YOU HAVE: 1
EVER FELT BAD OR GUILTY ABOUT YOUR DRINKING: NO
CONSUMPTION TOTAL: NEGATIVE
EVER HAD A DRINK FIRST THING IN THE MORNING TO STEADY YOUR NERVES TO GET RID OF A HANGOVER: NO
TOTAL SCORE: 0
AVERAGE NUMBER OF DAYS PER WEEK YOU HAVE A DRINK CONTAINING ALCOHOL: 1
HAVE PEOPLE ANNOYED YOU BY CRITICIZING YOUR DRINKING: NO
TOTAL SCORE: 0
ALCOHOL_USE: YES
HAVE YOU EVER FELT YOU SHOULD CUT DOWN ON YOUR DRINKING: NO
TOTAL SCORE: 0
HOW MANY TIMES IN THE PAST YEAR HAVE YOU HAD 5 OR MORE DRINKS IN A DAY: 0

## 2021-05-21 ASSESSMENT — COGNITIVE AND FUNCTIONAL STATUS - GENERAL
DAILY ACTIVITIY SCORE: 24
MOBILITY SCORE: 24
SUGGESTED CMS G CODE MODIFIER MOBILITY: CH
SUGGESTED CMS G CODE MODIFIER DAILY ACTIVITY: CH

## 2021-05-21 ASSESSMENT — ENCOUNTER SYMPTOMS
INSOMNIA: 0
SPEECH CHANGE: 0
MYALGIAS: 0
FEVER: 0
HEARTBURN: 0
PHOTOPHOBIA: 0
SHORTNESS OF BREATH: 0
NERVOUS/ANXIOUS: 1
WEAKNESS: 1
ABDOMINAL PAIN: 0
DIZZINESS: 0
COUGH: 0
CONSTIPATION: 0
DIARRHEA: 0
DEPRESSION: 0
SENSORY CHANGE: 0
CHILLS: 0
HEADACHES: 0
VOMITING: 0
BLURRED VISION: 1
CLAUDICATION: 0
SORE THROAT: 0

## 2021-05-21 ASSESSMENT — PATIENT HEALTH QUESTIONNAIRE - PHQ9
2. FEELING DOWN, DEPRESSED, IRRITABLE, OR HOPELESS: NOT AT ALL
1. LITTLE INTEREST OR PLEASURE IN DOING THINGS: NOT AT ALL
SUM OF ALL RESPONSES TO PHQ9 QUESTIONS 1 AND 2: 0

## 2021-05-21 ASSESSMENT — FIBROSIS 4 INDEX
FIB4 SCORE: 1.16
FIB4 SCORE: 1.06
FIB4 SCORE: 1.06

## 2021-05-21 NOTE — ED PROVIDER NOTES
ED Provider Note    CHIEF COMPLAINT  Chief Complaint   Patient presents with   • Facial Droop   • Headache   • Neck Pain       HPI  Sydney Mckeon is a 55 y.o. female who presents to the emergency department with headache, left facial droop, left arm weakness.  Patient has been dealing with extreme fatigue amongst other symptoms for the last month.  She has been followed by her primary provider and has had extensive work-up that has been unrevealing.  She has had nausea, vomiting, weakness, somnolence.  Symptoms gradually worsening.  She was started on estrogen patch about a week ago because of low estrogen levels.  Last night she started having headache in the back of her head and upper neck pain.  She was noted to have a drooping right eyelid although has a history of fairly recent vitreous hemorrhage on the right.  Daughter noticed left face droop around the mouth.  Because of the symptoms did not improve she presents today.  No chest pain, shortness of breath, abdominal pain, diarrhea, vomiting, fevers    REVIEW OF SYSTEMS  As per HPI, otherwise a 10 point review of systems is negative    PAST MEDICAL HISTORY  Past Medical History:   Diagnosis Date   • Herpes genitalia        SOCIAL HISTORY  Social History     Tobacco Use   • Smoking status: Never Smoker   • Smokeless tobacco: Never Used   Vaping Use   • Vaping Use: Never used   Substance Use Topics   • Alcohol use: No   • Drug use: No       SURGICAL HISTORY  Past Surgical History:   Procedure Laterality Date   • HYSTEROSCOPY NOVASURE-2 N/A 8/27/2018    Procedure: HYSTEROSCOPY NOVASURE;  Surgeon: Amira Dubois M.D.;  Location: SURGERY SAME DAY [facility];  Service: Gynecology   • DILATION AND CURETTAGE N/A 8/27/2018    Procedure: DILATION AND CURETTAGE;  Surgeon: Amira Dubois M.D.;  Location: SURGERY SAME DAY [facility];  Service: Gynecology   • APPENDECTOMY      emergent       CURRENT MEDICATIONS  Home Medications     Reviewed by Brea COLE  "Ted Hansen (Pharmacy Tech) on 05/21/21 at 1011  Med List Status: <None>   Medication Last Dose Status   BARBARA THYROID 60 MG Tab  Active   losartan (COZAAR) 25 MG Tab 5/20/2021 Active   MELATONIN PO unknown Active   Multiple Vitamins-Minerals (MULTIVITAMIN ADULT PO) unknown Active   sodium chloride (SALT) 1 GM Tab 5/20/2021 Active   vitamin D (CHOLECALCIFEROL) 1000 UNIT Tab  Active                ALLERGIES  Allergies   Allergen Reactions   • Sulfa Drugs Rash     Rash and shortness of breath   • Vicodin [Hydrocodone-Acetaminophen] Itching     \"bugs crawling on me\"       PHYSICAL EXAM  VITAL SIGNS: /88   Pulse 68   Temp 36 °C (96.8 °F) (Temporal)   Resp 18   Ht 1.727 m (5' 8\")   Wt 54 kg (119 lb 0.8 oz)   SpO2 100%   BMI 18.10 kg/m²    Constitutional: Awake and alert  HENT:   Eyes:   Neck: Grossly normal range of motion.  Cardiovascular: Normal heart rate, Normal rhythm.  Symmetric peripheral pulses.   Thorax & Lungs: No respiratory distress, No wheezing, No rales, No rhonchi, No chest tenderness.   Abdomen: Bowel sounds normal, soft, non-distended, nontender, no mass  Skin: No obvious rash.  Back: No tenderness, No CVA tenderness.   Extremities: No clubbing, cyanosis, edema, no Homans or cords.  Neurologic: Mild left facial droop primarily flattening of the left nasolabial fold and downward turning of the left mouth.  Mild droop of the right eyelid.  No drift in either extremities, but slight weakness of the , bicep and tricep on the left versus the right.  No weakness in the lower extremity.  Normal sensory throughout.  No ataxia with finger-to-nose.  NIH 1  Psychiatric: Normal for situation    RADIOLOGY/PROCEDURES  CT-CEREBRAL PERFUSION ANALYSIS   Final Result      1.  Cerebral blood flow less than 30% likely representing completed infarct = 0 mL.      2.  T Max more than 6 seconds likely representing combination of completed infarct and ischemia = 0 mL.      3.  Mismatched volume likely " representing ischemic brain/penumbra = None      4.  Please note that the cerebral perfusion was performed on the limited brain tissue around the basal ganglia region. Infarct/ischemia outside the CT perfusion sections can be missed in this study.      CT-CTA NECK WITH & W/O-POST PROCESSING   Final Result      CT angiogram of the neck within normal limits.      CT-CTA HEAD WITH & W/O-POST PROCESS   Final Result      1.  CT angiogram of the [place] within normal limits.      2.  Anatomic variant carotid origin of right posterior cerebral artery and anatomic variant termination of right vertebral artery into posterior inferior cerebellar artery      DX-CHEST-PORTABLE (1 VIEW)   Final Result      No cardiopulmonary abnormality identified.      CT-HEAD W/O   Final Result      Negative noncontrast CT scan of the head / brain.      MR-BRAIN-WITH & W/O    (Results Pending)   MR-ORBITS,FACE,NECK-WITH&W/O & SEQUENCES    (Results Pending)        Imaging is interpreted by radiologist    Labs:  Results for orders placed or performed during the hospital encounter of 05/21/21   CBC WITH DIFFERENTIAL   Result Value Ref Range    WBC 7.0 4.8 - 10.8 K/uL    RBC 4.36 4.20 - 5.40 M/uL    Hemoglobin 13.3 12.0 - 16.0 g/dL    Hematocrit 40.7 37.0 - 47.0 %    MCV 93.3 81.4 - 97.8 fL    MCH 30.5 27.0 - 33.0 pg    MCHC 32.7 (L) 33.6 - 35.0 g/dL    RDW 43.4 35.9 - 50.0 fL    Platelet Count 245 164 - 446 K/uL    MPV 11.2 9.0 - 12.9 fL    Neutrophils-Polys 59.60 44.00 - 72.00 %    Lymphocytes 33.10 22.00 - 41.00 %    Monocytes 5.70 0.00 - 13.40 %    Eosinophils 0.40 0.00 - 6.90 %    Basophils 0.90 0.00 - 1.80 %    Immature Granulocytes 0.30 0.00 - 0.90 %    Nucleated RBC 0.00 /100 WBC    Neutrophils (Absolute) 4.15 2.00 - 7.15 K/uL    Lymphs (Absolute) 2.31 1.00 - 4.80 K/uL    Monos (Absolute) 0.40 0.00 - 0.85 K/uL    Eos (Absolute) 0.03 0.00 - 0.51 K/uL    Baso (Absolute) 0.06 0.00 - 0.12 K/uL    Immature Granulocytes (abs) 0.02 0.00  - 0.11 K/uL    NRBC (Absolute) 0.00 K/uL   COMP METABOLIC PANEL   Result Value Ref Range    Sodium 140 135 - 145 mmol/L    Potassium 4.1 3.6 - 5.5 mmol/L    Chloride 103 96 - 112 mmol/L    Co2 25 20 - 33 mmol/L    Anion Gap 12.0 7.0 - 16.0    Glucose 102 (H) 65 - 99 mg/dL    Bun 8 8 - 22 mg/dL    Creatinine 0.65 0.50 - 1.40 mg/dL    Calcium 9.7 8.4 - 10.2 mg/dL    AST(SGOT) 21 12 - 45 U/L    ALT(SGPT) 19 2 - 50 U/L    Alkaline Phosphatase 63 30 - 99 U/L    Total Bilirubin 1.5 0.1 - 1.5 mg/dL    Albumin 4.7 3.2 - 4.9 g/dL    Total Protein 7.5 6.0 - 8.2 g/dL    Globulin 2.8 1.9 - 3.5 g/dL    A-G Ratio 1.7 g/dL   PROTHROMBIN TIME   Result Value Ref Range    PT 13.1 12.0 - 14.6 sec    INR 1.02 0.87 - 1.13   APTT   Result Value Ref Range    APTT 33.8 24.7 - 36.0 sec   COD (ADULT)   Result Value Ref Range    ABO Grouping Only A     Rh Grouping Only NEG     Antibody Screen-Cod NEG    TROPONIN   Result Value Ref Range    Troponin T <6 6 - 19 ng/L   ESTIMATED GFR   Result Value Ref Range    GFR If African American >60 >60 mL/min/1.73 m 2    GFR If Non African American >60 >60 mL/min/1.73 m 2   EKG (NOW)   Result Value Ref Range    Report       [facility]    Test Date:  2021  Pt Name:    ANABELLA MORRIS               Department: [facility]  MRN:        [id number]                      Room:       -ROOM 3  Gender:     Female                       Technician: [id number]  :        [id number]                   Requested By:JULI VARGHESE  Order #:    [id number]                    Reading MD: JULI VARGHESE MD    Measurements  Intervals                                Axis  Rate:       86                           P:          85  OK:         152                          QRS:        66  QRSD:       80                           T:          55  QT:         392  QTc:        469    Interpretive Statements  SINUS RHYTHM  PROBABLE LEFT ATRIAL ABNORMALITY  CONSIDER ANTEROSEPTAL INFARCT  Compared to ECG  12/30/2019 12:08:18  Myocardial infarct finding now present  Electronically Signed On 5- 11:23:11 PDT by JULI VARGHESE MD     POCT glucose device results   Result Value Ref Range    Glucose - Accu-Ck 99 65 - 99 mg/dL         COURSE & MEDICAL DECISION MAKING  Patient presents with left arm weakness which is mild.  Left face droop mostly over the left side of the mouth.  Ptosis on the right.  Presents out of time window for alteplase.  Obtain imaging to evaluate for large vessel occlusion or other pathology.    Data returned as above.  Discussed with Dr. Babcock.  Patient seen by neurology remotely.  Patient will be admitted to the hospital for further work-up.  Patient stable at this time.    Discussed case with Dr. Fang.    FINAL IMPRESSION  1.  Left-sided facial droop  2.  Left arm weakness      This dictation was created using voice recognition software. The accuracy of the dictation is limited to the abilities of the software.  The nursing notes were reviewed and certain aspects of this information were incorporated into this note.      Electronically signed by: Juli Varghese M.D., 5/21/2021 10:14 AM

## 2021-05-21 NOTE — THERAPY
"Missed Therapy     Patient Name: Sydney Mckeon  Age:  55 y.o., Sex:  female  Medical Record #: [id number]  Today's Date: 5/21/2021    Discussed missed therapy with RN       05/21/21 [id number]   Treatment Variance   Reason For Missed Therapy Medical - Patient not Able To Participate;Medical - Patient  in Procedure   Total Time Spent   Total Time Spent (Mins) 5   Initial Contact Note    Initial Contact Note  Order Received and Verified, Speech Therapy Evaluation in Progress with Full Report to Follow.   Interdisciplinary Plan of Care Collaboration   IDT Collaboration with  Nursing   Collaboration Comments Attempted to see Pt for clinical swallow evaluation; however, Pt is currently having \"bubble study\" done with technician. Will hold eval at this time and complete as able/appropriate. Per RN, Pt has not had dysphagia screening yet. SLP available as needed over the weekend. Thank you.     "

## 2021-05-21 NOTE — CONSULTS
"Tele-Stroke Consultation Note:     Hospital: [facility]  Referring Physician: Keven Flores M.D.    Reason for consultation: Stroke like symptoms    Last Known Well: 5/20/21    TPA Decision: No TPA due to LKW > 4.5 hours    HPI: Sydney Mckeon is a 55 y.o. female with history of HSV2, hair loss, impaired glucose tolerance presenting to the hospital for R facial droop and consulted for stroke. Patient has been undergoing workup for multiple complaints for the last 6 months. She has had unexplained fatigue, hyponatremia which has been significant (as low as 118), and hair loss. She presents today because at around 9pm on 5/20/21 she had R eye drooping per her daughter. She also endorses tingling of her fingers bilaterally but does not endorse significant weakness otherwise. She has no visual symptoms, but endorses a headache.     ROS:     As above. All other systems reviewed and are negative.    Past Medical History:    has a past medical history of Herpes genitalia.    FHx:  family history includes Cancer in her father; Cancer (age of onset: 50) in her mother; Dementia in her mother; Diabetes in her father and maternal grandmother; Heart Attack in her father; No Known Problems in her daughter; Other in her father; Thyroid in her father.    SHx:   reports that she has never smoked. She has never used smokeless tobacco. She reports that she does not drink alcohol and does not use drugs.    Allergies:  Allergies   Allergen Reactions   • Sulfa Drugs Rash     Rash and shortness of breath   • Vicodin [Hydrocodone-Acetaminophen] Itching     \"bugs crawling on me\"       Medications:  No current facility-administered medications for this encounter.    Current Outpatient Medications:   •  losartan (COZAAR) 25 MG Tab, Take 25 mg by mouth every day., Disp: , Rfl:   •  estradiol (VIVELLE DOT) 0.05 MG/24HR PATCH BIWEEKLY, Place 1 Patch on the skin two times a week. Sandoz Brand (generic), Disp: , " "Rfl:   •  sodium chloride (SALT) 1 GM Tab, Take 1 g by mouth see administration instructions. BID to TID, Disp: , Rfl:   •  ARMOUR THYROID 60 MG Tab, Take 60 mg by mouth every morning before breakfast., Disp: , Rfl:   •  MELATONIN PO, Take 1 tablet by mouth at bedtime as needed., Disp: , Rfl:   •  Multiple Vitamins-Minerals (MULTIVITAMIN ADULT PO), Take 1 tablet by mouth every day., Disp: , Rfl:   •  vitamin D (CHOLECALCIFEROL) 1000 UNIT Tab, Take 1,000 Units by mouth every day., Disp: , Rfl:     Outpatient Medications:   (Not in a hospital admission)      Vitals:   Vitals:    05/21/21 0929 05/21/21 0931   BP:  149/88   Pulse:  68   Resp:  18   Temp:  36 °C (96.8 °F)   TempSrc:  Temporal   SpO2:  100%   Weight: 54 kg (119 lb 0.8 oz)    Height: 1.727 m (5' 8\")        Labs:     Lab Results   Component Value Date/Time    WBC 7.0 05/21/2021 10:00 AM    RBC 4.36 05/21/2021 10:00 AM    HEMOGLOBIN 13.3 05/21/2021 10:00 AM    HEMATOCRIT 40.7 05/21/2021 10:00 AM    MCV 93.3 05/21/2021 10:00 AM    MCH 30.5 05/21/2021 10:00 AM    MCHC 32.7 (L) 05/21/2021 10:00 AM    MPV 11.2 05/21/2021 10:00 AM    NEUTSPOLYS 59.60 05/21/2021 10:00 AM    LYMPHOCYTES 33.10 05/21/2021 10:00 AM    MONOCYTES 5.70 05/21/2021 10:00 AM    EOSINOPHILS 0.40 05/21/2021 10:00 AM    BASOPHILS 0.90 05/21/2021 10:00 AM      Lab Results   Component Value Date/Time    SODIUM 132 (L) 04/19/2021 08:04 AM    POTASSIUM 4.1 04/19/2021 08:04 AM    CHLORIDE 96 04/19/2021 08:04 AM    CO2 25 04/19/2021 08:04 AM    GLUCOSE 87 04/19/2021 08:04 AM    BUN 8 04/19/2021 08:04 AM    CREATININE 0.58 04/19/2021 08:04 AM      Lab Results   Component Value Date/Time    CHOLSTRLTOT 240 (H) 04/19/2021 08:04 AM     (H) 04/19/2021 08:04 AM    HDL 99 04/19/2021 08:04 AM    TRIGLYCERIDE 81 04/19/2021 08:04 AM       Lab Results   Component Value Date/Time    ALKPHOSPHAT 59 04/19/2021 08:04 AM    ASTSGOT 18 04/19/2021 08:04 AM    ALTSGPT 14 04/19/2021 08:04 AM    TBILIRUBIN 1.8 " (H) 04/19/2021 08:04 AM        Imaging:  CT Head W/O CST personally reviewed w/o evidence of acute infarction or hemorrhage.     CTA Head/Neck reviewed in chart.     CTP reviewed in chart.    Physical Exam:     General: 54 y/o female in bed in NAD  Cardio: Unable to assess through telemedicine.   Pulm: Unable to assess through telemedicine.   Skin: Warm, dry, no rashes or lesions   Psychiatric: Appropriate affect. No active psychosis.  HEENT: Unable to assess through telemedicine.   Abdomen: Unable to assess through telemedicine.     Physical Exam:    NIH Stroke Scale:    1a. Level of Consciousness (Alert, drowsy, etc): 0= Alert    1b. LOC Questions (Month, age): 0= Answers both correctly    1c. LOC Commands (Open/close eyes make fist/let go): 0= Obeys both correctly    2.   Best Gaze (Eyes open - patient follows examiner's finger on face): 0= Normal    3.   Visual Fields (introduce visual stimulus/threat to patient's field quadrants): 0= No visual loss  4.   Facial Paresis (Show teeth, raise eyebrows and squeeze eyes shut): 1= Minor     5a. Motor Arm - Left (Elevate arm to 90 degrees if patient is sitting, 45 degrees if  supine): 0= No drift    5b. Motor Arm - Right (Elevate arm to 90 degrees if patient is sitting, 45 degrees if supine): 0= No drift    6a. Motor Leg - Left (Elevate leg 30 degrees with patient supine): 0= No drift    6b. Motor Leg - Right  (Elevate leg 30 degrees with patient supine): 0= No drift    7.   Limb Ataxia (Finger-nose, heel down shin): 0= No ataxia    8.   Sensory (Pin prick to face, arm, trunk and leg - compare side to side): 0= Normal    9.  Best Language (Name item, describe a picture and read sentences): 0= No aphasia    10. Dysarthria (Evaluate speech clarity by patient repeating listed words): 0= Normal articulation    11. Extinction and Inattention (Use information from prior testing to identify neglect or  double simultaneous stimuli testing): 0= No neglect    Total NIH Score:  1    Assessment/Plan:    Sydney Mckeon is a 55 y.o. female with history of HSV2, hair loss, impaired glucose tolerance presenting to the hospital for R facial droop and consulted for stroke. Patient's symptoms are atypical for stroke. The only objective finding which according to her is new is ptosis on the R eye. This is not typically caused by ischemia in the absence of other symptoms (upper face has dual innervation). Would recommend further evaluation with MRI imaging. Evaluation of her other medical issues per ER and internal medicine.     Plan:  -MRI Brain W/WO CST  -MRI Orbit  -Further recommendations pending above studies.  -Plan discussed with consulting physician and patient's nurse      This consultation was conducted utilizing secure and encrypted videoconferencing equipment with the assistance of a trained tele-presenter at the originating site.      Stevie Hudson M.D., Diplomat of the American Board of Psychiatry and Neurology  Diplomat of ABP[employer] Epilepsy Subspecialty   Assistant Clinical Professor, [facility] Neurology Consultant

## 2021-05-21 NOTE — ASSESSMENT & PLAN NOTE
6 month history, PCP is in process of this work up.  Was taking armour thyroid through her naturopath, PCP instructed her to stop and fatigue worsened  She stopped taking this 3 weeks ago  Repeat TSH

## 2021-05-21 NOTE — ED TRIAGE NOTES
"Pt presents complaining of recurrence of fatigue, neck pain, right facial numbness with drooping (not detected in triage), and severe headache escalating since yesterday.  Chief Complaint   Patient presents with   • Facial Droop   • Headache   • Neck Pain     /88   Pulse 68   Temp 36 °C (96.8 °F) (Temporal)   Resp 18   Ht 1.727 m (5' 8\")   Wt 54 kg (119 lb 0.8 oz)   SpO2 100%   BMI 18.10 kg/m²      "

## 2021-05-21 NOTE — ASSESSMENT & PLAN NOTE
Suspect this is related to significant fatigue and self limited  Neurology consulted, Dr Hudson recommending MRI brain with and without, and MRI orbits for further evaluation  Will continue CVA w/u with echo and tele monitoring and permissive HTN but will hold on asa and statin at this time since is very low suspicion of ischemia per neurology.

## 2021-05-21 NOTE — H&P
Hospital Medicine History & Physical Note    Date of Service  5/21/2021    Primary Care Physician  DOMINIC Elliott.    Consultants  Stevie Hudson - neurology    Code Status  Full Code    Chief Complaint  Chief Complaint   Patient presents with   • Facial Droop   • Headache   • Neck Pain       History of Presenting Illness  55 y.o. female who presented 5/21/2021 with right eye ptosis that started last night.  She had a 12 hour hormone testing with sputum collection and was abstaining from caffeine and food during this time. Usually she drinks 2-3 pots of coffee per day because of the extreme fatigue she has had for the past 6 months.  She also has a history of uterine ablation for heavy periods about 1 year ago.  She recently followed up with her gynecologist and was started on estrogen patch about 2 weeks ago but hasn't felt any changes since then.  Her main complaints are of severe fatigue, hair loss, weight gain of 10 lbs without dietary changes in a 6 month period.  She went to a naturopath who prescribed armour thyroid and her PCP recently instructed her not to take this so she stopped it about 3 weeks ago.  She will be admitted for a neurologic workup to make sure stroke is not present but following neurology evaluation in the ED, likelihood of ischemia is very low.  MRI brain and orbits pending, echo, tele monitoring and TSH with reflex to T4 to be done.  If these studies are normal, then patient likely to discharge home and follow up with her PCP and gynecologist for continued workup.  Patient and  agreeable with this plan.      Review of Systems  Review of Systems   Constitutional: Positive for malaise/fatigue. Negative for chills and fever.        Weight gain     HENT: Negative for congestion and sore throat.    Eyes: Positive for blurred vision (bilateral vitreal detachment 6 months ago.). Negative for photophobia.   Respiratory: Negative for cough and shortness of breath.   "  Cardiovascular: Negative for chest pain, claudication and leg swelling.   Gastrointestinal: Negative for abdominal pain, constipation, diarrhea, heartburn and vomiting.   Genitourinary: Negative for dysuria and hematuria.   Musculoskeletal: Negative for joint pain and myalgias.   Skin: Negative for itching and rash.   Neurological: Positive for weakness (6 months). Negative for dizziness, sensory change, speech change and headaches.   Psychiatric/Behavioral: Negative for depression. The patient is nervous/anxious. The patient does not have insomnia.        Past Medical History   has a past medical history of Herpes genitalia.    Surgical History   has a past surgical history that includes appendectomy; hysteroscopy novasure-2 (N/A, 8/27/2018); and dilation and curettage (N/A, 8/27/2018).     Family History  family history includes Cancer in her father; Cancer (age of onset: 50) in her mother; Dementia in her mother; Diabetes in her father and maternal grandmother; Heart Attack in her father; No Known Problems in her daughter; Other in her father; Thyroid in her father.     Social History   reports that she has never smoked. She has never used smokeless tobacco. She reports that she does not drink alcohol and does not use drugs.    Allergies  Allergies   Allergen Reactions   • Sulfa Drugs Rash     Rash and shortness of breath   • Vicodin [Hydrocodone-Acetaminophen] Itching     \"bugs crawling on me\"       Medications  Prior to Admission Medications   Prescriptions Last Dose Informant Patient Reported? Taking?   ARMOUR THYROID 60 MG Tab  Family Member Yes No   Sig: Take 60 mg by mouth every morning before breakfast.   MELATONIN PO unknown at pm Family Member Yes No   Sig: Take 1 tablet by mouth at bedtime as needed.   Multiple Vitamins-Minerals (MULTIVITAMIN ADULT PO) unknown at am Family Member Yes No   Sig: Take 1 tablet by mouth every day.   estradiol (VIVELLE DOT) 0.05 MG/24HR PATCH BIWEEKLY 5/20/2021 at on pm " Patient's Home Pharmacy Yes Yes   Sig: Place 1 Patch on the skin two times a week. Sandoz Brand (generic)   losartan (COZAAR) 25 MG Tab 5/20/2021 at am Family Member Yes Yes   Sig: Take 25 mg by mouth every day.   sodium chloride (SALT) 1 GM Tab 5/20/2021 at am Family Member Yes No   Sig: Take 1 g by mouth see administration instructions. BID to TID   vitamin D (CHOLECALCIFEROL) 1000 UNIT Tab  Family Member Yes No   Sig: Take 1,000 Units by mouth every day.      Facility-Administered Medications: None       Physical Exam  Temp:  [36 °C (96.8 °F)] 36 °C (96.8 °F)  Pulse:  [62-68] 64  Resp:  [18-21] 19  BP: ()/(55-88) 96/56  SpO2:  [96 %-100 %] 99 %    Physical Exam    Laboratory:  Recent Labs     05/21/21  1000   WBC 7.0   RBC 4.36   HEMOGLOBIN 13.3   HEMATOCRIT 40.7   MCV 93.3   MCH 30.5   MCHC 32.7*   RDW 43.4   PLATELETCT 245   MPV 11.2     Recent Labs     05/21/21  1000   SODIUM 140   POTASSIUM 4.1   CHLORIDE 103   CO2 25   GLUCOSE 102*   BUN 8   CREATININE 0.65   CALCIUM 9.7     Recent Labs     05/21/21  1000   ALTSGPT 19   ASTSGOT 21   ALKPHOSPHAT 63   TBILIRUBIN 1.5   GLUCOSE 102*     Recent Labs     05/21/21  1034   APTT 33.8   INR 1.02     No results for input(s): NTPROBNP in the last 72 hours.      Recent Labs     05/21/21  1000   TROPONINT <6       Imaging:  CT-CEREBRAL PERFUSION ANALYSIS   Final Result      1.  Cerebral blood flow less than 30% likely representing completed infarct = 0 mL.      2.  T Max more than 6 seconds likely representing combination of completed infarct and ischemia = 0 mL.      3.  Mismatched volume likely representing ischemic brain/penumbra = None      4.  Please note that the cerebral perfusion was performed on the limited brain tissue around the basal ganglia region. Infarct/ischemia outside the CT perfusion sections can be missed in this study.      CT-CTA NECK WITH & W/O-POST PROCESSING   Final Result      CT angiogram of the neck within normal limits.      CT-CTA HEAD  WITH & W/O-POST PROCESS   Final Result      1.  CT angiogram of the [place] within normal limits.      2.  Anatomic variant carotid origin of right posterior cerebral artery and anatomic variant termination of right vertebral artery into posterior inferior cerebellar artery      DX-CHEST-PORTABLE (1 VIEW)   Final Result      No cardiopulmonary abnormality identified.      CT-HEAD W/O   Final Result      Negative noncontrast CT scan of the head / brain.      MR-BRAIN-WITH & W/O    (Results Pending)   MR-ORBITS,FACE,NECK-WITH&W/O & SEQUENCES    (Results Pending)   EC-ECHOCARDIOGRAM COMPLETE W/O CONT    (Results Pending)         Assessment/Plan:  I anticipate this patient is appropriate for observation status at this time.    * Acquired mechanical ptosis of right eyelid  Assessment & Plan  Suspect this is related to significant fatigue and self limited  Neurology consulted, Dr Hudson recommending MRI brain with and without, and MRI orbits for further evaluation  Will continue CVA w/u with echo and tele monitoring and permissive HTN but will hold on asa and statin at this time since is very low suspicion of ischemia per neurology.      Malaise  Assessment & Plan  6 month history, PCP is in process of this work up.  Was taking armour thyroid through her naturopath, PCP instructed her to stop and fatigue worsened  She stopped taking this 3 weeks ago  Repeat TSH      History of endometrial ablation  Assessment & Plan  1 year ago had ablation so no longer menstruates since then      Weight gain  Assessment & Plan  10 pounds in the past 6 months  Patient states she hasn't changed her diet during this period      Genital herpes simplex- (present on admission)  Assessment & Plan  No acute exacerbation at this time.

## 2021-05-22 LAB
ALBUMIN SERPL BCP-MCNC: 3.9 G/DL (ref 3.2–4.9)
ALBUMIN/GLOB SERPL: 1.6 G/DL
ALP SERPL-CCNC: 54 U/L (ref 30–99)
ALT SERPL-CCNC: 18 U/L (ref 2–50)
ANION GAP SERPL CALC-SCNC: 13 MMOL/L (ref 7–16)
AST SERPL-CCNC: 22 U/L (ref 12–45)
BILIRUB SERPL-MCNC: 1.9 MG/DL (ref 0.1–1.5)
BUN SERPL-MCNC: 10 MG/DL (ref 8–22)
CALCIUM SERPL-MCNC: 9 MG/DL (ref 8.4–10.2)
CHLORIDE SERPL-SCNC: 103 MMOL/L (ref 96–112)
CHOLEST SERPL-MCNC: 196 MG/DL (ref 100–199)
CO2 SERPL-SCNC: 22 MMOL/L (ref 20–33)
CREAT SERPL-MCNC: 0.59 MG/DL (ref 0.5–1.4)
ERYTHROCYTE [DISTWIDTH] IN BLOOD BY AUTOMATED COUNT: 44 FL (ref 35.9–50)
GLOBULIN SER CALC-MCNC: 2.5 G/DL (ref 1.9–3.5)
GLUCOSE SERPL-MCNC: 83 MG/DL (ref 65–99)
HCT VFR BLD AUTO: 38.7 % (ref 37–47)
HDLC SERPL-MCNC: 82 MG/DL
HGB BLD-MCNC: 12.3 G/DL (ref 12–16)
LDLC SERPL CALC-MCNC: 102 MG/DL
MCH RBC QN AUTO: 29.7 PG (ref 27–33)
MCHC RBC AUTO-ENTMCNC: 31.8 G/DL (ref 33.6–35)
MCV RBC AUTO: 93.5 FL (ref 81.4–97.8)
PLATELET # BLD AUTO: 217 K/UL (ref 164–446)
PMV BLD AUTO: 11.5 FL (ref 9–12.9)
POTASSIUM SERPL-SCNC: 4 MMOL/L (ref 3.6–5.5)
PROT SERPL-MCNC: 6.4 G/DL (ref 6–8.2)
RBC # BLD AUTO: 4.14 M/UL (ref 4.2–5.4)
SARS-COV-2 RNA RESP QL NAA+PROBE: NOTDETECTED
SODIUM SERPL-SCNC: 138 MMOL/L (ref 135–145)
SPECIMEN SOURCE: NORMAL
TRIGL SERPL-MCNC: 60 MG/DL (ref 0–149)
WBC # BLD AUTO: 7.7 K/UL (ref 4.8–10.8)

## 2021-05-22 PROCEDURE — 99225 PR SUBSEQUENT OBSERVATION CARE,LEVEL II: CPT | Performed by: HOSPITALIST

## 2021-05-22 PROCEDURE — G0378 HOSPITAL OBSERVATION PER HR: HCPCS

## 2021-05-22 PROCEDURE — 80061 LIPID PANEL: CPT

## 2021-05-22 PROCEDURE — 80053 COMPREHEN METABOLIC PANEL: CPT

## 2021-05-22 PROCEDURE — 85027 COMPLETE CBC AUTOMATED: CPT

## 2021-05-22 RX ORDER — CHOLECALCIFEROL (VITAMIN D3) 125 MCG
5 CAPSULE ORAL NIGHTLY PRN
Status: DISCONTINUED | OUTPATIENT
Start: 2021-05-22 | End: 2021-05-23 | Stop reason: HOSPADM

## 2021-05-22 ASSESSMENT — PATIENT HEALTH QUESTIONNAIRE - PHQ9
2. FEELING DOWN, DEPRESSED, IRRITABLE, OR HOPELESS: NOT AT ALL
SUM OF ALL RESPONSES TO PHQ9 QUESTIONS 1 AND 2: 0
1. LITTLE INTEREST OR PLEASURE IN DOING THINGS: NOT AT ALL

## 2021-05-22 ASSESSMENT — FIBROSIS 4 INDEX: FIB4 SCORE: 1.29

## 2021-05-22 NOTE — PROGRESS NOTES
Report received. Assumed care of patient. Patient is resting in bed. Patient states she has been having severe fatigue, hair and weight loss, and is inquiring about full thyroid function panel, will speak with MD. All needs are currently met. All safety precautions in place. Will continue to monitor.

## 2021-05-22 NOTE — PROGRESS NOTES
Telemetry Shift Summary     Rhythm SB, SR  HR Range 57-88 touched 47  Ectopy  None  Measurements .20/.08/.44              Normal Values  Rhythm SR  HR Range    Measurements 0.12-0.20 / 0.06-0.10  / 0.30-0.52

## 2021-05-22 NOTE — THERAPY
Missed Therapy     Patient Name: Sydney Mckeon  Age:  55 y.o., Sex:  female  Medical Record #: [id number]  Today's Date: 5/22/2021 05/22/21 1000   Treatment Variance   Reason For Missed Therapy Non-Medical - Other (Please Comment)  (Per RN, SLP not indicated)   Interdisciplinary Plan of Care Collaboration   IDT Collaboration with  Nursing   Collaboration Comments Clinical swallow and cognitive-linguistic evaluation orders appreciated. Per RN, pt is at her baseline, and these evaluations are not indicated at this time. RN to contact MD re: cancelling orders. Please re-consult should SLP team be of help. Thanks!

## 2021-05-22 NOTE — PROGRESS NOTES
[facility] Medicine Daily Progress Note    Date of Service  5/22/2021    Chief Complaint  55 y.o. female admitted 5/21/2021 with ptosis of right eye    Hospital Course  No notes on file    Interval Problem Update  No acute overnight events, patient reports ongoing hot flashes, cold sweats, fatigue, malaise.    Consultants/Specialty  None at this juncture.    Code Status  Full Code    Disposition  Anticipate that patient will be stable for return to home tomorrow morning following completion of a negative brain MRI this evening.    Review of Systems  All systems reviewed and negative except as noted per above.    Physical Exam  Temp:  [36.6 °C (97.8 °F)-37.1 °C (98.7 °F)] 37.1 °C (98.7 °F)  Pulse:  [63-73] 64  Resp:  [16-18] 18  BP: (102-132)/(53-74) 115/65  SpO2:  [95 %-100 %] 95 %    General: No acute distress  HEENT atraumatic, normocephalic, mild ptosis of the right eye noted  Neck: No JVD  Chest: Respirations are unlabored  Cardiac: Physiologic S1 and S2  Abdomen: Soft, nontender, nondistended  Extremities: Without clubbing, cyanosis or edema  Neuro: Cranial nerves II through XII are grossly intact.  Psych: No anxiety, judgement intact.          Fluids    Intake/Output Summary (Last 24 hours) at 5/22/2021 1545  Last data filed at 5/22/2021 0900  Gross per 24 hour   Intake 240 ml   Output --   Net 240 ml       Laboratory  Recent Labs     05/21/21  1000 05/22/21  0202   WBC 7.0 7.7   RBC 4.36 4.14*   HEMOGLOBIN 13.3 12.3   HEMATOCRIT 40.7 38.7   MCV 93.3 93.5   MCH 30.5 29.7   MCHC 32.7* 31.8*   RDW 43.4 44.0   PLATELETCT 245 217   MPV 11.2 11.5     Recent Labs     05/21/21  1000 05/22/21  0202   SODIUM 140 138   POTASSIUM 4.1 4.0   CHLORIDE 103 103   CO2 25 22   GLUCOSE 102* 83   BUN 8 10   CREATININE 0.65 0.59   CALCIUM 9.7 9.0     Recent Labs     05/21/21  1034   APTT 33.8   INR 1.02         Recent Labs     05/22/21  0202   TRIGLYCERIDE 60   HDL 82   *       Imaging  EC-ECHOCARDIOGRAM COMPLETE W/O CONT    Final Result      CT-CEREBRAL PERFUSION ANALYSIS   Final Result      1.  Cerebral blood flow less than 30% likely representing completed infarct = 0 mL.      2.  T Max more than 6 seconds likely representing combination of completed infarct and ischemia = 0 mL.      3.  Mismatched volume likely representing ischemic brain/penumbra = None      4.  Please note that the cerebral perfusion was performed on the limited brain tissue around the basal ganglia region. Infarct/ischemia outside the CT perfusion sections can be missed in this study.      CT-CTA NECK WITH & W/O-POST PROCESSING   Final Result      CT angiogram of the neck within normal limits.      CT-CTA HEAD WITH & W/O-POST PROCESS   Final Result      1.  CT angiogram of the [place] within normal limits.      2.  Anatomic variant carotid origin of right posterior cerebral artery and anatomic variant termination of right vertebral artery into posterior inferior cerebellar artery      DX-CHEST-PORTABLE (1 VIEW)   Final Result      No cardiopulmonary abnormality identified.      CT-HEAD W/O   Final Result      Negative noncontrast CT scan of the head / brain.      MR-BRAIN-WITH & W/O    (Results Pending)   MR-ORBITS,FACE,NECK-WITH&W/O & SEQUENCES    (Results Pending)        Assessment/Plan  * Acquired mechanical ptosis of right eyelid  Assessment & Plan  Suspect this is related to significant fatigue and self limited  Neurology consulted, Dr Hudson recommending MRI brain with and without, and MRI orbits for further evaluation  Will continue CVA w/u with echo and tele monitoring and permissive HTN but will hold on asa and statin at this time since is very low suspicion of ischemia per neurology.      Malaise  Assessment & Plan  6 month history, PCP is in process of this work up.  Was taking armour thyroid through her naturopath, PCP instructed her to stop and fatigue worsened  She stopped taking this 3 weeks ago  Repeat TSH      History of endometrial  ablation  Assessment & Plan  1 year ago had ablation so no longer menstruates since then      Weight gain  Assessment & Plan  10 pounds in the past 6 months  Patient states she hasn't changed her diet during this period      Genital herpes simplex- (present on admission)  Assessment & Plan  No acute exacerbation at this time.           VTE prophylaxis: LMWH

## 2021-05-22 NOTE — DIETARY
"Nutrition services: Day 0 of admit.  Sydney Mckeon is a 55 y.o. female with admitting DX of Acquired ptosis of right eyelid     Consult received for BMI <19    Visit with pt at bedside. Pt reportes good appetite/PO intake, 3 meals/day. Pt states \"I eat all day.\" Pt agreeable to Boost supplement with meals. Pt reports usual weight about 120 lb, thinks she was 115 lb on admit. Confirmed height of 5'8\". Pt prefers vegetarian foods, kitchen notified. Pt concerned about hair falling out. Discussed possibility of hair loss with poor PO intake/nutrient deficiency, pt denied poor PO intake. Pt said she would have her  bring in food if she doesn't like food here.    Assessment:  Height: 172.7 cm (5' 8\")  Weight: 54.7 kg (120 lb 9.5 oz)  Body mass index is 18.34 kg/m²., BMI classification: Underweight  Diet/Intake: Regular, 50-75% breakfast this morning + % snack last night.     Evaluation:   1. Weight trend: weight stable per chart review  · 3/16/20 120 lb  · 3/17/21 117 lb  2. Labs: High Total Cholesterol (240 on 4/19/21, currently 196), elevated HDL cholesterol.  3. Meds: Reviewed  4. Per nutrition focused physical exam: pt appeared adequately nourished  5. Some concern for an eating disorder with hair loss, low BMI, low blood pressure. Concerns shared with MD.     Malnutrition Risk: does not meet criteria at this time    Recommendations/Plan:  1. Boost Plus TID with meals   2. Consider Pshyc evaluation to assess eating disorder risk   3. Encourage intake of meals  4. Document intake of all meals as % taken in ADL's to provide interdisciplinary communication across all shifts.   5. Monitor weight.  6. Nutrition rep will continue to see patient for ongoing meal and snack preferences.     RD following        "

## 2021-05-22 NOTE — DISCHARGE PLANNING
[facility] Transitional Care Coordination    Referral from:  Dr. Fang   Insurance Provider on Facesheet: [facility]   Potential Rehab Diagnosis: Stroke protocol        D/C support: Spouse Keven      Physiatry consultation pending per protocol.     Last Covid test:    Results for ANABELLA MORRIS (MRN [id number]) as of 5/22/2021 11:34   Ref. Range 5/21/2021 11:50   SARS-CoV-2 by PCR Unknown NotDetected   SARS-CoV-2 Source Unknown NP Swab       MRI/Therapy evaluations pending. Mobility and self care scores 24/24     Thank you for the referral.

## 2021-05-22 NOTE — CARE PLAN
The patient is Stable - Low risk of patient condition declining or worsening    Shift Goals  Clinical Goals: Patient to not have new onset of CVA like symptoms.    Progress made toward(s) clinical / shift goals:  Patient has not hand any new CVA like symptoms during this shift.    Patient is not progressing towards the following goals:N/A.

## 2021-05-22 NOTE — CARE PLAN
"  Problem: Psychosocial - Patient Condition  Goal: Patient's ability to verbalize feelings about condition will improve  Outcome: Progressing  Note: Pt able to clearly express concerns about facial drooping and \"odd\" medical history over the past few months.      Problem: Neuro Status  Goal: Neuro status will remain stable or improve  Outcome: Progressing  Note: Pt scored a 1 on NIH stroke scale and has remained A&Ox4               "

## 2021-05-22 NOTE — PROGRESS NOTES
Bedside report received from Carin TURNER. Patient is in bed and stable. Bed locked and in lowest position, upper side rails up, call light in reach, whiteboard updated. POC discussed and pt verbalizes understanding.       [Feeling Fatigued] : feeling fatigued [Eyeglasses] : currently wearing eyeglasses [Dyspnea on exertion] : dyspnea during exertion [Chest Pain] : chest pain [Palpitations] : palpitations [Joint Pain] : joint pain [Dizziness] : dizziness [Anxiety] : anxiety [Negative] : Genitourinary [Fever] : no fever [Headache] : no headache [Recent Weight Gain (___ Lbs)] : no recent weight gain [Chills] : no chills [Recent Weight Loss (___ Lbs)] : no recent weight loss [Skin: A Rash] : no rash: [Depression] : no depression [Excessive Thirst] : no polydipsia [Easy Bleeding] : no tendency for easy bleeding [Easy Bruising] : no tendency for easy bruising [FreeTextEntry2] : I BS

## 2021-05-23 ENCOUNTER — APPOINTMENT (OUTPATIENT)
Dept: RADIOLOGY | Facility: MEDICAL CENTER | Age: 56
End: 2021-05-23
Attending: PSYCHIATRY & NEUROLOGY
Payer: COMMERCIAL

## 2021-05-23 VITALS
SYSTOLIC BLOOD PRESSURE: 122 MMHG | BODY MASS INDEX: 18.61 KG/M2 | OXYGEN SATURATION: 98 % | HEART RATE: 76 BPM | WEIGHT: 122.8 LBS | DIASTOLIC BLOOD PRESSURE: 85 MMHG | HEIGHT: 68 IN | RESPIRATION RATE: 18 BRPM | TEMPERATURE: 98.8 F

## 2021-05-23 PROCEDURE — A9576 INJ PROHANCE MULTIPACK: HCPCS | Performed by: PSYCHIATRY & NEUROLOGY

## 2021-05-23 PROCEDURE — 70553 MRI BRAIN STEM W/O & W/DYE: CPT

## 2021-05-23 PROCEDURE — 70543 MRI ORBT/FAC/NCK W/O &W/DYE: CPT

## 2021-05-23 PROCEDURE — A9270 NON-COVERED ITEM OR SERVICE: HCPCS | Performed by: INTERNAL MEDICINE

## 2021-05-23 PROCEDURE — G0378 HOSPITAL OBSERVATION PER HR: HCPCS

## 2021-05-23 PROCEDURE — 99217 PR OBSERVATION CARE DISCHARGE: CPT | Performed by: HOSPITALIST

## 2021-05-23 PROCEDURE — 700117 HCHG RX CONTRAST REV CODE 255: Performed by: PSYCHIATRY & NEUROLOGY

## 2021-05-23 PROCEDURE — 700102 HCHG RX REV CODE 250 W/ 637 OVERRIDE(OP): Performed by: INTERNAL MEDICINE

## 2021-05-23 RX ADMIN — GADOTERIDOL 10 ML: 279.3 INJECTION, SOLUTION INTRAVENOUS at 11:48

## 2021-05-23 RX ADMIN — ACETAMINOPHEN 650 MG: 325 TABLET ORAL at 04:10

## 2021-05-23 ASSESSMENT — PAIN DESCRIPTION - PAIN TYPE
TYPE: ACUTE PAIN

## 2021-05-23 ASSESSMENT — FIBROSIS 4 INDEX: FIB4 SCORE: 1.29

## 2021-05-23 NOTE — PROGRESS NOTES
Pt awakens c/o posterior headache. Tylenol PRN per MAR given. Right eyelid ptosis is more prominent that earlier in shift. However, the remainder of neuro assessment is unchanged and WDL. Safety precautions in place. Call light in reach.

## 2021-05-23 NOTE — PROGRESS NOTES
Assessment complete. Pt denies pain or discomfort. PIV flushes well. Neuro check WDL. Pt reports tingling in LUE has resolved. NIHSS score 0. Slight ptosis of right eye remains. No noted visual deficits. Safety precautions in place. Call light in reach.

## 2021-05-23 NOTE — DISCHARGE INSTRUCTIONS
Discharge Instructions    Discharged to home by car with relative. Discharged via wheelchair, hospital escort: Yes.  Special equipment needed: Not Applicable    Be sure to schedule a follow-up appointment with your primary care doctor or any specialists as instructed.     Discharge Plan:   Diet Plan: Discussed  Activity Level: Discussed  Confirmed Symptoms Management: Discussed    I understand that a diet low in cholesterol, fat, and sodium is recommended for good health. Unless I have been given specific instructions below for another diet, I accept this instruction as my diet prescription.   Other diet: no restrictions    Special Instructions: None    · Is patient discharged on Warfarin / Coumadin?   No         Depression / Suicide Risk    As you are discharged from this [facility] facility, it is important to learn how to keep safe from harming yourself.    Recognize the warning signs:  · Abrupt changes in personality, positive or negative- including increase in energy   · Giving away possessions  · Change in eating patterns- significant weight changes-  positive or negative  · Change in sleeping patterns- unable to sleep or sleeping all the time   · Unwillingness or inability to communicate  · Depression  · Unusual sadness, discouragement and loneliness  · Talk of wanting to die  · Neglect of personal appearance   · Rebelliousness- reckless behavior  · Withdrawal from people/activities they love  · Confusion- inability to concentrate     If you or a loved one observes any of these behaviors or has concerns about self-harm, here's what you can do:  · Talk about it- your feelings and reasons for harming yourself  · Remove any means that you might use to hurt yourself (examples: pills, rope, extension cords, firearm)  · Get professional help from the community (Mental Health, Substance Abuse, psychological counseling)  · Do not be alone:Call your Safe Contact- someone whom you trust who will be there for  you.  · Call your local CRISIS HOTLINE 210-2197 or 837-751-8984  · Call your local Children's Mobile Crisis Response Team [place] (835) 759-3369 or www.[employer]  · Call the toll free National Suicide Prevention Hotlines   · National Suicide Prevention Lifeline 367-959-QADP (4882)  · National [place] Line Network 800-SUICIDE (790-2746)

## 2021-05-23 NOTE — PROGRESS NOTES
Report received from Brea TURNER. POC discussed, including plan for MRI in the morning. Questions answered. Pt resting comfortably in bed. Safety precautions in place.

## 2021-05-23 NOTE — PROGRESS NOTES
Report given to Kaela TURNER. POC discussed. Pt resting comfortably in bed. Safety precautions in place.

## 2021-05-23 NOTE — CARE PLAN
"  Problem: Pain - Standard  Goal: Alleviation of pain or a reduction in pain to the patient’s comfort goal  Outcome: Not Progressing   Pt continues to have complaints of headache. Seems to have improved with caffeine.   Problem: Optimal Care of the Stroke Patient  Goal: Optimal emergency care for the stroke patient  Outcome: Progressing     Problem: Neuro Status  Goal: Neuro status will remain stable or improve  Outcome: Progressing   Remains A/O x 4   Problem: Hemodynamic Monitoring  Goal: Patient's hemodynamics, fluid balance and neurologic status will be stable or improve  Outcome: Progressing     Problem: Respiratory - Stroke Patient  Goal: Patient will achieve/maintain optimum respiratory rate/effort  Outcome: Progressing   Pt denies any SOB, coughing and deep breathing encouraged.   Problem: Knowledge Deficit - Standard  Goal: Patient and family/care givers will demonstrate understanding of plan of care, disease process/condition, diagnostic tests and medications  Outcome: Progressing     Problem: Dysphagia  Goal: Dysphagia will improve  Outcome: Met     Problem: Risk for Aspiration  Goal: Patient's risk for aspiration will be absent or decrease  Outcome: Met     Problem: Urinary Elimination  Goal: Establish and maintain regular urinary output  Outcome: Met     Problem: Bowel Elimination  Goal: Establish and maintain regular bowel function  Outcome: Met   The patient is Stable - Low risk of patient condition declining or worsening    Shift Goals  Clinical Goals: Neuro checks will remain WNL  Patient Goals: Headache resolved  Family Goals: No Family present    Progress made toward(s) clinical / shift goals: Headache resolved at this time but continues to state that it \"Comes and goes\"     Patient is not progressing towards the following goals:      Problem: Pain - Standard  Goal: Alleviation of pain or a reduction in pain to the patient’s comfort goal  Outcome: Not Progressing     "

## 2021-05-23 NOTE — PROGRESS NOTES
Assumed pt care. AOx4. Pt c/o headache. Caffeine provided.  Denies any other distress.  Discussed POC.  Pt verbalized understanding.  Hourly rounding in place. Fall precautions in place and call lights w/in reach.  Right eye Ptosis present.           1000 After discussing with pt. Pt states that she was started on Estrogen patch aprx two weeks ago after follow up with Ob/gyn that levels were low/  Pt currently has patch in place. Will update MD. At this time no Ptosis, no blurred vision pt has no complaints. Hourly rounding remains in place.

## 2021-05-23 NOTE — DISCHARGE SUMMARY
"Discharge Summary    CHIEF COMPLAINT ON ADMISSION  Chief Complaint   Patient presents with   • Facial Droop   • Headache   • Neck Pain       Reason for Admission  fatigue, R eye drooping, headache,      Admission Date  5/21/2021    CODE STATUS  Full Code    HPI & HOSPITAL COURSE  For full details of admission please see the H&P of Dr. Fang dated 5/21/2021, briefly, \"55 y.o. female who presented 5/21/2021 with right eye ptosis that started last night.  She had a 12 hour hormone testing with sputum collection and was abstaining from caffeine and food during this time. Usually she drinks 2-3 pots of coffee per day because of the extreme fatigue she has had for the past 6 months.  She also has a history of uterine ablation for heavy periods about 1 year ago.  She recently followed up with her gynecologist and was started on estrogen patch about 2 weeks ago but hasn't felt any changes since then.  Her main complaints are of severe fatigue, hair loss, weight gain of 10 lbs without dietary changes in a 6 month period.  She went to a naturopath who prescribed armour thyroid and her PCP recently instructed her not to take this so she stopped it about 3 weeks ago.  She will be admitted for a neurologic workup to make sure stroke is not present but following neurology evaluation in the ED, likelihood of ischemia is very low.  MRI brain and orbits pending, echo, tele monitoring and TSH with reflex to T4 to be done.  If these studies are normal, then patient likely to discharge home and follow up with her PCP and gynecologist for continued workup. \"    Patient underwent stroke work-up initiated upon admission.  Work-up was unremarkable.  Ptosis was minimally unchanged.  Patient is stable for discharge close outpatient follow-up.       Therefore, she is discharged in good and stable condition to home with close outpatient follow-up.  Patient was stable for discharge and fewer than 2 midnights as anticipated time replacement on " "the observation status.        Discharge Date  05/23/21      FOLLOW UP ITEMS POST DISCHARGE  None    DISCHARGE DIAGNOSES  Principal Problem:    Acquired mechanical ptosis of right eyelid POA: Unknown  Active Problems:    Genital herpes simplex POA: Yes    Weight gain POA: Unknown    History of endometrial ablation POA: Unknown    Malaise POA: Unknown  Resolved Problems:    * No resolved hospital problems. *      FOLLOW UP  No future appointments.  [facility]  [address] [place] [id number] [phone]            MEDICATIONS ON DISCHARGE     Medication List      CONTINUE taking these medications      Instructions   [place] Thyroid 60 MG Tabs  Generic drug: thyroid   Take 60 mg by mouth every morning before breakfast.  Dose: 60 mg     estradiol 0.05 MG/24HR Pttw  Commonly known as: VIVELLE DOT   Place 1 Patch on the skin two times a week. Sandoz Brand (generic)  Dose: 1 Patch     losartan 25 MG Tabs  Commonly known as: COZAAR   Take 25 mg by mouth every day.  Dose: 25 mg     MELATONIN PO   Take 1 tablet by mouth at bedtime as needed.  Dose: 1 tablet     MULTIVITAMIN ADULT PO   Take 1 tablet by mouth every day.  Dose: 1 tablet     sodium chloride 1 GM Tabs  Commonly known as: SALT   Take 1 g by mouth see administration instructions. BID to TID  Dose: 1 g     vitamin D 1000 Unit (25 mcg) Tabs  Commonly known as: cholecalciferol   Take 1,000 Units by mouth every day.  Dose: 1,000 Units            Allergies  Allergies   Allergen Reactions   • Sulfa Drugs Rash     Rash and shortness of breath   • Vicodin [Hydrocodone-Acetaminophen] Itching     \"bugs crawling on me\"       DIET  Orders Placed This Encounter   Procedures   • Diet Order Diet: Regular     Standing Status:   Standing     Number of Occurrences:   1     Order Specific Question:   Diet:     Answer:   Regular [1]       ACTIVITY  As tolerated.  Weight bearing as tolerated    CONSULTATIONS  None    PROCEDURES  MR-ORBITS,FACE,NECK-WITH&W/O & " SEQUENCES   Final Result         MRI OF THE ORBITS WITHOUT AND WITH CONTRAST WITHIN NORMAL LIMITS.      MR-BRAIN-WITH & W/O   Final Result      MRI of the brain without and with contrast within normal limits.      EC-ECHOCARDIOGRAM COMPLETE W/O CONT   Final Result      CT-CEREBRAL PERFUSION ANALYSIS   Final Result      1.  Cerebral blood flow less than 30% likely representing completed infarct = 0 mL.      2.  T Max more than 6 seconds likely representing combination of completed infarct and ischemia = 0 mL.      3.  Mismatched volume likely representing ischemic brain/penumbra = None      4.  Please note that the cerebral perfusion was performed on the limited brain tissue around the basal ganglia region. Infarct/ischemia outside the CT perfusion sections can be missed in this study.      CT-CTA NECK WITH & W/O-POST PROCESSING   Final Result      CT angiogram of the neck within normal limits.      CT-CTA HEAD WITH & W/O-POST PROCESS   Final Result      1.  CT angiogram of the [place] within normal limits.      2.  Anatomic variant carotid origin of right posterior cerebral artery and anatomic variant termination of right vertebral artery into posterior inferior cerebellar artery      DX-CHEST-PORTABLE (1 VIEW)   Final Result      No cardiopulmonary abnormality identified.      CT-HEAD W/O   Final Result      Negative noncontrast CT scan of the head / brain.            LABORATORY  Lab Results   Component Value Date    SODIUM 138 05/22/2021    POTASSIUM 4.0 05/22/2021    CHLORIDE 103 05/22/2021    CO2 22 05/22/2021    GLUCOSE 83 05/22/2021    BUN 10 05/22/2021    CREATININE 0.59 05/22/2021        Lab Results   Component Value Date    WBC 7.7 05/22/2021    HEMOGLOBIN 12.3 05/22/2021    HEMATOCRIT 38.7 05/22/2021    PLATELETCT 217 05/22/2021

## 2021-05-23 NOTE — PROGRESS NOTES
Rounding complete. Pt resting comfortably in bed with eyes closed, rouses easily. Pt denies pain or discomfort. Neuro check remains WDL. No needs at this time. Safety precautions in place. Call light in reach.

## 2021-05-23 NOTE — PROGRESS NOTES
Telemetry Shift Summary    Rhythm SR-SB  HR Range 40s-60s  Ectopy none  Measurements 0.20/0.08/0.44        Normal Values  Rhythm SR  HR Range    Measurements 0.12-0.20 / 0.06-0.10  / 0.30-0.52

## 2021-05-23 NOTE — PROGRESS NOTES
Monitor Summary     Rhythm: NSR  Measurements: 0.14/0.06/0.42  ECTOPIES: None  HR 62-82        Normal Values  Rhythm SR  HR Range    Measurements 0.12-0.20 / 0.06-0.10  / 0.30-0.52

## 2021-05-23 NOTE — CARE PLAN
The patient is Stable - Low risk of patient condition declining or worsening    Shift Goals  Clinical Goals: Patient will coontinue to have neuro assessments WDL    Progress made toward(s) clinical / shift goals:  Neuro checks remain WDL. Pt reports resolved tingling in LUE.    Patient is not progressing towards the following goals:

## 2021-05-23 NOTE — PROGRESS NOTES
Pt off floor for MRI with transport.     ~1230 Pt arrived back from MRI. Pt has no complaints at this time. Neuro assessment remains WNL.

## 2021-05-23 NOTE — PROGRESS NOTES
Pt aox4, discharge summary given, discharge education provided.  Pt verbalized understanding.  Follow up scheduled by patient. IV removed. All belongings returned. Escorted by staff to ER.

## 2021-05-23 NOTE — PROGRESS NOTES
Per  pt had [employer] tennis shoes that are leopard with a white heel. Pt room changed and previous room no shoes to be found in either. Pt has all other belongings with self. Advised to call back if found at home.  states that he brought them up from er and placed into cubby. Checked lost and found as well. Charge RN Paris jean-baptiste.

## 2021-05-27 ENCOUNTER — OFFICE VISIT (OUTPATIENT)
Dept: NEPHROLOGY | Facility: MEDICAL CENTER | Age: 56
End: 2021-05-27
Payer: COMMERCIAL

## 2021-05-27 VITALS
DIASTOLIC BLOOD PRESSURE: 62 MMHG | HEART RATE: 62 BPM | OXYGEN SATURATION: 96 % | SYSTOLIC BLOOD PRESSURE: 100 MMHG | WEIGHT: 117 LBS | BODY MASS INDEX: 17.73 KG/M2 | TEMPERATURE: 96.9 F | HEIGHT: 68 IN

## 2021-05-27 DIAGNOSIS — N18.1 CKD (CHRONIC KIDNEY DISEASE), STAGE I: ICD-10-CM

## 2021-05-27 DIAGNOSIS — E87.1 HYPONATREMIA: ICD-10-CM

## 2021-05-27 DIAGNOSIS — R63.4 WEIGHT LOSS: ICD-10-CM

## 2021-05-27 DIAGNOSIS — R53.83 OTHER FATIGUE: ICD-10-CM

## 2021-05-27 PROCEDURE — 99203 OFFICE O/P NEW LOW 30 MIN: CPT | Performed by: INTERNAL MEDICINE

## 2021-05-27 ASSESSMENT — FIBROSIS 4 INDEX: FIB4 SCORE: 1.29

## 2021-06-01 ASSESSMENT — ENCOUNTER SYMPTOMS
SPEECH CHANGE: 0
FOCAL WEAKNESS: 0
HEADACHES: 0
SENSORY CHANGE: 0
COUGH: 0
EYES NEGATIVE: 1
TINGLING: 0
NAUSEA: 0
PALPITATIONS: 0
TREMORS: 0
BACK PAIN: 1
FLANK PAIN: 0
ORTHOPNEA: 0
DIARRHEA: 0
SINUS PAIN: 0
SHORTNESS OF BREATH: 0
VOMITING: 0
DIZZINESS: 0
CHILLS: 0
ROS SKIN COMMENTS: DRY
HEMOPTYSIS: 0
MYALGIAS: 0
NECK PAIN: 0
FEVER: 0
WEIGHT LOSS: 1
WHEEZING: 0
ABDOMINAL PAIN: 0
SORE THROAT: 0

## 2021-06-01 NOTE — PROGRESS NOTES
Subjective:      Sydney Mckeon is a 55 y.o. female who presents with New Patient (E87.0 (ICD-10-CM) - Hyperosmolality and hypernatremia)            HPI  Sydney is coming today for initial evaluation of sodium imbalance   Noticed over past several months significantly worsening energy level, fatigue,  Poor appetite, weight loss, hair loss, dry skin  Also on laboratory results tendency to hyponatremia  Patient states having occasional thirstiness, polyuria.  Taking salt tablets and drinking electrolyte fluids  CKD I -creat level stable at baseline at 0.6    Review of Systems   Constitutional: Positive for malaise/fatigue and weight loss. Negative for chills and fever.   HENT: Negative for congestion, hearing loss, sinus pain and sore throat.    Eyes: Negative.    Respiratory: Negative for cough, hemoptysis, shortness of breath and wheezing.    Cardiovascular: Negative for chest pain, palpitations, orthopnea and leg swelling.   Gastrointestinal: Negative for abdominal pain, diarrhea, nausea and vomiting.   Genitourinary: Negative for dysuria, flank pain, frequency, hematuria and urgency.   Musculoskeletal: Positive for back pain. Negative for joint pain, myalgias and neck pain.   Skin: Negative.  Negative for itching and rash.        dry   Neurological: Negative for dizziness, tingling, tremors, sensory change, speech change, focal weakness and headaches.   All other systems reviewed and are negative.    Past Medical History:   Diagnosis Date   • Herpes genitalia        Family History   Problem Relation Age of Onset   • Cancer Mother 50        breast   • Dementia Mother    • Diabetes Father    • Other Father         glioblastoma   • Thyroid Father    • Cancer Father         skin cancer   • Heart Attack Father    • Diabetes Maternal Grandmother    • No Known Problems Daughter        Social History     Socioeconomic History   • Marital status:      Spouse name: Not on file   • Number of children: Not on file  "  • Years of education: Not on file   • Highest education level: Not on file   Occupational History   • Not on file   Tobacco Use   • Smoking status: Never Smoker   • Smokeless tobacco: Never Used   Vaping Use   • Vaping Use: Never used   Substance and Sexual Activity   • Alcohol use: No   • Drug use: No   • Sexual activity: Yes     Partners: Male   Other Topics Concern   • Not on file   Social History Narrative   • Not on file     Social Determinants of Health     Financial Resource Strain:    • Difficulty of Paying Living Expenses:    Food Insecurity:    • Worried About Running Out of Food in the Last Year:    • Ran Out of Food in the Last Year:    Transportation Needs:    • Lack of Transportation (Medical):    • Lack of Transportation (Non-Medical):    Physical Activity:    • Days of Exercise per Week:    • Minutes of Exercise per Session:    Stress:    • Feeling of Stress :    Social Connections:    • Frequency of Communication with Friends and Family:    • Frequency of Social Gatherings with Friends and Family:    • Attends Restoration Services:    • Active Member of Clubs or Organizations:    • Attends Club or Organization Meetings:    • Marital Status:    Intimate Partner Violence:    • Fear of Current or Ex-Partner:    • Emotionally Abused:    • Physically Abused:    • Sexually Abused:           Objective:     /62 (BP Location: Right arm, Patient Position: Sitting, BP Cuff Size: Adult)   Pulse 62   Temp 36.1 °C (96.9 °F) (Temporal)   Ht 1.727 m (5' 8\")   Wt 53.1 kg (117 lb)   SpO2 96%   BMI 17.79 kg/m²      Physical Exam  Vitals reviewed.   Constitutional:       General: She is not in acute distress.     Appearance: She is well-developed and underweight. She is not diaphoretic.   HENT:      Head: Normocephalic and atraumatic.      Nose: Nose normal.      Mouth/Throat:      Mouth: Mucous membranes are moist.      Pharynx: Oropharynx is clear.   Eyes:      Extraocular Movements: Extraocular movements " intact.      Conjunctiva/sclera: Conjunctivae normal.      Pupils: Pupils are equal, round, and reactive to light.   Cardiovascular:      Rate and Rhythm: Normal rate and regular rhythm.   Pulmonary:      Effort: Pulmonary effort is normal.      Breath sounds: Normal breath sounds.   Abdominal:      General: Bowel sounds are normal. There is no distension.      Palpations: Abdomen is soft. There is no mass.      Tenderness: There is no abdominal tenderness. There is no left CVA tenderness.   Musculoskeletal:      Cervical back: Normal range of motion and neck supple.      Right lower leg: No edema.      Left lower leg: No edema.   Skin:     General: Skin is warm and dry.      Coloration: Skin is not jaundiced or pale.      Findings: No erythema or rash.   Neurological:      General: No focal deficit present.      Mental Status: She is alert and oriented to person, place, and time.      Cranial Nerves: No cranial nerve deficit.      Coordination: Coordination normal.   Psychiatric:         Mood and Affect: Mood normal.         Behavior: Behavior normal.         Thought Content: Thought content normal.         Judgment: Judgment normal.                   Laboratory results reviewed: d/w pt     Assessment/Plan:        1. CKD (chronic kidney disease), stage I      stable  - Basic Metabolic Panel; Future  - URINALYSIS; Future    2. Other fatigue      3. Weight loss      To increase solid food intake       To monitor weight  - ELECTROLYTES RANDOM URINE; Future    4. Hyponatremia      Increase protein intake  - Basic Metabolic Panel; Future  - OSMOLALITY URINE; Future  - OSMOLALITY SERUM; Future  - ELECTROLYTES RANDOM URINE; Future    Recs;  To increase solid food/protein intake  To monitor weight  Consider Endocrinology evaluation  F/u in 1-2 months with BMP, urine electrolytes, osmolalities

## 2021-06-24 ENCOUNTER — HOSPITAL ENCOUNTER (OUTPATIENT)
Dept: LAB | Facility: MEDICAL CENTER | Age: 56
End: 2021-06-24
Attending: OBSTETRICS & GYNECOLOGY
Payer: COMMERCIAL

## 2021-06-24 LAB
25(OH)D3 SERPL-MCNC: 73 NG/ML (ref 30–100)
T4 SERPL-MCNC: 5.4 UG/DL (ref 4–12)
TSH SERPL DL<=0.005 MIU/L-ACNC: 0.98 UIU/ML (ref 0.38–5.33)

## 2021-06-24 PROCEDURE — 84436 ASSAY OF TOTAL THYROXINE: CPT

## 2021-06-24 PROCEDURE — 36415 COLL VENOUS BLD VENIPUNCTURE: CPT

## 2021-06-24 PROCEDURE — 84443 ASSAY THYROID STIM HORMONE: CPT

## 2021-06-24 PROCEDURE — 82306 VITAMIN D 25 HYDROXY: CPT

## 2021-07-06 ENCOUNTER — HOSPITAL ENCOUNTER (OUTPATIENT)
Dept: LAB | Facility: MEDICAL CENTER | Age: 56
End: 2021-07-06
Attending: INTERNAL MEDICINE
Payer: COMMERCIAL

## 2021-07-06 DIAGNOSIS — E87.1 HYPONATREMIA: ICD-10-CM

## 2021-07-06 DIAGNOSIS — N18.1 CKD (CHRONIC KIDNEY DISEASE), STAGE I: ICD-10-CM

## 2021-07-06 DIAGNOSIS — R63.4 WEIGHT LOSS: ICD-10-CM

## 2021-07-06 LAB
ANION GAP SERPL CALC-SCNC: 7 MMOL/L (ref 7–16)
APPEARANCE UR: CLEAR
BILIRUB UR QL STRIP.AUTO: NEGATIVE
BUN SERPL-MCNC: 11 MG/DL (ref 8–22)
CALCIUM SERPL-MCNC: 9.8 MG/DL (ref 8.5–10.5)
CHLORIDE SERPL-SCNC: 105 MMOL/L (ref 96–112)
CHLORIDE UR-SCNC: 33 MMOL/L
CO2 SERPL-SCNC: 27 MMOL/L (ref 20–33)
COLOR UR: YELLOW
CREAT SERPL-MCNC: 0.59 MG/DL (ref 0.5–1.4)
FASTING STATUS PATIENT QL REPORTED: NORMAL
GLUCOSE SERPL-MCNC: 94 MG/DL (ref 65–99)
GLUCOSE UR STRIP.AUTO-MCNC: NEGATIVE MG/DL
KETONES UR STRIP.AUTO-MCNC: NEGATIVE MG/DL
LEUKOCYTE ESTERASE UR QL STRIP.AUTO: NEGATIVE
MICRO URNS: NORMAL
NITRITE UR QL STRIP.AUTO: NEGATIVE
OSMOLALITY SERPL: 291 MOSM/KG H2O (ref 278–298)
OSMOLALITY UR: 322 MOSM/KG H2O (ref 300–900)
PH UR STRIP.AUTO: 6 [PH] (ref 5–8)
POTASSIUM SERPL-SCNC: 5.2 MMOL/L (ref 3.6–5.5)
POTASSIUM UR-SCNC: 15 MMOL/L
PROT UR QL STRIP: NEGATIVE MG/DL
RBC UR QL AUTO: NEGATIVE
SODIUM SERPL-SCNC: 139 MMOL/L (ref 135–145)
SODIUM UR-SCNC: 42 MMOL/L
SP GR UR STRIP.AUTO: 1.01
UROBILINOGEN UR STRIP.AUTO-MCNC: 0.2 MG/DL

## 2021-07-06 PROCEDURE — 81003 URINALYSIS AUTO W/O SCOPE: CPT

## 2021-07-06 PROCEDURE — 82436 ASSAY OF URINE CHLORIDE: CPT

## 2021-07-06 PROCEDURE — 84300 ASSAY OF URINE SODIUM: CPT

## 2021-07-06 PROCEDURE — 36415 COLL VENOUS BLD VENIPUNCTURE: CPT

## 2021-07-06 PROCEDURE — 84133 ASSAY OF URINE POTASSIUM: CPT

## 2021-07-06 PROCEDURE — 83930 ASSAY OF BLOOD OSMOLALITY: CPT

## 2021-07-06 PROCEDURE — 83935 ASSAY OF URINE OSMOLALITY: CPT

## 2021-07-06 PROCEDURE — 80048 BASIC METABOLIC PNL TOTAL CA: CPT

## 2021-07-12 ENCOUNTER — TELEPHONE (OUTPATIENT)
Dept: HEALTH INFORMATION MANAGEMENT | Facility: MEDICAL CENTER | Age: 56
End: 2021-07-12

## 2021-07-12 NOTE — TELEPHONE ENCOUNTER
HIP patient will be needing replacement shakes in the future.   She called to in inquire about her f/v appt and was informed of the department being currently closed. Patient states is not needing the replacement shakes right away.     Thank You

## 2021-07-14 ENCOUNTER — OFFICE VISIT (OUTPATIENT)
Dept: NEPHROLOGY | Facility: MEDICAL CENTER | Age: 56
End: 2021-07-14
Payer: COMMERCIAL

## 2021-07-14 VITALS
WEIGHT: 112 LBS | RESPIRATION RATE: 16 BRPM | BODY MASS INDEX: 16.97 KG/M2 | DIASTOLIC BLOOD PRESSURE: 60 MMHG | SYSTOLIC BLOOD PRESSURE: 106 MMHG | HEIGHT: 68 IN | TEMPERATURE: 98.4 F | HEART RATE: 61 BPM | OXYGEN SATURATION: 100 %

## 2021-07-14 DIAGNOSIS — N18.1 CKD (CHRONIC KIDNEY DISEASE), STAGE I: ICD-10-CM

## 2021-07-14 DIAGNOSIS — R53.83 OTHER FATIGUE: ICD-10-CM

## 2021-07-14 DIAGNOSIS — R63.4 WEIGHT LOSS: ICD-10-CM

## 2021-07-14 DIAGNOSIS — E87.1 HYPONATREMIA: ICD-10-CM

## 2021-07-14 PROCEDURE — 99214 OFFICE O/P EST MOD 30 MIN: CPT | Performed by: INTERNAL MEDICINE

## 2021-07-14 RX ORDER — NITROFURANTOIN 25; 75 MG/1; MG/1
CAPSULE ORAL
COMMUNITY
Start: 2021-04-29 | End: 2021-11-17

## 2021-07-14 RX ORDER — APRACLONIDINE HYDROCHLORIDE OPHTHALMIC SOLUTION 10 MG/ML
SOLUTION/ DROPS OPHTHALMIC
COMMUNITY
Start: 2021-06-04 | End: 2021-11-17

## 2021-07-14 RX ORDER — CHOLESTYRAMINE 4 G/9G
POWDER, FOR SUSPENSION ORAL
COMMUNITY
Start: 2021-06-03 | End: 2021-11-17

## 2021-07-14 ASSESSMENT — ENCOUNTER SYMPTOMS
CONSTIPATION: 0
MYALGIAS: 0
NAUSEA: 0
COUGH: 0
VOMITING: 0
EYES NEGATIVE: 1
NECK PAIN: 0
WEIGHT LOSS: 1
BACK PAIN: 1
FLANK PAIN: 0
CHILLS: 0
BLOOD IN STOOL: 0
DIARRHEA: 0
SORE THROAT: 0
SINUS PAIN: 0
FEVER: 0
ORTHOPNEA: 0
PALPITATIONS: 0
WHEEZING: 0
HEMOPTYSIS: 0
ABDOMINAL PAIN: 0
SHORTNESS OF BREATH: 0

## 2021-07-14 ASSESSMENT — FIBROSIS 4 INDEX: FIB4 SCORE: 1.29

## 2021-07-14 NOTE — PROGRESS NOTES
Subjective:      Sydney Mckeon is a 55 y.o. female who presents with Follow-Up and Chronic Kidney Disease            Chronic Kidney Disease  Pertinent negatives include no abdominal pain, chest pain, chills, congestion, coughing, fever, myalgias, nausea, neck pain, sore throat or vomiting.     Sydney is coming today for of sodium imbalance   Noticed over past several months significantly worsening energy level, fatigue,  Poor appetite, weight loss, hair loss, dry skin  Also on laboratory results tendency to hyponatremia -currently well controlled  Urine electrolytes results reviewed; no sig abnormalities  Urine and serum osmolalities WNL  CKD I -creat level stable at baseline at 0.6    Review of Systems   Constitutional: Positive for malaise/fatigue and weight loss. Negative for chills and fever.   HENT: Negative for congestion, hearing loss, sinus pain and sore throat.    Eyes: Negative.    Respiratory: Negative for cough, hemoptysis, shortness of breath and wheezing.    Cardiovascular: Negative for chest pain, palpitations, orthopnea and leg swelling.   Gastrointestinal: Negative for abdominal pain, blood in stool, constipation, diarrhea, melena, nausea and vomiting.   Genitourinary: Negative for dysuria, flank pain, frequency, hematuria and urgency.   Musculoskeletal: Positive for back pain. Negative for joint pain, myalgias and neck pain.   Skin: Negative.    All other systems reviewed and are negative.    Past Medical History:   Diagnosis Date   • Herpes genitalia        Family History   Problem Relation Age of Onset   • Cancer Mother 50        breast   • Dementia Mother    • Diabetes Father    • Other Father         glioblastoma   • Thyroid Father    • Cancer Father         skin cancer   • Heart Attack Father    • Diabetes Maternal Grandmother    • No Known Problems Daughter        Social History     Socioeconomic History   • Marital status:      Spouse name: Not on file   • Number of children: Not  "on file   • Years of education: Not on file   • Highest education level: Not on file   Occupational History   • Not on file   Tobacco Use   • Smoking status: Never Smoker   • Smokeless tobacco: Never Used   Vaping Use   • Vaping Use: Never used   Substance and Sexual Activity   • Alcohol use: No   • Drug use: No   • Sexual activity: Yes     Partners: Male   Other Topics Concern   • Not on file   Social History Narrative   • Not on file     Social Determinants of Health     Financial Resource Strain:    • Difficulty of Paying Living Expenses:    Food Insecurity:    • Worried About Running Out of Food in the Last Year:    • Ran Out of Food in the Last Year:    Transportation Needs:    • Lack of Transportation (Medical):    • Lack of Transportation (Non-Medical):    Physical Activity:    • Days of Exercise per Week:    • Minutes of Exercise per Session:    Stress:    • Feeling of Stress :    Social Connections:    • Frequency of Communication with Friends and Family:    • Frequency of Social Gatherings with Friends and Family:    • Attends Confucianist Services:    • Active Member of Clubs or Organizations:    • Attends Club or Organization Meetings:    • Marital Status:    Intimate Partner Violence:    • Fear of Current or Ex-Partner:    • Emotionally Abused:    • Physically Abused:    • Sexually Abused:           Objective:     /60 (BP Location: Right arm, Patient Position: Sitting)   Pulse 61   Temp 36.9 °C (98.4 °F) (Temporal)   Resp 16   Ht 1.727 m (5' 8\")   Wt 50.8 kg (112 lb)   SpO2 100%   BMI 17.03 kg/m²      Physical Exam  Vitals and nursing note reviewed.   Constitutional:       General: She is not in acute distress.     Appearance: Normal appearance. She is well-developed. She is not diaphoretic.   HENT:      Head: Normocephalic and atraumatic.      Nose: Nose normal.      Mouth/Throat:      Mouth: Mucous membranes are moist.      Pharynx: Oropharynx is clear.   Eyes:      General: No scleral " icterus.     Extraocular Movements: Extraocular movements intact.      Conjunctiva/sclera: Conjunctivae normal.      Pupils: Pupils are equal, round, and reactive to light.   Cardiovascular:      Rate and Rhythm: Normal rate and regular rhythm.      Heart sounds: No friction rub. No gallop.    Pulmonary:      Effort: Pulmonary effort is normal. No respiratory distress.      Breath sounds: Normal breath sounds. No wheezing or rales.   Abdominal:      General: Bowel sounds are normal.      Palpations: Abdomen is soft.      Tenderness: There is no right CVA tenderness or left CVA tenderness.   Musculoskeletal:         General: Normal range of motion.      Cervical back: Normal range of motion and neck supple.      Right lower leg: No edema.      Left lower leg: No edema.   Skin:     General: Skin is warm and dry.      Coloration: Skin is pale.      Findings: No erythema or rash.   Neurological:      General: No focal deficit present.      Mental Status: She is alert and oriented to person, place, and time.      Cranial Nerves: No cranial nerve deficit.      Coordination: Coordination normal.   Psychiatric:         Mood and Affect: Mood normal.         Behavior: Behavior normal.         Thought Content: Thought content normal.         Judgment: Judgment normal.                   Laboratory results reviewed: d/w pt     Assessment/Plan:        1. CKD (chronic kidney disease), stage I      stable      2. Other fatigue      3. Weight loss      To increase solid food intake       To monitor weight      4. Hyponatremia -well controlled      Increase protein intake      Recs;  To increase solid food/protein intake  To monitor weight  Endocrinology evaluation  F/u in 6 months

## 2021-07-21 ENCOUNTER — APPOINTMENT (RX ONLY)
Dept: URBAN - METROPOLITAN AREA CLINIC 35 | Facility: CLINIC | Age: 56
Setting detail: DERMATOLOGY
End: 2021-07-21

## 2021-07-21 DIAGNOSIS — Z71.89 OTHER SPECIFIED COUNSELING: ICD-10-CM

## 2021-07-21 DIAGNOSIS — D22 MELANOCYTIC NEVI: ICD-10-CM

## 2021-07-21 DIAGNOSIS — L82.1 OTHER SEBORRHEIC KERATOSIS: ICD-10-CM

## 2021-07-21 DIAGNOSIS — L81.4 OTHER MELANIN HYPERPIGMENTATION: ICD-10-CM

## 2021-07-21 PROBLEM — D22.4 MELANOCYTIC NEVI OF SCALP AND NECK: Status: ACTIVE | Noted: 2021-07-21

## 2021-07-21 PROCEDURE — ? COUNSELING

## 2021-07-21 PROCEDURE — 99213 OFFICE O/P EST LOW 20 MIN: CPT

## 2021-07-21 ASSESSMENT — LOCATION SIMPLE DESCRIPTION DERM
LOCATION SIMPLE: SCALP
LOCATION SIMPLE: RIGHT SHOULDER
LOCATION SIMPLE: RIGHT UPPER BACK
LOCATION SIMPLE: POSTERIOR NECK

## 2021-07-21 ASSESSMENT — LOCATION DETAILED DESCRIPTION DERM
LOCATION DETAILED: RIGHT MEDIAL TRAPEZIAL NECK
LOCATION DETAILED: LEFT SUPERIOR PARIETAL SCALP
LOCATION DETAILED: RIGHT INFERIOR POSTERIOR NECK
LOCATION DETAILED: RIGHT SUPERIOR UPPER BACK
LOCATION DETAILED: RIGHT POSTERIOR SHOULDER

## 2021-07-21 ASSESSMENT — LOCATION ZONE DERM
LOCATION ZONE: NECK
LOCATION ZONE: ARM
LOCATION ZONE: TRUNK
LOCATION ZONE: SCALP

## 2021-07-22 ENCOUNTER — HOSPITAL ENCOUNTER (OUTPATIENT)
Dept: LAB | Facility: MEDICAL CENTER | Age: 56
End: 2021-07-22
Attending: NURSE PRACTITIONER
Payer: COMMERCIAL

## 2021-07-23 ENCOUNTER — HOSPITAL ENCOUNTER (OUTPATIENT)
Dept: LAB | Facility: MEDICAL CENTER | Age: 56
End: 2021-07-23
Attending: NURSE PRACTITIONER
Payer: COMMERCIAL

## 2021-07-23 LAB
CORTIS SERPL-MCNC: 16.9 UG/DL (ref 0–23)
CRP SERPL HS-MCNC: <0.3 MG/DL (ref 0–0.75)
T3FREE SERPL-MCNC: 2.67 PG/ML (ref 2–4.4)
T4 FREE SERPL-MCNC: 0.96 NG/DL (ref 0.93–1.7)

## 2021-07-23 PROCEDURE — 86140 C-REACTIVE PROTEIN: CPT

## 2021-07-23 PROCEDURE — 82024 ASSAY OF ACTH: CPT

## 2021-07-23 PROCEDURE — 84481 FREE ASSAY (FT-3): CPT

## 2021-07-23 PROCEDURE — 82955 ASSAY OF G6PD ENZYME: CPT

## 2021-07-23 PROCEDURE — 86800 THYROGLOBULIN ANTIBODY: CPT

## 2021-07-23 PROCEDURE — 36415 COLL VENOUS BLD VENIPUNCTURE: CPT

## 2021-07-23 PROCEDURE — 83520 IMMUNOASSAY QUANT NOS NONAB: CPT

## 2021-07-23 PROCEDURE — 84482 T3 REVERSE: CPT

## 2021-07-23 PROCEDURE — 83516 IMMUNOASSAY NONANTIBODY: CPT

## 2021-07-23 PROCEDURE — 83519 RIA NONANTIBODY: CPT

## 2021-07-23 PROCEDURE — 86710 INFLUENZA VIRUS ANTIBODY: CPT

## 2021-07-23 PROCEDURE — 86376 MICROSOMAL ANTIBODY EACH: CPT

## 2021-07-23 PROCEDURE — 84439 ASSAY OF FREE THYROXINE: CPT

## 2021-07-23 PROCEDURE — 82533 TOTAL CORTISOL: CPT

## 2021-07-25 LAB — G6PD RBC-CCNC: 13.9 U/G HB (ref 9.9–16.6)

## 2021-07-26 LAB
ACTH PLAS-MCNC: 12.5 PG/ML (ref 7.2–63.3)
GLIADIN IGA SER IA-ACNC: 6 UNITS (ref 0–19)
GLIADIN IGG SER IA-ACNC: 2 UNITS (ref 0–19)
THYROGLOB AB SERPL-ACNC: <0.9 IU/ML (ref 0–4)
THYROPEROXIDASE AB SERPL-ACNC: 0.6 IU/ML (ref 0–9)

## 2021-07-27 LAB — MISCELLANEOUS LAB RESULT MISCLAB: NORMAL

## 2021-07-28 LAB
MISCELLANEOUS LAB RESULT MISCLAB: NORMAL
T3REVERSE SERPL-MCNC: 9 NG/DL (ref 9–27)

## 2021-07-30 LAB — MISCELLANEOUS LAB RESULT MISCLAB: NORMAL

## 2021-08-03 LAB — MISCELLANEOUS LAB RESULT MISCLAB: NORMAL

## 2021-08-09 LAB — MISCELLANEOUS LAB RESULT MISCLAB: NORMAL

## 2021-09-17 ENCOUNTER — HOSPITAL ENCOUNTER (OUTPATIENT)
Dept: LAB | Facility: MEDICAL CENTER | Age: 56
End: 2021-09-17
Attending: NURSE PRACTITIONER
Payer: COMMERCIAL

## 2021-09-17 LAB
FERRITIN SERPL-MCNC: 72.3 NG/ML (ref 10–291)
T3 SERPL-MCNC: 117 NG/DL (ref 60–181)
TSH SERPL DL<=0.005 MIU/L-ACNC: 1.13 UIU/ML (ref 0.38–5.33)

## 2021-09-17 PROCEDURE — 36415 COLL VENOUS BLD VENIPUNCTURE: CPT

## 2021-09-17 PROCEDURE — 84480 ASSAY TRIIODOTHYRONINE (T3): CPT

## 2021-09-17 PROCEDURE — 82728 ASSAY OF FERRITIN: CPT

## 2021-09-17 PROCEDURE — 84140 ASSAY OF PREGNENOLONE: CPT

## 2021-09-17 PROCEDURE — 84443 ASSAY THYROID STIM HORMONE: CPT

## 2021-09-20 LAB — PREG SERPL-MCNC: 80 NG/DL (ref 15–132)

## 2021-11-17 ENCOUNTER — OFFICE VISIT (OUTPATIENT)
Dept: ENDOCRINOLOGY | Facility: MEDICAL CENTER | Age: 56
End: 2021-11-17
Attending: INTERNAL MEDICINE
Payer: COMMERCIAL

## 2021-11-17 VITALS
HEART RATE: 94 BPM | WEIGHT: 119 LBS | SYSTOLIC BLOOD PRESSURE: 100 MMHG | HEIGHT: 68 IN | BODY MASS INDEX: 18.04 KG/M2 | DIASTOLIC BLOOD PRESSURE: 76 MMHG | OXYGEN SATURATION: 98 %

## 2021-11-17 DIAGNOSIS — R53.82 CHRONIC FATIGUE: ICD-10-CM

## 2021-11-17 PROCEDURE — 99203 OFFICE O/P NEW LOW 30 MIN: CPT | Performed by: INTERNAL MEDICINE

## 2021-11-17 PROCEDURE — 99211 OFF/OP EST MAY X REQ PHY/QHP: CPT | Performed by: INTERNAL MEDICINE

## 2021-11-17 RX ORDER — PROGESTERONE 100 MG/1
CAPSULE ORAL
COMMUNITY
Start: 2021-10-13 | End: 2023-08-28

## 2021-11-17 RX ORDER — THYROID 60 MG/1
60 TABLET ORAL DAILY
COMMUNITY
End: 2023-01-19

## 2021-11-17 ASSESSMENT — FIBROSIS 4 INDEX: FIB4 SCORE: 1.31

## 2021-11-17 NOTE — PROGRESS NOTES
Chief Complaint: Consult requested by TEZ Elliott for evaluation of fatigue    HPI:     Sydney Mckeon is a 56 y.o. female with history of the above medical issue.  The patient endorses that she has had extreme fatigue for the past year with trouble sleeping and waking up with no energy and forcing herself to go exercise and go to the gym.  She also reports hair loss for the past year and endorses loss of hair on the back are on the top and side of her scalp.      She is currently seeing an integrative physician  She is also receiving hormone replacement therapy with estradiol and progesterone from her OB/GYN      According to the notes from the referring physician she had baseline normal TSH levels with a TSH of 1.0 on September 2020 and TSH of 0.48 on April 19, 2021.  Another provider placed the patient on [place] Thyroid which the referring provider disagreed with and she advised the patient to stop the [place] Thyroid    However the patient reports that she went back to her integrative physician who put her back on [place] Thyroid and according to the patient she was diagnosed with subclinical hypothyroidism even though her baseline TSH was normal and free T4 levels were normal and her TPO antibodies were negative ruling out Hashimoto's thyroiditis.      Currently she is on [place] Thyroid 60 mg daily and her TSH was normal at 1.1 in September 2021    Her other work-up showed a normal morning cortisol of 16.9 on July 2021  She had a celiac disease test which was negative  Her vitamin D was adequate at 70 on June 2021  Her ferritin was normal 72 on September 2021 ruling out iron deficiency      Patient's medications, allergies, and social histories were reviewed and updated as appropriate.      ROS:     CONS:     No fever, no chills, no weight loss, reports fatigue   EYES:      No diplopia, no blurry vision, no redness of eyes, no swelling of eyelids   ENT:    No hearing loss, No ear pain, No sore  throat, no dysphagia, no neck swelling   CV:     No chest pain, no palpitations, no claudication, no orthopnea, no PND   PULM:    No SOB, no cough, no hemoptysis, no wheezing    GI:   No nausea, no vomiting, no diarrhea, no constipation, no bloody stools   :  Passing urine well, no dysuria, no hematuria   ENDO:   No polyuria, no polydipsia, no heat intolerance, no cold intolerance   NEURO: No headaches, no dizziness, no convulsions, no tremors   MUSC:  No joint swellings, no arthralgias, no myalgias, no weakness   SKIN:   No rash, no ulcers, no dry skin, reports hair loss   PSYCH:   No depression, no anxiety, no difficulty sleeping       Past Medical History:  Patient Active Problem List    Diagnosis Date Noted   • Acquired mechanical ptosis of right eyelid 05/21/2021   • Weight gain 05/21/2021   • History of endometrial ablation 05/21/2021   • Malaise 05/21/2021   • Menopause 10/23/2020   • Abnormal weight loss 10/23/2020   • Acute nonintractable headache 10/02/2020   • Weight loss 10/02/2020   • Excessive thirst 10/02/2020   • IFG (impaired fasting glucose) 11/14/2019   • Dyslipidemia (high LDL; low HDL) 11/14/2019   • Genital herpes simplex 04/16/2018   • Family history of melanoma 04/16/2018   • Vitamin D deficiency 04/16/2018   • Insomnia 04/16/2018   • Healthcare maintenance 04/16/2018       Past Surgical History:  Past Surgical History:   Procedure Laterality Date   • HYSTEROSCOPY NOVASURE-2 N/A 8/27/2018    Procedure: HYSTEROSCOPY NOVASURE;  Surgeon: Amira Dubois M.D.;  Location: SURGERY SAME DAY [facility];  Service: Gynecology   • DILATION AND CURETTAGE N/A 8/27/2018    Procedure: DILATION AND CURETTAGE;  Surgeon: Amira Dubois M.D.;  Location: SURGERY SAME DAY [facility];  Service: Gynecology   • APPENDECTOMY      emergent        Allergies:  Sulfa drugs and Vicodin [hydrocodone-acetaminophen]     Current Medications:    Current Outpatient Medications:   •  progesterone (PROMETRIUM) 100 MG  Cap, , Disp: , Rfl:   •  thyroid (ARMOUR THYROID) 60 MG Tab, Take 60 mg by mouth every day., Disp: , Rfl:   •  sodium chloride (SALT) 1 GM Tab, Take 1 g by mouth see administration instructions. BID to TID, Disp: , Rfl:   •  losartan (COZAAR) 25 MG Tab, Take 25 mg by mouth every day., Disp: , Rfl:   •  Estradiol 0.025 MG/24HR PATCH BIWEEKLY, Place 1 Patch on the skin two times a week. Sandoz Brand (generic), Disp: , Rfl:   •  MELATONIN PO, Take 1 tablet by mouth at bedtime as needed., Disp: , Rfl:   •  Multiple Vitamins-Minerals (MULTIVITAMIN ADULT PO), Take 1 tablet by mouth every day., Disp: , Rfl:   •  vitamin D (CHOLECALCIFEROL) 1000 UNIT Tab, Take 1,000 Units by mouth every day., Disp: , Rfl:     Social History:  Social History     Socioeconomic History   • Marital status:      Spouse name: Not on file   • Number of children: Not on file   • Years of education: Not on file   • Highest education level: Not on file   Occupational History   • Not on file   Tobacco Use   • Smoking status: Never Smoker   • Smokeless tobacco: Never Used   Vaping Use   • Vaping Use: Never used   Substance and Sexual Activity   • Alcohol use: No   • Drug use: No   • Sexual activity: Yes     Partners: Male   Other Topics Concern   • Not on file   Social History Narrative   • Not on file     Social Determinants of Health     Financial Resource Strain:    • Difficulty of Paying Living Expenses: Not on file   Food Insecurity:    • Worried About Running Out of Food in the Last Year: Not on file   • Ran Out of Food in the Last Year: Not on file   Transportation Needs:    • Lack of Transportation (Medical): Not on file   • Lack of Transportation (Non-Medical): Not on file   Physical Activity:    • Days of Exercise per Week: Not on file   • Minutes of Exercise per Session: Not on file   Stress:    • Feeling of Stress : Not on file   Social Connections:    • Frequency of Communication with Friends and Family: Not on file   • Frequency of  "Social Gatherings with Friends and Family: Not on file   • Attends Religion Services: Not on file   • Active Member of Clubs or Organizations: Not on file   • Attends Club or Organization Meetings: Not on file   • Marital Status: Not on file   Intimate Partner Violence:    • Fear of Current or Ex-Partner: Not on file   • Emotionally Abused: Not on file   • Physically Abused: Not on file   • Sexually Abused: Not on file   Housing Stability:    • Unable to Pay for Housing in the Last Year: Not on file   • Number of Places Lived in the Last Year: Not on file   • Unstable Housing in the Last Year: Not on file        Family History:   Family History   Problem Relation Age of Onset   • Cancer Mother 50        breast   • Dementia Mother    • Diabetes Father    • Other Father         glioblastoma   • Thyroid Father    • Cancer Father         skin cancer   • Heart Attack Father    • Diabetes Maternal Grandmother    • No Known Problems Daughter          PHYSICAL EXAM:   Vital signs: /76 (BP Location: Left arm, Patient Position: Sitting, BP Cuff Size: Adult)   Pulse 94   Ht 1.727 m (5' 8\")   Wt 54 kg (119 lb)   SpO2 98%   BMI 18.09 kg/m²   GENERAL: Well-developed, well-nourished  in no apparent distress.   EYE: No ocular and eyelid asymmetry, Anicteric sclerae,  PERRL  HENT: Hearing grossly intact, Normocephalic, atraumatic. Pink, moist mucous membranes, No exudate  NECK: Supple. Trachea midline. thyroid is normal in size without nodules or tenderness  CARDIOVASCULAR: Regular rate and rhythm. No murmurs, rubs, or gallops.   LUNGS: Clear to auscultation bilaterally   ABDOMEN: Soft, nontender with positive bowel sounds.   EXTREMITIES: No clubbing, cyanosis, or edema.   NEUROLOGICAL: Cranial nerves II-XII are grossly intact   Symmetric reflexes at the patella no proximal muscle weakness  LYMPH: No cervical, supraclavicular,  adenopathy palpated.   SKIN: No rashes, lesions. Turgor is normal.    Labs:  Lab Results "   Component Value Date/Time    WBC 7.7 05/22/2021 02:02 AM    RBC 4.14 (L) 05/22/2021 02:02 AM    HEMOGLOBIN 12.3 05/22/2021 02:02 AM    MCV 93.5 05/22/2021 02:02 AM    MCH 29.7 05/22/2021 02:02 AM    MCHC 31.8 (L) 05/22/2021 02:02 AM    RDW 44.0 05/22/2021 02:02 AM    MPV 11.5 05/22/2021 02:02 AM       Lab Results   Component Value Date/Time    SODIUM 139 07/06/2021 06:36 AM    POTASSIUM 5.2 07/06/2021 06:36 AM    CHLORIDE 105 07/06/2021 06:36 AM    CO2 27 07/06/2021 06:36 AM    ANION 7.0 07/06/2021 06:36 AM    GLUCOSE 94 07/06/2021 06:36 AM    BUN 11 07/06/2021 06:36 AM    CREATININE 0.59 07/06/2021 06:36 AM    CALCIUM 9.8 07/06/2021 06:36 AM    ASTSGOT 22 05/22/2021 02:02 AM    ALTSGPT 18 05/22/2021 02:02 AM    TBILIRUBIN 1.9 (H) 05/22/2021 02:02 AM    ALBUMIN 3.9 05/22/2021 02:02 AM    TOTPROTEIN 6.4 05/22/2021 02:02 AM    GLOBULIN 2.5 05/22/2021 02:02 AM    AGRATIO 1.6 05/22/2021 02:02 AM       Lab Results   Component Value Date/Time    CHOLSTRLTOT 196 05/22/2021 0202    TRIGLYCERIDE 60 05/22/2021 0202    HDL 82 05/22/2021 0202     (H) 05/22/2021 0202       Lab Results   Component Value Date/Time    TSHULTRASEN 1.130 09/17/2021 0801     Lab Results   Component Value Date/Time    FREET4 0.96 07/23/2021 0807     Lab Results   Component Value Date/Time    FREET3 2.67 07/23/2021 0807     No results found for: THYSTIMIG    Lab Results   Component Value Date/Time    MICROSOMALA 0.6 07/23/2021 0807         Imaging:      ASSESSMENT/PLAN:     1. Chronic fatigue  Stable explained to patient that she does not have objective evidence of hypothyroidism and Hashimoto's thyroiditis based on baseline labs.  I am concerned that she is taking [place] Thyroid even though her baseline numbers show that she is euthyroid.    I am going to repeat her thyroid labs vitamin D B12 and also screen for [place]'s disease and check 21-hydroxylase antibody levels today but I explained to her that based on her previous work-up there is  no hormonal basis for her fatigue.    I will update her on the test results and make additional recommendations if necessary.  I do want her to follow up again with her primary care physician.  If she wants to continue taking [place] Thyroid she can do so but this has to be prescribed by the integrative physician who originally prescribed it even though her TSH levels were normal        Thank you kindly for allowing me to participate in the thyroid care plan for this patient.    Shashi Babcock MD, FACE, ECNU  11/17/21    CC:   TEZ Elliott

## 2022-02-01 ENCOUNTER — HOSPITAL ENCOUNTER (OUTPATIENT)
Dept: LAB | Facility: MEDICAL CENTER | Age: 57
End: 2022-02-01
Attending: INTERNAL MEDICINE
Payer: COMMERCIAL

## 2022-02-01 DIAGNOSIS — R53.82 CHRONIC FATIGUE: ICD-10-CM

## 2022-02-01 LAB
25(OH)D3 SERPL-MCNC: 67 NG/ML (ref 30–100)
ALBUMIN SERPL BCP-MCNC: 4.8 G/DL (ref 3.2–4.9)
ALBUMIN/GLOB SERPL: 1.9 G/DL
ALP SERPL-CCNC: 75 U/L (ref 30–99)
ALT SERPL-CCNC: 13 U/L (ref 2–50)
ANION GAP SERPL CALC-SCNC: 12 MMOL/L (ref 7–16)
AST SERPL-CCNC: 21 U/L (ref 12–45)
BASOPHILS # BLD AUTO: 1 % (ref 0–1.8)
BASOPHILS # BLD: 0.06 K/UL (ref 0–0.12)
BILIRUB SERPL-MCNC: 0.9 MG/DL (ref 0.1–1.5)
BUN SERPL-MCNC: 14 MG/DL (ref 8–22)
CALCIUM SERPL-MCNC: 9.6 MG/DL (ref 8.5–10.5)
CHLORIDE SERPL-SCNC: 104 MMOL/L (ref 96–112)
CO2 SERPL-SCNC: 25 MMOL/L (ref 20–33)
CREAT SERPL-MCNC: 0.66 MG/DL (ref 0.5–1.4)
EOSINOPHIL # BLD AUTO: 0.07 K/UL (ref 0–0.51)
EOSINOPHIL NFR BLD: 1.2 % (ref 0–6.9)
ERYTHROCYTE [DISTWIDTH] IN BLOOD BY AUTOMATED COUNT: 44.9 FL (ref 35.9–50)
FASTING STATUS PATIENT QL REPORTED: NORMAL
FERRITIN SERPL-MCNC: 55 NG/ML (ref 10–291)
GLOBULIN SER CALC-MCNC: 2.5 G/DL (ref 1.9–3.5)
GLUCOSE SERPL-MCNC: 93 MG/DL (ref 65–99)
HCT VFR BLD AUTO: 41.9 % (ref 37–47)
HGB BLD-MCNC: 13.5 G/DL (ref 12–16)
IMM GRANULOCYTES # BLD AUTO: 0.01 K/UL (ref 0–0.11)
IMM GRANULOCYTES NFR BLD AUTO: 0.2 % (ref 0–0.9)
IRON SATN MFR SERPL: 37 % (ref 15–55)
IRON SERPL-MCNC: 110 UG/DL (ref 40–170)
LYMPHOCYTES # BLD AUTO: 2.1 K/UL (ref 1–4.8)
LYMPHOCYTES NFR BLD: 36.3 % (ref 22–41)
MCH RBC QN AUTO: 30.4 PG (ref 27–33)
MCHC RBC AUTO-ENTMCNC: 32.2 G/DL (ref 33.6–35)
MCV RBC AUTO: 94.4 FL (ref 81.4–97.8)
MONOCYTES # BLD AUTO: 0.28 K/UL (ref 0–0.85)
MONOCYTES NFR BLD AUTO: 4.8 % (ref 0–13.4)
NEUTROPHILS # BLD AUTO: 3.27 K/UL (ref 2–7.15)
NEUTROPHILS NFR BLD: 56.5 % (ref 44–72)
NRBC # BLD AUTO: 0 K/UL
NRBC BLD-RTO: 0 /100 WBC
PLATELET # BLD AUTO: 231 K/UL (ref 164–446)
PMV BLD AUTO: 13.2 FL (ref 9–12.9)
POTASSIUM SERPL-SCNC: 4.5 MMOL/L (ref 3.6–5.5)
PROT SERPL-MCNC: 7.3 G/DL (ref 6–8.2)
RBC # BLD AUTO: 4.44 M/UL (ref 4.2–5.4)
SODIUM SERPL-SCNC: 141 MMOL/L (ref 135–145)
TIBC SERPL-MCNC: 301 UG/DL (ref 250–450)
TSH SERPL DL<=0.005 MIU/L-ACNC: 1.33 UIU/ML (ref 0.38–5.33)
UIBC SERPL-MCNC: 191 UG/DL (ref 110–370)
VIT B12 SERPL-MCNC: 2691 PG/ML (ref 211–911)
WBC # BLD AUTO: 5.8 K/UL (ref 4.8–10.8)

## 2022-02-01 PROCEDURE — 36415 COLL VENOUS BLD VENIPUNCTURE: CPT

## 2022-02-01 PROCEDURE — 82728 ASSAY OF FERRITIN: CPT

## 2022-02-01 PROCEDURE — 83550 IRON BINDING TEST: CPT

## 2022-02-01 PROCEDURE — 85025 COMPLETE CBC W/AUTO DIFF WBC: CPT

## 2022-02-01 PROCEDURE — 83516 IMMUNOASSAY NONANTIBODY: CPT

## 2022-02-01 PROCEDURE — 84443 ASSAY THYROID STIM HORMONE: CPT

## 2022-02-01 PROCEDURE — 82306 VITAMIN D 25 HYDROXY: CPT

## 2022-02-01 PROCEDURE — 83540 ASSAY OF IRON: CPT

## 2022-02-01 PROCEDURE — 80053 COMPREHEN METABOLIC PANEL: CPT

## 2022-02-01 PROCEDURE — 82607 VITAMIN B-12: CPT

## 2022-02-05 LAB — 21HYDROXYLASE AB SER QL: NEGATIVE

## 2022-02-27 SDOH — ECONOMIC STABILITY: FOOD INSECURITY: WITHIN THE PAST 12 MONTHS, THE FOOD YOU BOUGHT JUST DIDN'T LAST AND YOU DIDN'T HAVE MONEY TO GET MORE.: NEVER TRUE

## 2022-02-27 SDOH — ECONOMIC STABILITY: HOUSING INSECURITY
IN THE LAST 12 MONTHS, WAS THERE A TIME WHEN YOU DID NOT HAVE A STEADY PLACE TO SLEEP OR SLEPT IN A SHELTER (INCLUDING NOW)?: NO

## 2022-02-27 SDOH — HEALTH STABILITY: PHYSICAL HEALTH: ON AVERAGE, HOW MANY DAYS PER WEEK DO YOU ENGAGE IN MODERATE TO STRENUOUS EXERCISE (LIKE A BRISK WALK)?: 4 DAYS

## 2022-02-27 SDOH — ECONOMIC STABILITY: INCOME INSECURITY: HOW HARD IS IT FOR YOU TO PAY FOR THE VERY BASICS LIKE FOOD, HOUSING, MEDICAL CARE, AND HEATING?: NOT HARD AT ALL

## 2022-02-27 SDOH — HEALTH STABILITY: PHYSICAL HEALTH: ON AVERAGE, HOW MANY MINUTES DO YOU ENGAGE IN EXERCISE AT THIS LEVEL?: 30 MIN

## 2022-02-27 SDOH — ECONOMIC STABILITY: FOOD INSECURITY: WITHIN THE PAST 12 MONTHS, YOU WORRIED THAT YOUR FOOD WOULD RUN OUT BEFORE YOU GOT MONEY TO BUY MORE.: NEVER TRUE

## 2022-02-27 SDOH — ECONOMIC STABILITY: TRANSPORTATION INSECURITY
IN THE PAST 12 MONTHS, HAS THE LACK OF TRANSPORTATION KEPT YOU FROM MEDICAL APPOINTMENTS OR FROM GETTING MEDICATIONS?: NO

## 2022-02-27 SDOH — ECONOMIC STABILITY: INCOME INSECURITY: IN THE LAST 12 MONTHS, WAS THERE A TIME WHEN YOU WERE NOT ABLE TO PAY THE MORTGAGE OR RENT ON TIME?: NO

## 2022-02-27 SDOH — ECONOMIC STABILITY: HOUSING INSECURITY: IN THE LAST 12 MONTHS, HOW MANY PLACES HAVE YOU LIVED?: 1

## 2022-02-27 SDOH — ECONOMIC STABILITY: TRANSPORTATION INSECURITY
IN THE PAST 12 MONTHS, HAS LACK OF TRANSPORTATION KEPT YOU FROM MEETINGS, WORK, OR FROM GETTING THINGS NEEDED FOR DAILY LIVING?: NO

## 2022-02-27 SDOH — HEALTH STABILITY: MENTAL HEALTH
STRESS IS WHEN SOMEONE FEELS TENSE, NERVOUS, ANXIOUS, OR CAN'T SLEEP AT NIGHT BECAUSE THEIR MIND IS TROUBLED. HOW STRESSED ARE YOU?: VERY MUCH

## 2022-02-27 SDOH — ECONOMIC STABILITY: TRANSPORTATION INSECURITY
IN THE PAST 12 MONTHS, HAS LACK OF RELIABLE TRANSPORTATION KEPT YOU FROM MEDICAL APPOINTMENTS, MEETINGS, WORK OR FROM GETTING THINGS NEEDED FOR DAILY LIVING?: NO

## 2022-02-27 ASSESSMENT — SOCIAL DETERMINANTS OF HEALTH (SDOH)
HOW OFTEN DO YOU HAVE SIX OR MORE DRINKS ON ONE OCCASION: NEVER
HOW OFTEN DO YOU GET TOGETHER WITH FRIENDS OR RELATIVES?: THREE TIMES A WEEK
HOW OFTEN DO YOU ATTENT MEETINGS OF THE CLUB OR ORGANIZATION YOU BELONG TO?: MORE THAN 4 TIMES PER YEAR
IN A TYPICAL WEEK, HOW MANY TIMES DO YOU TALK ON THE PHONE WITH FAMILY, FRIENDS, OR NEIGHBORS?: ONCE A WEEK
HOW HARD IS IT FOR YOU TO PAY FOR THE VERY BASICS LIKE FOOD, HOUSING, MEDICAL CARE, AND HEATING?: NOT HARD AT ALL
DO YOU BELONG TO ANY CLUBS OR ORGANIZATIONS SUCH AS CHURCH GROUPS UNIONS, FRATERNAL OR ATHLETIC GROUPS, OR SCHOOL GROUPS?: YES
HOW OFTEN DO YOU ATTEND CHURCH OR RELIGIOUS SERVICES?: PATIENT DECLINED
HOW OFTEN DO YOU GET TOGETHER WITH FRIENDS OR RELATIVES?: THREE TIMES A WEEK
DO YOU BELONG TO ANY CLUBS OR ORGANIZATIONS SUCH AS CHURCH GROUPS UNIONS, FRATERNAL OR ATHLETIC GROUPS, OR SCHOOL GROUPS?: YES
IN A TYPICAL WEEK, HOW MANY TIMES DO YOU TALK ON THE PHONE WITH FAMILY, FRIENDS, OR NEIGHBORS?: ONCE A WEEK
HOW OFTEN DO YOU ATTEND CHURCH OR RELIGIOUS SERVICES?: PATIENT DECLINED
HOW MANY DRINKS CONTAINING ALCOHOL DO YOU HAVE ON A TYPICAL DAY WHEN YOU ARE DRINKING: 1 OR 2
WITHIN THE PAST 12 MONTHS, YOU WORRIED THAT YOUR FOOD WOULD RUN OUT BEFORE YOU GOT THE MONEY TO BUY MORE: NEVER TRUE
HOW OFTEN DO YOU ATTENT MEETINGS OF THE CLUB OR ORGANIZATION YOU BELONG TO?: MORE THAN 4 TIMES PER YEAR
HOW OFTEN DO YOU HAVE A DRINK CONTAINING ALCOHOL: MONTHLY OR LESS

## 2022-02-27 ASSESSMENT — LIFESTYLE VARIABLES
HOW MANY STANDARD DRINKS CONTAINING ALCOHOL DO YOU HAVE ON A TYPICAL DAY: 1 OR 2
HOW OFTEN DO YOU HAVE A DRINK CONTAINING ALCOHOL: MONTHLY OR LESS
HOW OFTEN DO YOU HAVE SIX OR MORE DRINKS ON ONE OCCASION: NEVER

## 2022-03-02 ENCOUNTER — OFFICE VISIT (OUTPATIENT)
Dept: MEDICAL GROUP | Facility: MEDICAL CENTER | Age: 57
End: 2022-03-02
Payer: COMMERCIAL

## 2022-03-02 VITALS
DIASTOLIC BLOOD PRESSURE: 74 MMHG | TEMPERATURE: 97.5 F | OXYGEN SATURATION: 99 % | WEIGHT: 116.51 LBS | SYSTOLIC BLOOD PRESSURE: 122 MMHG | BODY MASS INDEX: 17.66 KG/M2 | HEIGHT: 68 IN | RESPIRATION RATE: 18 BRPM | HEART RATE: 65 BPM

## 2022-03-02 DIAGNOSIS — R53.82 CHRONIC FATIGUE: ICD-10-CM

## 2022-03-02 DIAGNOSIS — Z00.00 HEALTHCARE MAINTENANCE: ICD-10-CM

## 2022-03-02 DIAGNOSIS — E78.5 DYSLIPIDEMIA (HIGH LDL; LOW HDL): ICD-10-CM

## 2022-03-02 DIAGNOSIS — R73.01 IMPAIRED FASTING GLUCOSE: ICD-10-CM

## 2022-03-02 DIAGNOSIS — G47.00 INSOMNIA, UNSPECIFIED TYPE: ICD-10-CM

## 2022-03-02 PROCEDURE — 99214 OFFICE O/P EST MOD 30 MIN: CPT | Performed by: INTERNAL MEDICINE

## 2022-03-02 ASSESSMENT — ENCOUNTER SYMPTOMS
WEIGHT LOSS: 1
ABDOMINAL PAIN: 0
FEVER: 0
DEPRESSION: 1
VOMITING: 0
FOCAL WEAKNESS: 0
INSOMNIA: 1
CHILLS: 0
MYALGIAS: 0
COUGH: 0
SHORTNESS OF BREATH: 0
NAUSEA: 0
HEARTBURN: 0
PALPITATIONS: 0
SORE THROAT: 0

## 2022-03-02 ASSESSMENT — PATIENT HEALTH QUESTIONNAIRE - PHQ9: CLINICAL INTERPRETATION OF PHQ2 SCORE: 0

## 2022-03-02 ASSESSMENT — FIBROSIS 4 INDEX: FIB4 SCORE: 1.386750490563072805

## 2022-03-02 NOTE — ASSESSMENT & PLAN NOTE
He was never formally evaluated with a sleep study, advised patient to cut back on caffeine, will refer to sleep medicine.

## 2022-03-02 NOTE — PATIENT INSTRUCTIONS
Insomnia  Insomnia is a sleep disorder that makes it difficult to fall asleep or stay asleep. Insomnia can cause fatigue, low energy, difficulty concentrating, mood swings, and poor performance at work or school.  There are three different ways to classify insomnia:  · Difficulty falling asleep.  · Difficulty staying asleep.  · Waking up too early in the morning.  Any type of insomnia can be long-term (chronic) or short-term (acute). Both are common. Short-term insomnia usually lasts for three months or less. Chronic insomnia occurs at least three times a week for longer than three months.  What are the causes?  Insomnia may be caused by another condition, situation, or substance, such as:  · Anxiety.  · Certain medicines.  · Gastroesophageal reflux disease (GERD) or other gastrointestinal conditions.  · Asthma or other breathing conditions.  · Restless legs syndrome, sleep apnea, or other sleep disorders.  · Chronic pain.  · Menopause.  · Stroke.  · Abuse of alcohol, tobacco, or illegal drugs.  · Mental health conditions, such as depression.  · Caffeine.  · Neurological disorders, such as Alzheimer's disease.  · An overactive thyroid (hyperthyroidism).  Sometimes, the cause of insomnia may not be known.  What increases the risk?  Risk factors for insomnia include:  · Gender. Women are affected more often than men.  · Age. Insomnia is more common as you get older.  · Stress.  · Lack of exercise.  · Irregular work schedule or working night shifts.  · Traveling between different time zones.  · Certain medical and mental health conditions.  What are the signs or symptoms?  If you have insomnia, the main symptom is having trouble falling asleep or having trouble staying asleep. This may lead to other symptoms, such as:  · Feeling fatigued or having low energy.  · Feeling nervous about going to sleep.  · Not feeling rested in the morning.  · Having trouble concentrating.  · Feeling irritable, anxious, or depressed.  How  is this diagnosed?  This condition may be diagnosed based on:  · Your symptoms and medical history. Your health care provider may ask about:  ? Your sleep habits.  ? Any medical conditions you have.  ? Your mental health.  · A physical exam.  How is this treated?  Treatment for insomnia depends on the cause. Treatment may focus on treating an underlying condition that is causing insomnia. Treatment may also include:  · Medicines to help you sleep.  · Counseling or therapy.  · Lifestyle adjustments to help you sleep better.  Follow these instructions at home:  Eating and drinking    · Limit or avoid alcohol, caffeinated beverages, and cigarettes, especially close to bedtime. These can disrupt your sleep.  · Do not eat a large meal or eat spicy foods right before bedtime. This can lead to digestive discomfort that can make it hard for you to sleep.  Sleep habits    · Keep a sleep diary to help you and your health care provider figure out what could be causing your insomnia. Write down:  ? When you sleep.  ? When you wake up during the night.  ? How well you sleep.  ? How rested you feel the next day.  ? Any side effects of medicines you are taking.  ? What you eat and drink.  · Make your bedroom a dark, comfortable place where it is easy to fall asleep.  ? Put up shades or blackout curtains to block light from outside.  ? Use a white noise machine to block noise.  ? Keep the temperature cool.  · Limit screen use before bedtime. This includes:  ? Watching TV.  ? Using your smartphone, tablet, or computer.  · Stick to a routine that includes going to bed and waking up at the same times every day and night. This can help you fall asleep faster. Consider making a quiet activity, such as reading, part of your nighttime routine.  · Try to avoid taking naps during the day so that you sleep better at night.  · Get out of bed if you are still awake after 15 minutes of trying to sleep. Keep the lights down, but try reading or  doing a quiet activity. When you feel sleepy, go back to bed.  General instructions  · Take over-the-counter and prescription medicines only as told by your health care provider.  · Exercise regularly, as told by your health care provider. Avoid exercise starting several hours before bedtime.  · Use relaxation techniques to manage stress. Ask your health care provider to suggest some techniques that may work well for you. These may include:  ? Breathing exercises.  ? Routines to release muscle tension.  ? Visualizing peaceful scenes.  · Make sure that you drive carefully. Avoid driving if you feel very sleepy.  · Keep all follow-up visits as told by your health care provider. This is important.  Contact a health care provider if:  · You are tired throughout the day.  · You have trouble in your daily routine due to sleepiness.  · You continue to have sleep problems, or your sleep problems get worse.  Get help right away if:  · You have serious thoughts about hurting yourself or someone else.  If you ever feel like you may hurt yourself or others, or have thoughts about taking your own life, get help right away. You can go to your nearest emergency department or call:  · Your local emergency services (911 in the [place]  · A suicide crisis helpline, such as the National Suicide Prevention Lifeline at 1-258.134.6711. This is open 24 hours a day.  Summary  · Insomnia is a sleep disorder that makes it difficult to fall asleep or stay asleep.  · Insomnia can be long-term (chronic) or short-term (acute).  · Treatment for insomnia depends on the cause. Treatment may focus on treating an underlying condition that is causing insomnia.  · Keep a sleep diary to help you and your health care provider figure out what could be causing your insomnia.  This information is not intended to replace advice given to you by your health care provider. Make sure you discuss any questions you have with your health care provider.  Document  Released: 12/15/2001 Document Revised: 11/30/2018 Document Reviewed: 09/27/2018  [employer]vier Patient Education © 2020 [employer]

## 2022-03-02 NOTE — PROGRESS NOTES
Subjective:     Chief Complaint   Patient presents with   • Establish Care   • Requesting Labs      Diagnoses of Dyslipidemia (high LDL; low HDL), IFG (impaired fasting glucose), Healthcare maintenance, Chronic fatigue, and Insomnia, unspecified type were pertinent to this visit.    HISTORY OF THE PRESENT ILLNESS: Patient is a 56 y.o. female. This pleasant patient is here today to establish care and discuss her chronic fatigue.. Her prior PCP was Dr. Sheppard and other physicians from [facility].    Problem   Chronic Fatigue    .Onset 2.5 years ago, patient started experiencing fatigue, hair loss that initially was attributed to menopause her gynecologist started hormone replacement therapy with estradiol patch and progesterone oral.  It initially helped with her symptoms, however they became progressively worse.  Patient is also under care of integrative medicine physician, who prescribed her [place] Thyroid, even though her hormone levels were within normal limits.  Patient states that she is drinking approximately 1 pot of coffee every day to help her stay awake.  Her diet consists of morning oatmeal, almond butter sandwich, smoothly, salmon for dinner.  Patient states that she does not follow a vegetarian diet, but does not eat lots of meat.  She states she eats a lot, however she is still losing weight.  Her current  BMI 17.72 it appears that she has lost 3 pounds since November.  She is fairly active, does yoga twice a week, walking dogs and spending time with her daughter.  She also states that she has serious difficulties with her sleep: able to fall asleep, however she would wake up at around midnight or 1 AM and would not be able to fall back asleep.  She has never had a formal evaluation with a sleep study.            Dyslipidemia (High Ldl; Low Hdl)      Lab Results   Component Value Date/Time    CHOLSTRLTOT 196 05/22/2021 02:02 AM    TRIGLYCERIDE 60 05/22/2021 02:02 AM    HDL 82 05/22/2021 02:02  AM     (H) 05/22/2021 02:02 AM   Patient is not on any medication, medication, healthy diet.  Interested in repeat cholesterol panel.  Patient has no chest pain, shortness of breath on exertion or other cardiopulmonary symptoms or strokelike symptoms.      Insomnia    This is a chronic ongoing issue, patient is never able to get any restorative sleep.  She takes up to 20 mg of melatonin to help her fall asleep, however she is not able to stay asleep.  She wakes up approximately at 1 AM and generally not able to fall back asleep.  She feels extremely tired in the morning and 10-12 cups off coffee, still feeling extremely fatigued.              Past Medical History:   Diagnosis Date   • Herpes genitalia      Past Surgical History:   Procedure Laterality Date   • HYSTEROSCOPY NOVASURE-2 N/A 8/27/2018    Procedure: HYSTEROSCOPY NOVASURE;  Surgeon: Amira Dubois M.D.;  Location: SURGERY SAME DAY [facility];  Service: Gynecology   • DILATION AND CURETTAGE N/A 8/27/2018    Procedure: DILATION AND CURETTAGE;  Surgeon: Amira Dubois M.D.;  Location: SURGERY SAME DAY [facility];  Service: Gynecology   • APPENDECTOMY      emergent     Family History   Problem Relation Age of Onset   • Cancer Mother 50        breast   • Dementia Mother    • Diabetes Father    • Other Father         glioblastoma   • Thyroid Father    • Cancer Father         skin cancer   • Heart Attack Father    • Diabetes Maternal Grandmother    • No Known Problems Daughter      Social History     Tobacco Use   • Smoking status: Never Smoker   • Smokeless tobacco: Never Used   Vaping Use   • Vaping Use: Never used   Substance Use Topics   • Alcohol use: No   • Drug use: No     Current Outpatient Medications Ordered in Epic   Medication Sig Dispense Refill   • progesterone (PROMETRIUM) 100 MG Cap      • thyroid (ARMOUR THYROID) 60 MG Tab Take 60 mg by mouth every day.     • MELATONIN PO Take 1 tablet by mouth at bedtime as needed.     •  "Multiple Vitamins-Minerals (MULTIVITAMIN ADULT PO) Take 1 tablet by mouth every day.     • vitamin D (CHOLECALCIFEROL) 1000 UNIT Tab Take 1,000 Units by mouth every day.       No current [facility]-ordered facility-administered medications on file.     Health Maintenance: Deferred to next visit    Review of Systems   Constitutional: Positive for malaise/fatigue and weight loss. Negative for chills and fever.   HENT: Negative for sore throat.    Respiratory: Negative for cough and shortness of breath.    Cardiovascular: Negative for chest pain and palpitations.   Gastrointestinal: Negative for abdominal pain, heartburn, nausea and vomiting.   Musculoskeletal: Negative for myalgias.   Neurological: Negative for focal weakness.   Psychiatric/Behavioral: Positive for depression. Negative for suicidal ideas. The patient has insomnia.       Objective:     Exam: /74 (BP Location: Right arm, Patient Position: Sitting, BP Cuff Size: Adult)   Pulse 65   Temp 36.4 °C (97.5 °F) (Temporal)   Resp 18   Ht 1.727 m (5' 8\")   Wt 52.9 kg (116 lb 8.2 oz)   SpO2 99%  Body mass index is 17.72 kg/m².    Physical Exam  Constitutional:       Comments: underweight   HENT:      Head: Normocephalic and atraumatic.      Nose: Nose normal. No congestion.      Mouth/Throat:      Mouth: Mucous membranes are moist.      Pharynx: Oropharynx is clear. No oropharyngeal exudate.   Eyes:      General: No scleral icterus.  Cardiovascular:      Rate and Rhythm: Normal rate and regular rhythm.      Pulses: Normal pulses.      Heart sounds: Normal heart sounds. No murmur heard.    No gallop.   Pulmonary:      Effort: Pulmonary effort is normal. No respiratory distress.      Breath sounds: Normal breath sounds. No stridor.   Abdominal:      General: Abdomen is flat.   Musculoskeletal:      Right lower leg: No edema.      Left lower leg: No edema.   Skin:     General: Skin is warm.      Capillary Refill: Capillary refill takes less than 2 seconds.      " Comments: Cold fingers   Neurological:      Mental Status: She is alert and oriented to person, place, and time.      Cranial Nerves: No cranial nerve deficit.      Gait: Gait normal.   Psychiatric:         Mood and Affect: Mood normal.         Behavior: Behavior normal.         Thought Content: Thought content normal.         Judgment: Judgment normal.       Labs: Reviewed results of iron levels, ferritin, CBC, vitamin B12, vitamin D, TSH, CMP from February 1, 2022.    Assessment & Plan:   56 y.o. female with the following -    Problem List Items Addressed This Visit     Chronic fatigue     This is an ongoing and complex issue.  Patient has been evaluated by multiple specialists including integrative medicine, endocrinology-all endocrinologic causes for fatigue were ruled out, nutritional deficiencies were ruled out as well.  Celiac disease, rheumatologic work-up was negative.  Patient is taking thyroid [place] despite normal thyroid hormone levels-advised against taking it.  Patient is also receiving vitamin B12 shots, her vitamin D is 3 times above normal limits.  Have low suspicion for endocrinologic, rheumatologic condition at this time, her chronic fatigue may be related to insomnia.  Patient would benefit from a sleep medicine evaluation.         Relevant Orders    Referral to Pulmonary and Sleep Medicine    Dyslipidemia (high LDL; low HDL)     Continue healthy diet and exercise, repeat lipid panel.         Relevant Orders    Lipid Profile            Insomnia     She was never formally evaluated with a sleep study, advised patient to cut back on caffeine, will refer to sleep medicine.         Relevant Orders    Referral to Pulmonary and Sleep Medicine        Return in about 2 months (around 5/2/2022).    Please note that this dictation was created using voice recognition software. I have made every reasonable attempt to correct obvious errors, but I expect that there are errors of grammar and possibly content  that I did not discover before finalizing the note.

## 2022-03-02 NOTE — ASSESSMENT & PLAN NOTE
This is an ongoing and complex issue.  Patient has been evaluated by multiple specialists including integrative medicine, endocrinology-all endocrinologic causes for fatigue were ruled out, nutritional deficiencies were ruled out as well.  Celiac disease, rheumatologic work-up was negative.  Patient is taking thyroid [place] despite normal thyroid hormone levels-advised against taking it.  Patient is also receiving vitamin B12 shots, her vitamin D is 3 times above normal limits.  Have low suspicion for endocrinologic, rheumatologic condition at this time, her chronic fatigue may be related to insomnia.  Patient would benefit from a sleep medicine evaluation.

## 2022-03-14 ENCOUNTER — HOSPITAL ENCOUNTER (OUTPATIENT)
Dept: LAB | Facility: MEDICAL CENTER | Age: 57
End: 2022-03-14
Attending: INTERNAL MEDICINE
Payer: COMMERCIAL

## 2022-03-14 DIAGNOSIS — E78.5 DYSLIPIDEMIA (HIGH LDL; LOW HDL): ICD-10-CM

## 2022-03-14 LAB
CHOLEST SERPL-MCNC: 221 MG/DL (ref 100–199)
FASTING STATUS PATIENT QL REPORTED: NORMAL
HDLC SERPL-MCNC: 100 MG/DL
LDLC SERPL CALC-MCNC: 109 MG/DL
TRIGL SERPL-MCNC: 61 MG/DL (ref 0–149)

## 2022-03-14 PROCEDURE — 80061 LIPID PANEL: CPT

## 2022-03-14 PROCEDURE — 36415 COLL VENOUS BLD VENIPUNCTURE: CPT

## 2022-07-21 ENCOUNTER — APPOINTMENT (RX ONLY)
Dept: URBAN - METROPOLITAN AREA CLINIC 35 | Facility: CLINIC | Age: 57
Setting detail: DERMATOLOGY
End: 2022-07-21

## 2022-07-21 DIAGNOSIS — L81.4 OTHER MELANIN HYPERPIGMENTATION: ICD-10-CM

## 2022-07-21 DIAGNOSIS — L82.1 OTHER SEBORRHEIC KERATOSIS: ICD-10-CM

## 2022-07-21 DIAGNOSIS — D22 MELANOCYTIC NEVI: ICD-10-CM

## 2022-07-21 DIAGNOSIS — Z71.89 OTHER SPECIFIED COUNSELING: ICD-10-CM

## 2022-07-21 PROBLEM — D22.4 MELANOCYTIC NEVI OF SCALP AND NECK: Status: ACTIVE | Noted: 2022-07-21

## 2022-07-21 PROCEDURE — ? COUNSELING

## 2022-07-21 PROCEDURE — 99213 OFFICE O/P EST LOW 20 MIN: CPT

## 2022-07-21 ASSESSMENT — LOCATION SIMPLE DESCRIPTION DERM
LOCATION SIMPLE: SCALP
LOCATION SIMPLE: POSTERIOR NECK
LOCATION SIMPLE: RIGHT SHOULDER
LOCATION SIMPLE: RIGHT UPPER BACK

## 2022-07-21 ASSESSMENT — LOCATION DETAILED DESCRIPTION DERM
LOCATION DETAILED: RIGHT SUPERIOR UPPER BACK
LOCATION DETAILED: RIGHT POSTERIOR SHOULDER
LOCATION DETAILED: RIGHT INFERIOR POSTERIOR NECK
LOCATION DETAILED: LEFT SUPERIOR PARIETAL SCALP
LOCATION DETAILED: RIGHT MEDIAL TRAPEZIAL NECK

## 2022-07-21 ASSESSMENT — LOCATION ZONE DERM
LOCATION ZONE: TRUNK
LOCATION ZONE: NECK
LOCATION ZONE: SCALP
LOCATION ZONE: ARM

## 2022-10-12 ENCOUNTER — APPOINTMENT (RX ONLY)
Dept: URBAN - METROPOLITAN AREA CLINIC 35 | Facility: CLINIC | Age: 57
Setting detail: DERMATOLOGY
End: 2022-10-12

## 2022-10-12 DIAGNOSIS — L82.0 INFLAMED SEBORRHEIC KERATOSIS: ICD-10-CM

## 2022-10-12 PROCEDURE — 99212 OFFICE O/P EST SF 10 MIN: CPT

## 2022-10-12 PROCEDURE — ? COUNSELING

## 2022-10-12 ASSESSMENT — LOCATION SIMPLE DESCRIPTION DERM: LOCATION SIMPLE: LEFT POSTERIOR UPPER ARM

## 2022-10-12 ASSESSMENT — LOCATION ZONE DERM: LOCATION ZONE: ARM

## 2022-10-12 ASSESSMENT — LOCATION DETAILED DESCRIPTION DERM: LOCATION DETAILED: LEFT PROXIMAL POSTERIOR UPPER ARM

## 2022-10-19 ENCOUNTER — HOSPITAL ENCOUNTER (OUTPATIENT)
Dept: LAB | Facility: MEDICAL CENTER | Age: 57
End: 2022-10-19
Attending: NURSE PRACTITIONER
Payer: COMMERCIAL

## 2022-10-19 LAB
ESTRADIOL SERPL-MCNC: 36.4 PG/ML
FSH SERPL-ACNC: 44.4 MIU/ML
LH SERPL-ACNC: 29.8 IU/L

## 2022-10-19 PROCEDURE — 82670 ASSAY OF TOTAL ESTRADIOL: CPT

## 2022-10-19 PROCEDURE — 36415 COLL VENOUS BLD VENIPUNCTURE: CPT

## 2022-10-19 PROCEDURE — 83002 ASSAY OF GONADOTROPIN (LH): CPT

## 2022-10-19 PROCEDURE — 83001 ASSAY OF GONADOTROPIN (FSH): CPT

## 2023-01-19 ENCOUNTER — OFFICE VISIT (OUTPATIENT)
Dept: MEDICAL GROUP | Facility: MEDICAL CENTER | Age: 58
End: 2023-01-19
Payer: COMMERCIAL

## 2023-01-19 VITALS
HEIGHT: 68 IN | OXYGEN SATURATION: 100 % | TEMPERATURE: 97.5 F | SYSTOLIC BLOOD PRESSURE: 98 MMHG | DIASTOLIC BLOOD PRESSURE: 62 MMHG | WEIGHT: 127.54 LBS | HEART RATE: 65 BPM | BODY MASS INDEX: 19.33 KG/M2

## 2023-01-19 DIAGNOSIS — E55.9 VITAMIN D DEFICIENCY: ICD-10-CM

## 2023-01-19 DIAGNOSIS — R53.82 CHRONIC FATIGUE: ICD-10-CM

## 2023-01-19 DIAGNOSIS — E03.9 HYPOTHYROIDISM, UNSPECIFIED TYPE: ICD-10-CM

## 2023-01-19 DIAGNOSIS — R63.5 WEIGHT GAIN: ICD-10-CM

## 2023-01-19 DIAGNOSIS — Z79.890 HORMONE REPLACEMENT THERAPY (HRT): ICD-10-CM

## 2023-01-19 DIAGNOSIS — Z12.31 ENCOUNTER FOR SCREENING MAMMOGRAM FOR BREAST CANCER: ICD-10-CM

## 2023-01-19 DIAGNOSIS — Z00.00 HEALTHCARE MAINTENANCE: ICD-10-CM

## 2023-01-19 PROCEDURE — 99396 PREV VISIT EST AGE 40-64: CPT | Performed by: INTERNAL MEDICINE

## 2023-01-19 PROCEDURE — 99214 OFFICE O/P EST MOD 30 MIN: CPT | Mod: 25 | Performed by: INTERNAL MEDICINE

## 2023-01-19 RX ORDER — ALBUTEROL SULFATE 90 UG/1
AEROSOL, METERED RESPIRATORY (INHALATION)
COMMUNITY
End: 2023-01-19

## 2023-01-19 RX ORDER — SCOLOPAMINE TRANSDERMAL SYSTEM 1 MG/1
PATCH, EXTENDED RELEASE TRANSDERMAL
COMMUNITY
End: 2023-06-29

## 2023-01-19 RX ORDER — ESTRADIOL 0.04 MG/D
PATCH TRANSDERMAL
COMMUNITY
Start: 2022-11-25 | End: 2023-10-16

## 2023-01-19 RX ORDER — ESTRADIOL 0.04 MG/D
FILM, EXTENDED RELEASE TRANSDERMAL
COMMUNITY
Start: 2023-01-10

## 2023-01-19 ASSESSMENT — ENCOUNTER SYMPTOMS
HEARTBURN: 0
COUGH: 0
FEVER: 0
DEPRESSION: 1
INSOMNIA: 1
ABDOMINAL PAIN: 0
WEIGHT LOSS: 0
VOMITING: 0
SHORTNESS OF BREATH: 0
CHILLS: 0
SORE THROAT: 0
MYALGIAS: 0
NAUSEA: 0
FOCAL WEAKNESS: 0
PALPITATIONS: 0

## 2023-01-19 ASSESSMENT — PATIENT HEALTH QUESTIONNAIRE - PHQ9
5. POOR APPETITE OR OVEREATING: 3 - NEARLY EVERY DAY
CLINICAL INTERPRETATION OF PHQ2 SCORE: 3
SUM OF ALL RESPONSES TO PHQ QUESTIONS 1-9: 13

## 2023-01-19 ASSESSMENT — FIBROSIS 4 INDEX: FIB4 SCORE: 1.411964135846037765

## 2023-01-19 ASSESSMENT — ANXIETY QUESTIONNAIRES
7. FEELING AFRAID AS IF SOMETHING AWFUL MIGHT HAPPEN: NEARLY EVERY DAY
GAD7 TOTAL SCORE: 20
2. NOT BEING ABLE TO STOP OR CONTROL WORRYING: NEARLY EVERY DAY
3. WORRYING TOO MUCH ABOUT DIFFERENT THINGS: NEARLY EVERY DAY
4. TROUBLE RELAXING: NEARLY EVERY DAY
5. BEING SO RESTLESS THAT IT IS HARD TO SIT STILL: NEARLY EVERY DAY
6. BECOMING EASILY ANNOYED OR IRRITABLE: MORE THAN HALF THE DAYS
1. FEELING NERVOUS, ANXIOUS, OR ON EDGE: NEARLY EVERY DAY

## 2023-01-19 NOTE — PROGRESS NOTES
Subjective:     HPI:   Sydney Mckeon is a 57 y.o. female who presents for annual exam.  Has multiple complaints: With gain, chronic fatigue, hair loss and dry skin.  She is aware, that this visit will be double billed and she agrees to proceed.    Ob-Gyn/ History:    Patient has GYN provider: [facility] in [place]   /Para:   Last Pap Smear:  3 mo ago  NO history of abnormal pap smears.  Gyn Surgery:  2018   Current Contraceptive Method: none, she is currently sexually active.  No significant bloating/fluid retention, pelvic pain, or dyspareunia. No vaginal discharge    Health Maintenance    Cholesterol Screening: 3/24/2022  Diabetes Screening: BG WNL 2022   Diet: cook at home, dairy and gluten free.  Exercise: Not able to exercise due to fatigue, yoga   Substance Abuse: no  Safe in relationship. yes  Seat belts, bike helmet, gun safety discussed.  Sun protection used.    Cancer screening:   Colorectal Cancer Screening: Requested colonoscopy results  Cervical Cancer Screening: 3 mo ago with GYN   Breast Cancer Screening: Due for mammogram, ordered.    Infectious disease screening/Immunizations  --STI Screening: Not interested  --Practices safe sex.  --HIV Screening: Not interested  --Hepatitis C Screening: Not interested  --Immunizations:    Tetanus: 2019    Shingles: Advised to get 2nd dose at the pharmacy.   COVID-19: x4   Patient was interested hepatitis B vaccine, will administer the next appointment    She  has a past medical history of Anxiety, Arthritis, Depression, and Herpes genitalia.  She  has a past surgical history that includes appendectomy; hysteroscopy novasure-2 (N/A, 2018); and dilation and curettage (N/A, 2018).    Family History   Problem Relation Age of Onset    Cancer Mother         bladder    Dementia Mother     Diabetes Father     Other Father         glioblastoma    Thyroid Father     Cancer Father         gioblastoma    Heart Attack Father     Diabetes  Maternal Grandmother     No Known Problems Daughter      Social History     Socioeconomic History    Marital status:      Spouse name: Not on file    Number of children: Not on file    Years of education: Not on file    Highest education level: Master's degree (e.g., MA, MS, [place], MEd, MSW, MAVERICK)   Occupational History    Not on file   Tobacco Use    Smoking status: Never    Smokeless tobacco: Never   Vaping Use    Vaping Use: Never used   Substance and Sexual Activity    Alcohol use: No    Drug use: No    Sexual activity: Yes     Partners: Male   Other Topics Concern    Not on file   Social History Narrative    Not on file     Social Determinants of Health     Financial Resource Strain: Low Risk     Difficulty of Paying Living Expenses: Not hard at all   Food Insecurity: No Food Insecurity    Worried About Running Out of Food in the Last Year: Never true    Ran Out of Food in the Last Year: Never true   Transportation Needs: No Transportation Needs    Lack of Transportation (Medical): No    Lack of Transportation (Non-Medical): No   Physical Activity: Insufficiently Active    Days of Exercise per Week: 4 days    Minutes of Exercise per Session: 30 min   Stress: Stress Concern Present    Feeling of Stress : Very much   Social Connections: Unknown    Frequency of Communication with Friends and Family: Once a week    Frequency of Social Gatherings with Friends and Family: Three times a week    Attends [employer]ology Services: Patient refused    Active Member of Clubs or Organizations: Yes    Attends Club or Organization Meetings: More than 4 times per year    Marital Status:    Intimate Partner Violence: Not on file   Housing Stability: Low Risk     Unable to Pay for Housing in the Last Year: No    Number of Places Lived in the Last Year: 1    Unstable Housing in the Last Year: No     Patient Active Problem List    Diagnosis Date Noted    Hormone replacement therapy (HRT) 01/19/2023    Chronic fatigue  "03/02/2022    Acquired mechanical ptosis of right eyelid 05/21/2021    Weight gain 05/21/2021    History of endometrial ablation 05/21/2021    Malaise 05/21/2021    Menopause 10/23/2020    Weight loss 10/02/2020    IFG (impaired fasting glucose) 11/14/2019    Dyslipidemia (high LDL; low HDL) 11/14/2019    Genital herpes simplex 04/16/2018    Family history of melanoma 04/16/2018    Vitamin D deficiency 04/16/2018    Insomnia 04/16/2018    Healthcare maintenance 04/16/2018       Current Outpatient Medications   Medication Sig Dispense Refill    estradiol (VIVELLE) 0.0375 MG/24HR patch APPLY 1 PATCH TRANSDERMAL EVERY 3 1/2 DAYS      scopolamine (TRANSDERM-SCOP) 1 mg/72hr PATCH 72 HR scopolamine 1 mg over 3 days transdermal patch   APPLY ONE PATCH BEHIND THE EAR EVERY 72 HOURS      progesterone (PROMETRIUM) 100 MG Cap       Multiple Vitamins-Minerals (MULTIVITAMIN ADULT PO) Take 1 tablet by mouth every day.      vitamin D (CHOLECALCIFEROL) 1000 UNIT Tab Take 1,000 Units by mouth every day.      estradiol (CLIMARA) 0.0375 MG/24HR patch  (Patient not taking: Reported on 1/19/2023)      MELATONIN PO Take 1 tablet by mouth at bedtime as needed.       No current facility-administered medications for this visit.     Allergies   Allergen Reactions    Sulfa Drugs Rash     Rash and shortness of breath    Vicodin [Hydrocodone-Acetaminophen] Itching     \"bugs crawling on me\"     Review of Systems   Constitutional:  Positive for malaise/fatigue. Negative for chills, fever and weight loss.   HENT:  Negative for sore throat.    Respiratory:  Negative for cough and shortness of breath.    Cardiovascular:  Negative for chest pain and palpitations.   Gastrointestinal:  Negative for abdominal pain, heartburn, nausea and vomiting.   Musculoskeletal:  Negative for myalgias.   Neurological:  Negative for focal weakness.   Psychiatric/Behavioral:  Positive for depression. Negative for suicidal ideas. The patient has insomnia.  " "    Objective:     BP 98/62 (BP Location: Left arm, Patient Position: Sitting, BP Cuff Size: Adult)   Pulse 65   Temp 36.4 °C (97.5 °F) (Temporal)   Ht 1.727 m (5' 8\")   Wt 57.9 kg (127 lb 8.6 oz)   SpO2 100%   BMI 19.39 kg/m²   Body mass index is 19.39 kg/m².  Wt Readings from Last 4 Encounters:   01/19/23 57.9 kg (127 lb 8.6 oz)   03/02/22 52.9 kg (116 lb 8.2 oz)   11/17/21 54 kg (119 lb)   07/14/21 50.8 kg (112 lb)     Physical Exam  Constitutional:       General: She is not in acute distress.     Appearance: Normal appearance. She is not toxic-appearing.   HENT:      Head: Normocephalic and atraumatic.      Nose: Nose normal. No congestion.      Mouth/Throat:      Mouth: Mucous membranes are moist.      Pharynx: Oropharynx is clear. No oropharyngeal exudate.   Eyes:      General: No scleral icterus.  Cardiovascular:      Rate and Rhythm: Normal rate and regular rhythm.      Pulses: Normal pulses.      Heart sounds: Normal heart sounds. No murmur heard.    No gallop.   Pulmonary:      Effort: Pulmonary effort is normal. No respiratory distress.      Breath sounds: Normal breath sounds. No stridor.   Abdominal:      General: Abdomen is flat. There is no distension.      Palpations: Abdomen is soft.      Tenderness: There is no abdominal tenderness. There is no guarding.   Musculoskeletal:         General: Normal range of motion.      Right lower leg: No edema.      Left lower leg: No edema.   Skin:     General: Skin is dry.      Capillary Refill: Capillary refill takes less than 2 seconds.   Neurological:      Mental Status: She is alert and oriented to person, place, and time.      Cranial Nerves: No cranial nerve deficit.      Gait: Gait normal.   Psychiatric:         Mood and Affect: Mood normal.         Behavior: Behavior normal.     Assessment and Plan:     Problem List Items Addressed This Visit       Vitamin D deficiency (Chronic)     Chronic and stable, currently taking vitamin D 3 supplementation " daily.   -Vitamin D3 level         Relevant Orders    VITAMIN D,25 HYDROXY (DEFICIENCY)    Chronic fatigue     This is a chronic problem, onset over 3 years ago.  Patient has had extensive work-up completed by endocrinologist, she was seen integrative medicine physician who was prescribing her [place] Thyroid.  In the last appointment in March I have referred her to sleep medicine specialist.    Differentials include chronic fatigue syndrome, hypothyroidism, dietary deficiencies of vitamin D, vitamin B12.    Obtain levels as above.    PHQ-9 was positive for depression.  We will rule it out Medical causes and consider starting antidepressant medication.           Relevant Orders    VITAMIN B12    Healthcare maintenance    Relevant Orders    Comp Metabolic Panel    CBC WITH DIFFERENTIAL    Hormone replacement therapy (HRT)     Patient is on hormone replacement therapy with estradiol patch and progesterone, managed by her gynecology.           Weight gain     10 pounds, since 10 months ago.  She reports chronic fatigue.  Will evaluate for thyroid abnormalities, vitamin B12 deficiency, vitamin D deficiency.            Other Visit Diagnoses       Encounter for screening mammogram for breast cancer        Relevant Orders    MA-SCREENING MAMMO BILAT W/TOMOSYNTHESIS W/CAD    Hypothyroidism, unspecified type        Relevant Orders    TSH    FREE THYROXINE            HCM:  as above   Labs per orders  Immunizations per orders  Patient counseled about skin care, diet, supplements, prenatal vitamins, safe sex and exercise.      Follow-up: Return in about 1 week (around 1/26/2023), or if symptoms worsen or fail to improve.

## 2023-01-19 NOTE — LETTER
[facility]  Janessa Polo M.D.  [address]  [place] [address]  Fax: [phone]   Authorization for Release/Disclosure of   Protected Health Information   Name: SYDNEY MORRIS : 1965 SSN: [id number]   Address: [address]  [place] [address] Phone:    [phone] (home)    I authorize the entity listed below to release/disclose the PHI below to:   [facility]/Janessa Polo M.D. and Janessa Polo M.D.   Provider or Entity Name:  [facility]    Address   City, State, [facility]   Phone:      Fax:     Reason for request: continuity of care   Information to be released:    [x ] LAST COLONOSCOPY,  including any PATH REPORT and follow-up  [  ] LAST FIT/COLOGUARD RESULT [  ] LAST DEXA  [  ] LAST MAMMOGRAM  [  ] LAST PAP  [  ] LAST LABS [  ] RETINA EXAM REPORT  [  ] IMMUNIZATION RECORDS  [  ] Release all info      [  ] Check here and initial the line next to each item to release ALL health information INCLUDING  _____ Care and treatment for drug and / or alcohol abuse  _____ HIV testing, infection status, or AIDS  _____ Genetic Testing    DATES OF SERVICE OR TIME PERIOD TO BE DISCLOSED: _____________  I understand and acknowledge that:  * This Authorization may be revoked at any time by you in writing, except if your health information has already been used or disclosed.  * Your health information that will be used or disclosed as a result of you signing this authorization could be re-disclosed by the recipient. If this occurs, your re-disclosed health information may no longer be protected by State or Federal laws.  * You may refuse to sign this Authorization. Your refusal will not affect your ability to obtain treatment.  * This Authorization becomes effective upon signing and will  on (date) __________.      If no date is indicated, this Authorization will  one (1) year from the signature date.    Name: Sydney Morris    Signature:    Date:     1/19/2023       PLEASE FAX REQUESTED RECORDS BACK TO: [phone]

## 2023-01-20 NOTE — ASSESSMENT & PLAN NOTE
10 pounds, since 10 months ago.  She reports chronic fatigue.  Will evaluate for thyroid abnormalities, vitamin B12 deficiency, vitamin D deficiency.

## 2023-01-20 NOTE — ASSESSMENT & PLAN NOTE
Patient is on hormone replacement therapy with estradiol patch and progesterone, managed by her gynecology.

## 2023-01-20 NOTE — ASSESSMENT & PLAN NOTE
This is a chronic problem, onset over 3 years ago.  Patient has had extensive work-up completed by endocrinologist, she was seen integrative medicine physician who was prescribing her [place] Thyroid.  In the last appointment in March I have referred her to sleep medicine specialist.    Differentials include chronic fatigue syndrome, hypothyroidism, dietary deficiencies of vitamin D, vitamin B12.    Obtain levels as above.    PHQ-9 was positive for depression.  We will rule it out Medical causes and consider starting antidepressant medication.

## 2023-01-24 ENCOUNTER — HOSPITAL ENCOUNTER (OUTPATIENT)
Dept: LAB | Facility: MEDICAL CENTER | Age: 58
End: 2023-01-24
Attending: INTERNAL MEDICINE
Payer: COMMERCIAL

## 2023-01-24 DIAGNOSIS — E03.9 HYPOTHYROIDISM, UNSPECIFIED TYPE: ICD-10-CM

## 2023-01-24 DIAGNOSIS — R53.82 CHRONIC FATIGUE: ICD-10-CM

## 2023-01-24 DIAGNOSIS — E55.9 VITAMIN D DEFICIENCY: ICD-10-CM

## 2023-01-24 DIAGNOSIS — Z00.00 HEALTHCARE MAINTENANCE: ICD-10-CM

## 2023-01-24 LAB
25(OH)D3 SERPL-MCNC: 51 NG/ML (ref 30–100)
ALBUMIN SERPL BCP-MCNC: 4.8 G/DL (ref 3.2–4.9)
ALBUMIN/GLOB SERPL: 1.7 G/DL
ALP SERPL-CCNC: 62 U/L (ref 30–99)
ALT SERPL-CCNC: 15 U/L (ref 2–50)
ANION GAP SERPL CALC-SCNC: 9 MMOL/L (ref 7–16)
AST SERPL-CCNC: 21 U/L (ref 12–45)
BASOPHILS # BLD AUTO: 0.8 % (ref 0–1.8)
BASOPHILS # BLD: 0.04 K/UL (ref 0–0.12)
BILIRUB SERPL-MCNC: 1.3 MG/DL (ref 0.1–1.5)
BUN SERPL-MCNC: 12 MG/DL (ref 8–22)
CALCIUM ALBUM COR SERPL-MCNC: 8.9 MG/DL (ref 8.5–10.5)
CALCIUM SERPL-MCNC: 9.5 MG/DL (ref 8.5–10.5)
CHLORIDE SERPL-SCNC: 105 MMOL/L (ref 96–112)
CO2 SERPL-SCNC: 26 MMOL/L (ref 20–33)
CREAT SERPL-MCNC: 0.67 MG/DL (ref 0.5–1.4)
EOSINOPHIL # BLD AUTO: 0.06 K/UL (ref 0–0.51)
EOSINOPHIL NFR BLD: 1.2 % (ref 0–6.9)
ERYTHROCYTE [DISTWIDTH] IN BLOOD BY AUTOMATED COUNT: 44.9 FL (ref 35.9–50)
FASTING STATUS PATIENT QL REPORTED: NORMAL
GFR SERPLBLD CREATININE-BSD FMLA CKD-EPI: 102 ML/MIN/1.73 M 2
GLOBULIN SER CALC-MCNC: 2.8 G/DL (ref 1.9–3.5)
GLUCOSE SERPL-MCNC: 93 MG/DL (ref 65–99)
HCT VFR BLD AUTO: 43.7 % (ref 37–47)
HGB BLD-MCNC: 14.1 G/DL (ref 12–16)
IMM GRANULOCYTES # BLD AUTO: 0.01 K/UL (ref 0–0.11)
IMM GRANULOCYTES NFR BLD AUTO: 0.2 % (ref 0–0.9)
LYMPHOCYTES # BLD AUTO: 1.76 K/UL (ref 1–4.8)
LYMPHOCYTES NFR BLD: 33.8 % (ref 22–41)
MCH RBC QN AUTO: 30.6 PG (ref 27–33)
MCHC RBC AUTO-ENTMCNC: 32.3 G/DL (ref 33.6–35)
MCV RBC AUTO: 94.8 FL (ref 81.4–97.8)
MONOCYTES # BLD AUTO: 0.29 K/UL (ref 0–0.85)
MONOCYTES NFR BLD AUTO: 5.6 % (ref 0–13.4)
NEUTROPHILS # BLD AUTO: 3.04 K/UL (ref 2–7.15)
NEUTROPHILS NFR BLD: 58.4 % (ref 44–72)
NRBC # BLD AUTO: 0 K/UL
NRBC BLD-RTO: 0 /100 WBC
PLATELET # BLD AUTO: 230 K/UL (ref 164–446)
PMV BLD AUTO: 12.4 FL (ref 9–12.9)
POTASSIUM SERPL-SCNC: 4.5 MMOL/L (ref 3.6–5.5)
PROT SERPL-MCNC: 7.6 G/DL (ref 6–8.2)
RBC # BLD AUTO: 4.61 M/UL (ref 4.2–5.4)
SODIUM SERPL-SCNC: 140 MMOL/L (ref 135–145)
T4 FREE SERPL-MCNC: 0.96 NG/DL (ref 0.93–1.7)
TSH SERPL DL<=0.005 MIU/L-ACNC: 2.39 UIU/ML (ref 0.38–5.33)
VIT B12 SERPL-MCNC: 993 PG/ML (ref 211–911)
WBC # BLD AUTO: 5.2 K/UL (ref 4.8–10.8)

## 2023-01-24 PROCEDURE — 84443 ASSAY THYROID STIM HORMONE: CPT

## 2023-01-24 PROCEDURE — 84439 ASSAY OF FREE THYROXINE: CPT

## 2023-01-24 PROCEDURE — 36415 COLL VENOUS BLD VENIPUNCTURE: CPT

## 2023-01-24 PROCEDURE — 80053 COMPREHEN METABOLIC PANEL: CPT

## 2023-01-24 PROCEDURE — 85025 COMPLETE CBC W/AUTO DIFF WBC: CPT

## 2023-01-24 PROCEDURE — 82306 VITAMIN D 25 HYDROXY: CPT

## 2023-01-24 PROCEDURE — 82607 VITAMIN B-12: CPT

## 2023-01-27 ENCOUNTER — TELEPHONE (OUTPATIENT)
Dept: HEALTH INFORMATION MANAGEMENT | Facility: OTHER | Age: 58
End: 2023-01-27
Payer: COMMERCIAL

## 2023-01-27 DIAGNOSIS — E78.5 DYSLIPIDEMIA (HIGH LDL; LOW HDL): ICD-10-CM

## 2023-01-27 NOTE — TELEPHONE ENCOUNTER
1. Caller Name: Sydney Mckeon                        Call Back Number: [phone]      How would the patient prefer to be contacted with a response: [employer] message    Pt sent my chart message requesting-   a lab order for a Lipid Panel to check your cholesterol levels.

## 2023-03-23 ENCOUNTER — OFFICE VISIT (OUTPATIENT)
Dept: MEDICAL GROUP | Facility: MEDICAL CENTER | Age: 58
End: 2023-03-23
Payer: COMMERCIAL

## 2023-03-23 VITALS
OXYGEN SATURATION: 97 % | DIASTOLIC BLOOD PRESSURE: 64 MMHG | SYSTOLIC BLOOD PRESSURE: 116 MMHG | WEIGHT: 126.88 LBS | BODY MASS INDEX: 19.23 KG/M2 | HEIGHT: 68 IN | TEMPERATURE: 97.3 F | HEART RATE: 71 BPM

## 2023-03-23 DIAGNOSIS — E55.9 VITAMIN D DEFICIENCY: Chronic | ICD-10-CM

## 2023-03-23 DIAGNOSIS — F32.A ANXIETY AND DEPRESSION: ICD-10-CM

## 2023-03-23 DIAGNOSIS — F41.9 ANXIETY AND DEPRESSION: ICD-10-CM

## 2023-03-23 DIAGNOSIS — M79.2 NERVE PAIN: ICD-10-CM

## 2023-03-23 DIAGNOSIS — L65.9 HAIR LOSS: ICD-10-CM

## 2023-03-23 DIAGNOSIS — F32.A DEPRESSION, UNSPECIFIED DEPRESSION TYPE: ICD-10-CM

## 2023-03-23 PROCEDURE — 99214 OFFICE O/P EST MOD 30 MIN: CPT | Performed by: INTERNAL MEDICINE

## 2023-03-23 RX ORDER — FLUOXETINE 20 MG/1
TABLET, FILM COATED ORAL
Qty: 90 TABLET | Refills: 1 | Status: SHIPPED | OUTPATIENT
Start: 2023-03-23 | End: 2023-07-07

## 2023-03-23 RX ORDER — GABAPENTIN 100 MG/1
100 CAPSULE ORAL 3 TIMES DAILY
Qty: 90 CAPSULE | Refills: 1 | Status: SHIPPED | OUTPATIENT
Start: 2023-03-23 | End: 2023-03-23

## 2023-03-23 RX ORDER — GABAPENTIN 100 MG/1
100 CAPSULE ORAL 3 TIMES DAILY PRN
Qty: 90 CAPSULE | Refills: 1 | Status: SHIPPED | OUTPATIENT
Start: 2023-03-23 | End: 2023-06-29

## 2023-03-23 ASSESSMENT — ENCOUNTER SYMPTOMS
CHILLS: 0
DEPRESSION: 1
HEARTBURN: 0
SORE THROAT: 0
COUGH: 0
PALPITATIONS: 0
FEVER: 0
VOMITING: 0
WEIGHT LOSS: 0
ABDOMINAL PAIN: 0
SHORTNESS OF BREATH: 0
NAUSEA: 0
MYALGIAS: 0
INSOMNIA: 1
FOCAL WEAKNESS: 0

## 2023-03-23 ASSESSMENT — FIBROSIS 4 INDEX: FIB4 SCORE: 1.32

## 2023-03-23 NOTE — ASSESSMENT & PLAN NOTE
Ongoing problem, currently uncontrolled and untreated.  Patient is interested in therapy.  Discussed options available, patient opted for fluoxetine/Prozac.  Start 10 mg, upgrade to 20 mg as tolerated.

## 2023-03-23 NOTE — PROGRESS NOTES
Subjective:     Chief Complaint   Patient presents with    Hip Pain     Left     Hair Loss     Diagnoses of Nerve pain, Hair loss, Depression, unspecified depression type, Anxiety and depression, and Vitamin D deficiency were pertinent to this visit.    HPI: Sydney is a pleasant 57 y.o. female who presents today to discuss the following:    Problem   Anxiety and Depression    This is an ongoing condition.  Currently untreated.  Prior regimens: was prescribed SSRIs in the past    PHQ-9 11  LATHA: 20   Associated symptoms: Hair loss, fatigue  Suicidal ideation/homicidal ideation: No     Hair Loss    This is a ongoing problem, within the last 3-4 months patient has been experiencing significant diffuse hair thinning.  No patches.  Lab work has been unremarkable.  Vitamin D, vitamin B12, CBC CMP unremarkable.  TSH and free T4 within normal limits.  Patient requests free T3 to be checked.     Vitamin D Deficiency    Vitamin D level within normal limits, continue supplementation.       Current Outpatient Medications Ordered in Epic   Medication Sig Dispense Refill    fluoxetine (PROZAC) 20 MG tablet Take 0.5 Tablets by mouth every day for 14 days, THEN 1 Tablet every day for 76 days. 90 Tablet 1    gabapentin (NEURONTIN) 100 MG Cap Take 1 Capsule by mouth 3 times a day as needed (nerve pain). 90 Capsule 1    estradiol (CLIMARA) 0.0375 MG/24HR patch  (Patient not taking: Reported on 1/19/2023)      estradiol (VIVELLE) 0.0375 MG/24HR patch APPLY 1 PATCH TRANSDERMAL EVERY 3 1/2 DAYS      scopolamine (TRANSDERM-SCOP) 1 mg/72hr PATCH 72 HR scopolamine 1 mg over 3 days transdermal patch   APPLY ONE PATCH BEHIND THE EAR EVERY 72 HOURS      progesterone (PROMETRIUM) 100 MG Cap       MELATONIN PO Take 1 tablet by mouth at bedtime as needed.      Multiple Vitamins-Minerals (MULTIVITAMIN ADULT PO) Take 1 tablet by mouth every day.      vitamin D (CHOLECALCIFEROL) 1000 UNIT Tab Take 1,000 Units by mouth every day.       No current  "[facility]-ordered facility-administered medications on file.     Review of Systems   Constitutional:  Positive for malaise/fatigue. Negative for chills, fever and weight loss.   HENT:  Negative for sore throat.    Respiratory:  Negative for cough and shortness of breath.    Cardiovascular:  Negative for chest pain and palpitations.   Gastrointestinal:  Negative for abdominal pain, heartburn, nausea and vomiting.   Musculoskeletal:  Negative for myalgias.   Neurological:  Negative for focal weakness.   Psychiatric/Behavioral:  Positive for depression. Negative for suicidal ideas. The patient has insomnia.      Objective:     Exam:  /64 (BP Location: Right arm, Patient Position: Sitting, BP Cuff Size: Adult)   Pulse 71   Temp 36.3 °C (97.3 °F) (Temporal)   Ht 1.727 m (5' 8\")   Wt 57.6 kg (126 lb 14 oz)   SpO2 97%   BMI 19.29 kg/m²  Body mass index is 19.29 kg/m².    Physical Exam  Constitutional:       Appearance: Normal appearance.   Musculoskeletal:      Left hip: Normal. No tenderness or bony tenderness.   Neurological:      Mental Status: She is alert.     Labs: Discussed results of TSH, free T4, vitamin D, vitamin B12 CBC and CMP from 1/24/2023    Assessment & Plan:   Sydney  is a pleasant 57 y.o. female with the following -     Problem List Items Addressed This Visit       Hair loss (Chronic)     Ongoing problem, uncertain etiology.  Blood work has been unrevealing.  Patient is on hormone replacement therapy through her GYN with estradiol and progesterone.  Advised to follow-up with them if she is willing her hormone levels to be evaluated.   -Refer to dermatology for further evaluation.         Relevant Orders    Referral to Dermatology    T3 FREE    FERRITIN    IRON/TOTAL IRON BIND    Vitamin D deficiency (Chronic)     Vitamin D level within normal limits, continue vitamin D3 supplementation.           Anxiety and depression     Ongoing problem, currently uncontrolled and untreated.  Patient is " interested in therapy.  Discussed options available, patient opted for fluoxetine/Prozac.  Start 10 mg, upgrade to 20 mg as tolerated.           Relevant Medications    fluoxetine (PROZAC) 20 MG tablet    Nerve pain     Trial of gabapentin.         Relevant Medications    gabapentin (NEURONTIN) 100 MG Cap     Return in 3 months (on 6/23/2023) for depression/anxiety fv.    Please note that this dictation was created using voice recognition software. I have made every reasonable attempt to correct obvious errors, but I expect that there are errors of grammar and possibly content that I did not discover before finalizing the note.

## 2023-04-18 ENCOUNTER — HOSPITAL ENCOUNTER (OUTPATIENT)
Dept: LAB | Facility: MEDICAL CENTER | Age: 58
End: 2023-04-18
Attending: OBSTETRICS & GYNECOLOGY
Payer: COMMERCIAL

## 2023-04-18 LAB — ESTRADIOL SERPL-MCNC: 68.5 PG/ML

## 2023-04-18 PROCEDURE — 84402 ASSAY OF FREE TESTOSTERONE: CPT

## 2023-04-18 PROCEDURE — 84481 FREE ASSAY (FT-3): CPT

## 2023-04-18 PROCEDURE — 84270 ASSAY OF SEX HORMONE GLOBUL: CPT

## 2023-04-18 PROCEDURE — 36415 COLL VENOUS BLD VENIPUNCTURE: CPT

## 2023-04-18 PROCEDURE — 84439 ASSAY OF FREE THYROXINE: CPT

## 2023-04-18 PROCEDURE — 82670 ASSAY OF TOTAL ESTRADIOL: CPT

## 2023-04-18 PROCEDURE — 84443 ASSAY THYROID STIM HORMONE: CPT

## 2023-04-18 PROCEDURE — 84403 ASSAY OF TOTAL TESTOSTERONE: CPT

## 2023-04-19 LAB
T3FREE SERPL-MCNC: 3.39 PG/ML (ref 2–4.4)
T4 FREE SERPL-MCNC: 0.96 NG/DL (ref 0.93–1.7)
TSH SERPL DL<=0.005 MIU/L-ACNC: 0.92 UIU/ML (ref 0.38–5.33)

## 2023-04-24 LAB
SHBG SERPL-SCNC: 104 NMOL/L (ref 17–125)
TESTOST FREE SERPL-MCNC: 3.1 PG/ML (ref 0.6–3.8)
TESTOST SERPL-MCNC: 41 NG/DL (ref 9–55)

## 2023-05-24 ENCOUNTER — OFFICE VISIT (OUTPATIENT)
Dept: DERMATOLOGY | Facility: IMAGING CENTER | Age: 58
End: 2023-05-24
Payer: COMMERCIAL

## 2023-05-24 DIAGNOSIS — L65.9 HAIR LOSS: ICD-10-CM

## 2023-05-24 PROCEDURE — 99213 OFFICE O/P EST LOW 20 MIN: CPT | Performed by: NURSE PRACTITIONER

## 2023-05-24 NOTE — PROGRESS NOTES
"DERMATOLOGY NOTE  NEW VISIT       Chief complaint: Establish Care and Hair Loss     Pt has concern of hair loss on hair line and were she parts her hair. States it started happening after she got covid 4 years ago.       History of skin cancer: No  History of precancers/actinic keratoses: Yes, Details: lt side of face 2022  History of biopsies:No  History of blistering/severe sunburns:Yes, Details: childhood, teenager, adult   Family history of skin cancer:Yes, Details: father, melanoma  Family history of atypical moles:No      Allergies   Allergen Reactions    Sulfa Drugs Rash     Rash and shortness of breath    Vicodin [Hydrocodone-Acetaminophen] Itching     \"bugs crawling on me\"        MEDICATIONS:  Medications relevant to specialty reviewed.     REVIEW OF SYSTEMS:   Positive for none  Negative for none       EXAM:  There were no vitals taken for this visit.  Constitutional: Well-developed, well-nourished, and in no distress.     A focused skin exam was performed including the affected areas of the head (including face). Notable findings on exam today listed below and/or in assessment/plan.     Notable hair thinning through vertex, parietal and crown, no evidence of AA, rash, irritation or lesions    IMPRESSION / PLAN:    1. Hair loss, likely telogen effluvium  Discussed course/nature of disease  Pt had COVID in November 2022, experienced hair loss with a previous COVID infection  Discussed importance of good hair health/maintenance  Advised use of minoxidil for men BID  Follow up for no improvement or worsening of S/Sx's      Patient verbalized understanding and agrees with plan regarding the above          Please note that this dictation was created using voice recognition software. I have made every reasonable attempt to correct obvious errors, but I expect that there are errors of grammar and possibly content that I did not discover before finalizing the note.      Return to clinic in: Return for PRN if no " improvement. and as needed for any new or changing skin lesions.

## 2023-06-17 ENCOUNTER — HOSPITAL ENCOUNTER (OUTPATIENT)
Dept: LAB | Facility: MEDICAL CENTER | Age: 58
End: 2023-06-17
Attending: INTERNAL MEDICINE
Payer: COMMERCIAL

## 2023-06-17 DIAGNOSIS — E78.5 DYSLIPIDEMIA (HIGH LDL; LOW HDL): ICD-10-CM

## 2023-06-17 DIAGNOSIS — L65.9 HAIR LOSS: ICD-10-CM

## 2023-06-17 LAB
CHOLEST SERPL-MCNC: 235 MG/DL (ref 100–199)
FERRITIN SERPL-MCNC: 79 NG/ML (ref 10–291)
HDLC SERPL-MCNC: 87 MG/DL
IRON SATN MFR SERPL: 33 % (ref 15–55)
IRON SERPL-MCNC: 103 UG/DL (ref 40–170)
LDLC SERPL CALC-MCNC: 135 MG/DL
T3FREE SERPL-MCNC: 2.64 PG/ML (ref 2–4.4)
TIBC SERPL-MCNC: 313 UG/DL (ref 250–450)
TRIGL SERPL-MCNC: 66 MG/DL (ref 0–149)
UIBC SERPL-MCNC: 210 UG/DL (ref 110–370)

## 2023-06-17 PROCEDURE — 36415 COLL VENOUS BLD VENIPUNCTURE: CPT

## 2023-06-17 PROCEDURE — 83540 ASSAY OF IRON: CPT

## 2023-06-17 PROCEDURE — 80061 LIPID PANEL: CPT

## 2023-06-17 PROCEDURE — 82728 ASSAY OF FERRITIN: CPT

## 2023-06-17 PROCEDURE — 83550 IRON BINDING TEST: CPT

## 2023-06-17 PROCEDURE — 84481 FREE ASSAY (FT-3): CPT

## 2023-07-25 ENCOUNTER — APPOINTMENT (RX ONLY)
Dept: URBAN - METROPOLITAN AREA CLINIC 35 | Facility: CLINIC | Age: 58
Setting detail: DERMATOLOGY
End: 2023-07-25

## 2023-07-25 DIAGNOSIS — Z71.89 OTHER SPECIFIED COUNSELING: ICD-10-CM

## 2023-07-25 DIAGNOSIS — L81.4 OTHER MELANIN HYPERPIGMENTATION: ICD-10-CM

## 2023-07-25 DIAGNOSIS — L82.1 OTHER SEBORRHEIC KERATOSIS: ICD-10-CM

## 2023-07-25 DIAGNOSIS — D22 MELANOCYTIC NEVI: ICD-10-CM

## 2023-07-25 PROBLEM — D22.4 MELANOCYTIC NEVI OF SCALP AND NECK: Status: ACTIVE | Noted: 2023-07-25

## 2023-07-25 PROCEDURE — 99213 OFFICE O/P EST LOW 20 MIN: CPT

## 2023-07-25 PROCEDURE — ? COUNSELING

## 2023-07-25 ASSESSMENT — LOCATION SIMPLE DESCRIPTION DERM
LOCATION SIMPLE: RIGHT UPPER BACK
LOCATION SIMPLE: SCALP
LOCATION SIMPLE: RIGHT SHOULDER
LOCATION SIMPLE: POSTERIOR NECK

## 2023-07-25 ASSESSMENT — LOCATION ZONE DERM
LOCATION ZONE: NECK
LOCATION ZONE: TRUNK
LOCATION ZONE: SCALP
LOCATION ZONE: ARM

## 2023-07-25 ASSESSMENT — LOCATION DETAILED DESCRIPTION DERM
LOCATION DETAILED: RIGHT POSTERIOR SHOULDER
LOCATION DETAILED: RIGHT SUPERIOR UPPER BACK
LOCATION DETAILED: LEFT SUPERIOR PARIETAL SCALP
LOCATION DETAILED: RIGHT MEDIAL TRAPEZIAL NECK
LOCATION DETAILED: RIGHT INFERIOR POSTERIOR NECK

## 2023-08-16 ENCOUNTER — HOSPITAL ENCOUNTER (OUTPATIENT)
Facility: MEDICAL CENTER | Age: 58
End: 2023-08-16
Attending: INTERNAL MEDICINE
Payer: COMMERCIAL

## 2023-08-16 ENCOUNTER — OFFICE VISIT (OUTPATIENT)
Dept: MEDICAL GROUP | Facility: MEDICAL CENTER | Age: 58
End: 2023-08-16
Payer: COMMERCIAL

## 2023-08-16 VITALS
BODY MASS INDEX: 19.05 KG/M2 | SYSTOLIC BLOOD PRESSURE: 100 MMHG | TEMPERATURE: 97.9 F | RESPIRATION RATE: 16 BRPM | DIASTOLIC BLOOD PRESSURE: 60 MMHG | OXYGEN SATURATION: 100 % | WEIGHT: 125.66 LBS | HEART RATE: 77 BPM | HEIGHT: 68 IN

## 2023-08-16 DIAGNOSIS — R30.0 DYSURIA: ICD-10-CM

## 2023-08-16 DIAGNOSIS — M79.2 NERVE PAIN: ICD-10-CM

## 2023-08-16 DIAGNOSIS — R31.9 URINARY TRACT INFECTION WITH HEMATURIA, SITE UNSPECIFIED: ICD-10-CM

## 2023-08-16 DIAGNOSIS — N39.0 URINARY TRACT INFECTION WITH HEMATURIA, SITE UNSPECIFIED: ICD-10-CM

## 2023-08-16 DIAGNOSIS — R31.9 HEMATURIA, UNSPECIFIED TYPE: ICD-10-CM

## 2023-08-16 LAB
APPEARANCE UR: CLEAR
BILIRUB UR STRIP-MCNC: NORMAL MG/DL
COLOR UR AUTO: NORMAL
GLUCOSE UR STRIP.AUTO-MCNC: NORMAL MG/DL
KETONES UR STRIP.AUTO-MCNC: NORMAL MG/DL
LEUKOCYTE ESTERASE UR QL STRIP.AUTO: NORMAL
NITRITE UR QL STRIP.AUTO: NORMAL
PH UR STRIP.AUTO: 5.5 [PH] (ref 5–8)
PROT UR QL STRIP: NORMAL MG/DL
RBC UR QL AUTO: NORMAL
SP GR UR STRIP.AUTO: <=1.005
UROBILINOGEN UR STRIP-MCNC: 0.2 MG/DL

## 2023-08-16 PROCEDURE — 81002 URINALYSIS NONAUTO W/O SCOPE: CPT | Performed by: INTERNAL MEDICINE

## 2023-08-16 PROCEDURE — 3074F SYST BP LT 130 MM HG: CPT | Performed by: INTERNAL MEDICINE

## 2023-08-16 PROCEDURE — 87086 URINE CULTURE/COLONY COUNT: CPT

## 2023-08-16 PROCEDURE — 99214 OFFICE O/P EST MOD 30 MIN: CPT | Performed by: INTERNAL MEDICINE

## 2023-08-16 PROCEDURE — 3078F DIAST BP <80 MM HG: CPT | Performed by: INTERNAL MEDICINE

## 2023-08-16 RX ORDER — THYROID 60 MG/1
1 TABLET ORAL
COMMUNITY

## 2023-08-16 RX ORDER — GABAPENTIN 100 MG/1
100-300 CAPSULE ORAL 3 TIMES DAILY PRN
Qty: 100 CAPSULE | Refills: 1 | Status: SHIPPED | OUTPATIENT
Start: 2023-08-16 | End: 2023-10-16

## 2023-08-16 RX ORDER — PROGESTERONE 100 MG/1
1 CAPSULE ORAL
COMMUNITY

## 2023-08-16 RX ORDER — ESTRADIOL 0.07 MG/D
PATCH, EXTENDED RELEASE TRANSDERMAL
COMMUNITY
Start: 2023-08-03 | End: 2023-08-28

## 2023-08-16 RX ORDER — NITROFURANTOIN 25; 75 MG/1; MG/1
100 CAPSULE ORAL 2 TIMES DAILY
Qty: 14 CAPSULE | Refills: 0 | Status: SHIPPED | OUTPATIENT
Start: 2023-08-16 | End: 2023-08-23

## 2023-08-16 ASSESSMENT — FIBROSIS 4 INDEX: FIB4 SCORE: 1.34

## 2023-08-16 NOTE — ASSESSMENT & PLAN NOTE
Chronic problem, unstable.  bilateral anterior thigh pain.  Work-up has been unrevealing.  We will do a trial of gabapentin

## 2023-08-16 NOTE — PROGRESS NOTES
Subjective:     CC:   Diagnoses of Dysuria, Nerve pain, Urinary tract infection with hematuria, site unspecified, and Hematuria, unspecified type were pertinent to this visit.    HPI: Sydney is a pleasant 58 y.o. female who presents today evaluation of UTI symptoms     Problem   Urinary Tract Infection With Hematuria    Acute problem, symptoms started few days back with painless hematuria and suprapubic pain and discomfort.  Patient also has been experiencing ongoing bloating.  Today she did not have yajaira hematuria, however has ongoing discomfort in her suprapubic area.  She did have some fever and chills at home.  Her POCT UA positive for leukocyte esterase and blood     Nerve Pain    This is a chronic problem, bilateral anterior thigh burning sensation.  She was evaluated by orthopedic specialist and they do not believe it is secondary to bursitis.  Had 2 sessions of physical therapy, significant change in her symptoms.          Past Medical History:   Diagnosis Date    Anxiety     Arthritis     Depression     Herpes genitalia      Current Outpatient Medications Ordered in Epic   Medication Sig Dispense Refill    gabapentin (NEURONTIN) 100 MG Cap Take 1-3 Capsules by mouth 3 times a day as needed (nerve pain). 100 Capsule 1    nitrofurantoin (MACROBID) 100 MG Cap Take 1 Capsule by mouth 2 times a day for 7 days. 14 Capsule 0    LYLLANA 0.075 MG/24HR PATCH BIWEEKLY APPLY 1 PATCH TRANSDERMAL EVERY 3 1/2 DAYS      progesterone (PROMETRIUM) 100 MG Cap Take 1 Capsule by mouth at bedtime.      thyroid (ARMOUR THYROID) 60 MG Tab Take 1 Tablet by mouth every morning before breakfast.      fluoxetine (PROZAC) 20 MG tablet Take 1 Tablet by mouth every day for 90 days. 90 Tablet 1    estradiol (CLIMARA) 0.0375 MG/24HR patch       estradiol (VIVELLE) 0.0375 MG/24HR patch APPLY 1 PATCH TRANSDERMAL EVERY 3 1/2 DAYS (Patient not taking: Reported on 5/24/2023)      progesterone (PROMETRIUM) 100 MG Cap       MELATONIN PO Take 1  "tablet by mouth at bedtime as needed.      Multiple Vitamins-Minerals (MULTIVITAMIN ADULT PO) Take 1 tablet by mouth every day.      vitamin D (CHOLECALCIFEROL) 1000 UNIT Tab Take 1,000 Units by mouth every day.       No current [facility]-ordered facility-administered medications on file.     ROS  Negative except as above    Objective:     Exam:  /60   Pulse 77   Temp 36.6 °C (97.9 °F)   Resp 16   Ht 1.727 m (5' 8\")   Wt 57 kg (125 lb 10.6 oz)   SpO2 100%   BMI 19.11 kg/m²  Body mass index is 19.11 kg/m².    Physical Exam  Constitutional:       Appearance: Normal appearance.   Abdominal:      Tenderness: There is abdominal tenderness in the suprapubic area.   Neurological:      Mental Status: She is alert.       Labs: POCT UA    Assessment & Plan:   Sydney  is a pleasant 58 y.o. female with the following -     Problem List Items Addressed This Visit       Nerve pain (Chronic)     Chronic problem, unstable.  bilateral anterior thigh pain.  Work-up has been unrevealing.  We will do a trial of gabapentin         Relevant Medications    gabapentin (NEURONTIN) 100 MG Cap    Urinary tract infection with hematuria     Acute problem, symptoms are mild to moderate, associated with hematuria.   -Treat with a course of nitrofurantoin   - Send out urine culture   - Generous hydration, cranberry juice   - Repeat UA in 3 weeks for hematuria follow-up         Relevant Medications    nitrofurantoin (MACROBID) 100 MG Cap    Other Relevant Orders    URINALYSIS,CULTURE IF INDICATED    URINE CULTURE(NEW)     Other Visit Diagnoses       Dysuria        Relevant Orders    POCT Urinalysis (Completed)    Hematuria, unspecified type        Relevant Orders    URINALYSIS,CULTURE IF INDICATED    URINE CULTURE(NEW)          Return if symptoms worsen or fail to improve.    Please note that this dictation was created using voice recognition software. I have made every reasonable attempt to correct obvious errors, but I expect that there " are errors of grammar and possibly content that I did not discover before finalizing the note.

## 2023-08-16 NOTE — ASSESSMENT & PLAN NOTE
Acute problem, symptoms are mild to moderate, associated with hematuria.   -Treat with a course of nitrofurantoin   - Send out urine culture   - Generous hydration, cranberry juice   - Repeat UA in 3 weeks for hematuria follow-up

## 2023-08-17 ENCOUNTER — APPOINTMENT (OUTPATIENT)
Dept: RADIOLOGY | Facility: MEDICAL CENTER | Age: 58
End: 2023-08-17
Attending: INTERNAL MEDICINE
Payer: COMMERCIAL

## 2023-08-17 DIAGNOSIS — Z12.31 ENCOUNTER FOR SCREENING MAMMOGRAM FOR BREAST CANCER: ICD-10-CM

## 2023-08-17 PROCEDURE — 77063 BREAST TOMOSYNTHESIS BI: CPT

## 2023-08-18 LAB
BACTERIA UR CULT: NORMAL
SIGNIFICANT IND 70042: NORMAL
SITE SITE: NORMAL
SOURCE SOURCE: NORMAL

## 2023-08-22 ENCOUNTER — TELEPHONE (OUTPATIENT)
Dept: HEALTH INFORMATION MANAGEMENT | Facility: OTHER | Age: 58
End: 2023-08-22
Payer: COMMERCIAL

## 2023-08-28 ENCOUNTER — HOSPITAL ENCOUNTER (OUTPATIENT)
Facility: MEDICAL CENTER | Age: 58
End: 2023-08-28
Attending: INTERNAL MEDICINE
Payer: COMMERCIAL

## 2023-08-28 ENCOUNTER — OFFICE VISIT (OUTPATIENT)
Dept: MEDICAL GROUP | Facility: MEDICAL CENTER | Age: 58
End: 2023-08-28
Payer: COMMERCIAL

## 2023-08-28 VITALS
HEART RATE: 72 BPM | WEIGHT: 132.8 LBS | HEIGHT: 68 IN | DIASTOLIC BLOOD PRESSURE: 68 MMHG | OXYGEN SATURATION: 96 % | TEMPERATURE: 97.9 F | SYSTOLIC BLOOD PRESSURE: 110 MMHG | BODY MASS INDEX: 20.13 KG/M2

## 2023-08-28 DIAGNOSIS — R53.82 CHRONIC FATIGUE: ICD-10-CM

## 2023-08-28 DIAGNOSIS — E03.9 HYPOTHYROIDISM, UNSPECIFIED TYPE: ICD-10-CM

## 2023-08-28 DIAGNOSIS — R31.9 HEMATURIA, UNSPECIFIED TYPE: ICD-10-CM

## 2023-08-28 LAB
APPEARANCE UR: CLEAR
BACTERIA #/AREA URNS HPF: NEGATIVE /HPF
BILIRUB UR QL STRIP.AUTO: NEGATIVE
COLOR UR: YELLOW
EPI CELLS #/AREA URNS HPF: NEGATIVE /HPF
GLUCOSE UR STRIP.AUTO-MCNC: NEGATIVE MG/DL
HYALINE CASTS #/AREA URNS LPF: NORMAL /LPF
KETONES UR STRIP.AUTO-MCNC: NEGATIVE MG/DL
LEUKOCYTE ESTERASE UR QL STRIP.AUTO: ABNORMAL
MICRO URNS: ABNORMAL
NITRITE UR QL STRIP.AUTO: NEGATIVE
PH UR STRIP.AUTO: 7 [PH] (ref 5–8)
PROT UR QL STRIP: NEGATIVE MG/DL
RBC # URNS HPF: NORMAL /HPF
RBC UR QL AUTO: NEGATIVE
SP GR UR STRIP.AUTO: 1
UROBILINOGEN UR STRIP.AUTO-MCNC: 0.2 MG/DL
WBC #/AREA URNS HPF: NORMAL /HPF

## 2023-08-28 PROCEDURE — 99214 OFFICE O/P EST MOD 30 MIN: CPT | Performed by: INTERNAL MEDICINE

## 2023-08-28 PROCEDURE — 3078F DIAST BP <80 MM HG: CPT | Performed by: INTERNAL MEDICINE

## 2023-08-28 PROCEDURE — 81001 URINALYSIS AUTO W/SCOPE: CPT

## 2023-08-28 PROCEDURE — 3074F SYST BP LT 130 MM HG: CPT | Performed by: INTERNAL MEDICINE

## 2023-08-28 ASSESSMENT — ENCOUNTER SYMPTOMS
CHILLS: 0
FEVER: 0
FLANK PAIN: 0

## 2023-08-28 ASSESSMENT — FIBROSIS 4 INDEX: FIB4 SCORE: 1.34

## 2023-08-29 NOTE — ASSESSMENT & PLAN NOTE
Resolved, repeat urinalysis, refer to urology if diarrhea persists.  Of note: History of bladder cancer in patient's mother

## 2023-08-29 NOTE — PROGRESS NOTES
Subjective:     CC:   Diagnoses of Hematuria, unspecified type, Chronic fatigue, and Hypothyroidism, unspecified type were pertinent to this visit.    HPI: Sydney is a pleasant 58 y.o. female who presents today to discuss the following problems:    Problem   Hematuria    1 episode of discomfort in the suprapubic area with the hematuria 10 days ago.  Hematuria have since resolved, symptoms resolved with a course of antibiotics.  The urine culture grew more than 100 CFU of usual general genital valeria.  Of note: History of bladder cancer in patient's mother     Chronic Fatigue    This is a chronic problem, started approximately 3 years ago.  Please see prior notes for details.  Patient started experiencing fatigue, hair loss that initially was attributed to menopause her gynecologist started hormone replacement therapy with estradiol patch and progesterone oral.  It initially helped with her symptoms, however they became progressively worse.  Patient is also under care of integrative medicine physician, who prescribed her [place] Thyroid, while her hormone levels were within normal limits.  Patient was  is drinking approximately 1 pot of coffee every day to help her stay awake.  Her diet consists of morning oatmeal, almond butter sandwich, smoothie, salmon for dinner.  Patient states that she does not follow a vegetarian diet, but does not eat lots of meat.  Her weight appears to be fluctuating over the years ±5 pounds.  Her last set of labs was essentially unremarkable.             Past Medical History:   Diagnosis Date    Anxiety     Arthritis     Depression     Herpes genitalia      Current Outpatient Medications Ordered in Epic   Medication Sig Dispense Refill    progesterone (PROMETRIUM) 100 MG Cap Take 1 Capsule by mouth at bedtime.      thyroid (ARMOUR THYROID) 60 MG Tab Take 1 Tablet by mouth every morning before breakfast.      estradiol (VIVELLE) 0.0375 MG/24HR patch       MELATONIN PO Take 1 tablet by mouth  "at bedtime as needed.      Multiple Vitamins-Minerals (MULTIVITAMIN ADULT PO) Take 1 tablet by mouth every day.      vitamin D (CHOLECALCIFEROL) 1000 UNIT Tab Take 1,000 Units by mouth every day.      gabapentin (NEURONTIN) 100 MG Cap Take 1-3 Capsules by mouth 3 times a day as needed (nerve pain). (Patient not taking: Reported on 8/28/2023) 100 Capsule 1    fluoxetine (PROZAC) 20 MG tablet Take 1 Tablet by mouth every day for 90 days. 90 Tablet 1    estradiol (CLIMARA) 0.0375 MG/24HR patch  (Patient not taking: Reported on 8/28/2023)       No current [facility]-ordered facility-administered medications on file.     Review of Systems   Constitutional:  Negative for chills and fever.   Genitourinary:  Negative for dysuria, flank pain, frequency, hematuria and urgency.     Objective:     Exam:  /68 (BP Location: Left arm, Patient Position: Sitting, BP Cuff Size: Adult)   Pulse 72   Temp 36.6 °C (97.9 °F) (Temporal)   Ht 1.727 m (5' 8\")   Wt 60.2 kg (132 lb 12.8 oz)   SpO2 96%   BMI 20.19 kg/m²  Body mass index is 20.19 kg/m².    Physical Exam  Constitutional:       Appearance: Normal appearance.   Pulmonary:      Effort: Pulmonary effort is normal.   Neurological:      Mental Status: She is alert and oriented to person, place, and time.   Psychiatric:         Mood and Affect: Mood normal.       Labs: Reviewed POCT UA, urine culture from 8/16/2023.    Assessment & Plan:   Sydney  is a pleasant 58 y.o. female with the following -     Problem List Items Addressed This Visit       Chronic fatigue     Chronic problem, uncertain origin, work-up has been unremarkable.  She is on hormone replacement therapy through her GYN.  Patient will benefit from evaluation by endocrinology, will refer to Dr. Natarajan.            Relevant Orders    Referral to Endocrinology    Hematuria     Resolved, repeat urinalysis, refer to urology if diarrhea persists.  Of note: History of bladder cancer in patient's mother           Relevant " Orders    Comp Metabolic Panel    Referral to Urology    URINALYSIS,CULTURE IF INDICATED     Other Visit Diagnoses       Hypothyroidism, unspecified type        Relevant Orders    Referral to Endocrinology          Return if symptoms worsen or fail to improve.    Please note that this dictation was created using voice recognition software. I have made every reasonable attempt to correct obvious errors, but I expect that there are errors of grammar and possibly content that I did not discover before finalizing the note.

## 2023-08-29 NOTE — ASSESSMENT & PLAN NOTE
Chronic problem, uncertain origin, work-up has been unremarkable.  She is on hormone replacement therapy through her GYN.  Patient will benefit from evaluation by endocrinology, will refer to Dr. Natarajan.

## 2023-08-31 ENCOUNTER — APPOINTMENT (OUTPATIENT)
Dept: MEDICAL GROUP | Facility: MEDICAL CENTER | Age: 58
End: 2023-08-31
Payer: COMMERCIAL

## 2023-10-19 ENCOUNTER — HOSPITAL ENCOUNTER (OUTPATIENT)
Facility: MEDICAL CENTER | Age: 58
End: 2023-10-19
Attending: NURSE PRACTITIONER
Payer: COMMERCIAL

## 2023-10-19 PROCEDURE — 88175 CYTOPATH C/V AUTO FLUID REDO: CPT

## 2023-10-22 LAB
COMMENT NL11729A: NORMAL
CYTOLOGIST CVX/VAG CYTO: NORMAL
CYTOLOGY CVX/VAG DOC CYTO: NORMAL
CYTOLOGY CVX/VAG DOC THIN PREP: NORMAL
NOTE NL11727A: NORMAL
OTHER STN SPEC: NORMAL
STAT OF ADQ CVX/VAG CYTO-IMP: NORMAL

## 2023-11-22 ENCOUNTER — HOSPITAL ENCOUNTER (OUTPATIENT)
Dept: LAB | Facility: MEDICAL CENTER | Age: 58
End: 2023-11-22
Attending: INTERNAL MEDICINE
Payer: COMMERCIAL

## 2023-11-22 DIAGNOSIS — R31.9 URINARY TRACT INFECTION WITH HEMATURIA, SITE UNSPECIFIED: ICD-10-CM

## 2023-11-22 DIAGNOSIS — R31.9 HEMATURIA, UNSPECIFIED TYPE: ICD-10-CM

## 2023-11-22 DIAGNOSIS — N39.0 URINARY TRACT INFECTION WITH HEMATURIA, SITE UNSPECIFIED: ICD-10-CM

## 2023-11-22 LAB
ALBUMIN SERPL BCP-MCNC: 4.6 G/DL (ref 3.2–4.9)
ALBUMIN/GLOB SERPL: 1.5 G/DL
ALP SERPL-CCNC: 47 U/L (ref 30–99)
ALT SERPL-CCNC: 13 U/L (ref 2–50)
ANION GAP SERPL CALC-SCNC: 12 MMOL/L (ref 7–16)
APPEARANCE UR: ABNORMAL
AST SERPL-CCNC: 16 U/L (ref 12–45)
BACTERIA #/AREA URNS HPF: ABNORMAL /HPF
BILIRUB SERPL-MCNC: 1.1 MG/DL (ref 0.1–1.5)
BILIRUB UR QL STRIP.AUTO: NEGATIVE
BUN SERPL-MCNC: 6 MG/DL (ref 8–22)
CALCIUM ALBUM COR SERPL-MCNC: 9.4 MG/DL (ref 8.5–10.5)
CALCIUM SERPL-MCNC: 9.9 MG/DL (ref 8.5–10.5)
CHLORIDE SERPL-SCNC: 103 MMOL/L (ref 96–112)
CO2 SERPL-SCNC: 24 MMOL/L (ref 20–33)
COLOR UR: YELLOW
CREAT SERPL-MCNC: 0.47 MG/DL (ref 0.5–1.4)
EPI CELLS #/AREA URNS HPF: ABNORMAL /HPF
GFR SERPLBLD CREATININE-BSD FMLA CKD-EPI: 111 ML/MIN/1.73 M 2
GLOBULIN SER CALC-MCNC: 3 G/DL (ref 1.9–3.5)
GLUCOSE SERPL-MCNC: 90 MG/DL (ref 65–99)
GLUCOSE UR STRIP.AUTO-MCNC: NEGATIVE MG/DL
HYALINE CASTS #/AREA URNS LPF: ABNORMAL /LPF
KETONES UR STRIP.AUTO-MCNC: NEGATIVE MG/DL
LEUKOCYTE ESTERASE UR QL STRIP.AUTO: ABNORMAL
MICRO URNS: ABNORMAL
NITRITE UR QL STRIP.AUTO: NEGATIVE
PH UR STRIP.AUTO: 7 [PH] (ref 5–8)
POTASSIUM SERPL-SCNC: 4.7 MMOL/L (ref 3.6–5.5)
PROT SERPL-MCNC: 7.6 G/DL (ref 6–8.2)
PROT UR QL STRIP: NEGATIVE MG/DL
RBC # URNS HPF: ABNORMAL /HPF
RBC UR QL AUTO: NEGATIVE
SODIUM SERPL-SCNC: 139 MMOL/L (ref 135–145)
SP GR UR STRIP.AUTO: 1.01
UROBILINOGEN UR STRIP.AUTO-MCNC: 0.2 MG/DL
WBC #/AREA URNS HPF: ABNORMAL /HPF

## 2023-11-22 PROCEDURE — 80053 COMPREHEN METABOLIC PANEL: CPT

## 2023-11-22 PROCEDURE — 81001 URINALYSIS AUTO W/SCOPE: CPT

## 2023-11-22 PROCEDURE — 36415 COLL VENOUS BLD VENIPUNCTURE: CPT

## 2023-11-27 ENCOUNTER — OFFICE VISIT (OUTPATIENT)
Dept: MEDICAL GROUP | Facility: MEDICAL CENTER | Age: 58
End: 2023-11-27
Payer: COMMERCIAL

## 2023-11-27 VITALS
BODY MASS INDEX: 20 KG/M2 | HEIGHT: 68 IN | OXYGEN SATURATION: 99 % | DIASTOLIC BLOOD PRESSURE: 74 MMHG | HEART RATE: 60 BPM | WEIGHT: 132 LBS | TEMPERATURE: 97.8 F | SYSTOLIC BLOOD PRESSURE: 106 MMHG

## 2023-11-27 DIAGNOSIS — R31.9 HEMATURIA, UNSPECIFIED TYPE: ICD-10-CM

## 2023-11-27 DIAGNOSIS — R82.90 ABNORMAL URINE FINDING: ICD-10-CM

## 2023-11-27 DIAGNOSIS — Z79.890 HORMONE REPLACEMENT THERAPY (HRT): ICD-10-CM

## 2023-11-27 LAB
APPEARANCE UR: CLEAR
BILIRUB UR STRIP-MCNC: NEGATIVE MG/DL
COLOR UR AUTO: YELLOW
GLUCOSE UR STRIP.AUTO-MCNC: NEGATIVE MG/DL
KETONES UR STRIP.AUTO-MCNC: NEGATIVE MG/DL
LEUKOCYTE ESTERASE UR QL STRIP.AUTO: NEGATIVE
NITRITE UR QL STRIP.AUTO: NEGATIVE
PH UR STRIP.AUTO: 7 [PH] (ref 5–8)
PROT UR QL STRIP: NEGATIVE MG/DL
RBC UR QL AUTO: NEGATIVE
SP GR UR STRIP.AUTO: 1.01
UROBILINOGEN UR STRIP-MCNC: 0.2 MG/DL

## 2023-11-27 PROCEDURE — 99214 OFFICE O/P EST MOD 30 MIN: CPT | Performed by: INTERNAL MEDICINE

## 2023-11-27 PROCEDURE — 3078F DIAST BP <80 MM HG: CPT | Performed by: INTERNAL MEDICINE

## 2023-11-27 PROCEDURE — 3074F SYST BP LT 130 MM HG: CPT | Performed by: INTERNAL MEDICINE

## 2023-11-27 PROCEDURE — 81002 URINALYSIS NONAUTO W/O SCOPE: CPT | Performed by: INTERNAL MEDICINE

## 2023-11-27 ASSESSMENT — FIBROSIS 4 INDEX: FIB4 SCORE: 1.1

## 2023-11-27 NOTE — PROGRESS NOTES
Subjective:     CC:   Diagnoses of Hematuria, unspecified type, Hormone replacement therapy (HRT), and Abnormal urine finding were pertinent to this visit.    HPI: Sydney is a pleasant 58 y.o. female who presents today to discuss the following problems:  Problem   Abnormal Urine Finding    UA came back positive for bacteria, WBCs and multiple epithelial cells.    Repeat UA in office is negative.  Patient is asymptomatic.     Hematuria    1 episode of discomfort in the suprapubic area with the hematuria 10 days ago.  Hematuria have since resolved, symptoms resolved with a course of antibiotics.  The urine culture grew more than 100 CFU of usual general genital valeria.  Of note: History of bladder cancer in patient's mother.     Update: Following UA is negative for hematuria     Hormone Replacement Therapy (Hrt)    Patient is on estradiol patch and progesterone through gynecology.  She tells me her dose was recently increased.  Her Pap smear was normal in October 2023.        Past Medical History:   Diagnosis Date    Anxiety     Arthritis     Depression     Herpes genitalia      Current Outpatient Medications Ordered in Epic   Medication Sig Dispense Refill    progesterone (PROMETRIUM) 100 MG Cap Take 1 Capsule by mouth at bedtime.      thyroid (ARMOUR THYROID) 60 MG Tab Take 1 Tablet by mouth every morning before breakfast.      estradiol (VIVELLE) 0.0375 MG/24HR patch       MELATONIN PO Take 1 tablet by mouth at bedtime as needed.      Multiple Vitamins-Minerals (MULTIVITAMIN ADULT PO) Take 1 tablet by mouth every day.      vitamin D (CHOLECALCIFEROL) 1000 UNIT Tab Take 1,000 Units by mouth every day.       No current [facility]-ordered facility-administered medications on file.     Review of Systems   Genitourinary:  Negative for dysuria and urgency.     Objective:     Exam:  /74 (BP Location: Left arm, Patient Position: Sitting, BP Cuff Size: Adult)   Pulse 60   Temp 36.6 °C (97.8 °F) (Temporal)   Ht 1.727 m (5'  "8\")   Wt 59.9 kg (132 lb)   SpO2 99%   BMI 20.07 kg/m²  Body mass index is 20.07 kg/m².    Physical Exam  Constitutional:       Appearance: Normal appearance.   Pulmonary:      Effort: Pulmonary effort is normal.   Neurological:      Mental Status: She is alert and oriented to person, place, and time.   Psychiatric:         Mood and Affect: Mood normal.       Labs:  Reviewed and discussed CBC, CMP, UA from 11/22/2023.   Assessment & Plan:   Sydney  is a pleasant 58 y.o. female with the following -     Problem List Items Addressed This Visit       Abnormal urine finding    Hematuria    Relevant Orders    POCT Urinalysis    Hormone replacement therapy (HRT)     Follow-up with GYN          Return if symptoms worsen or fail to improve.    Please note that this dictation was created using voice recognition software. I have made every reasonable attempt to correct obvious errors, but I expect that there are errors of grammar and possibly content that I did not discover before finalizing the note.        "

## 2024-01-11 ENCOUNTER — TELEPHONE (OUTPATIENT)
Dept: MEDICAL GROUP | Facility: MEDICAL CENTER | Age: 59
End: 2024-01-11
Payer: COMMERCIAL

## 2024-01-11 DIAGNOSIS — E03.9 HYPOTHYROIDISM, UNSPECIFIED TYPE: ICD-10-CM

## 2024-01-11 NOTE — TELEPHONE ENCOUNTER
Request for referral for   RICKY PALOMARES  [address] [place] [id number]  Phone: [phone]

## 2024-01-18 ENCOUNTER — HOSPITAL ENCOUNTER (OUTPATIENT)
Dept: LAB | Facility: MEDICAL CENTER | Age: 59
End: 2024-01-18
Attending: INTERNAL MEDICINE
Payer: COMMERCIAL

## 2024-01-18 LAB
25(OH)D3 SERPL-MCNC: 49 NG/ML (ref 30–100)
CORTIS SERPL-MCNC: 11.5 UG/DL (ref 0–23)
DHEA-S SERPL-MCNC: 157 UG/DL (ref 18.9–205)
ESTRADIOL SERPL-MCNC: 65.8 PG/ML
FSH SERPL-ACNC: 45.9 MIU/ML
LH SERPL-ACNC: 33.7 IU/L
T3FREE SERPL-MCNC: 2.68 PG/ML (ref 2–4.4)
T4 FREE SERPL-MCNC: 1.08 NG/DL (ref 0.93–1.7)
TESTOST SERPL-MCNC: 22 NG/DL (ref 9–75)
THYROPEROXIDASE AB SERPL-ACNC: <9 IU/ML (ref 0–9)
TSH SERPL DL<=0.005 MIU/L-ACNC: 1.22 UIU/ML (ref 0.38–5.33)
VIT B12 SERPL-MCNC: >4000 PG/ML (ref 211–911)

## 2024-01-18 PROCEDURE — 82024 ASSAY OF ACTH: CPT

## 2024-01-18 PROCEDURE — 84270 ASSAY OF SEX HORMONE GLOBUL: CPT

## 2024-01-18 PROCEDURE — 84481 FREE ASSAY (FT-3): CPT

## 2024-01-18 PROCEDURE — 82626 DEHYDROEPIANDROSTERONE: CPT

## 2024-01-18 PROCEDURE — 84443 ASSAY THYROID STIM HORMONE: CPT

## 2024-01-18 PROCEDURE — 83002 ASSAY OF GONADOTROPIN (LH): CPT

## 2024-01-18 PROCEDURE — 82670 ASSAY OF TOTAL ESTRADIOL: CPT

## 2024-01-18 PROCEDURE — 82607 VITAMIN B-12: CPT

## 2024-01-18 PROCEDURE — 82306 VITAMIN D 25 HYDROXY: CPT

## 2024-01-18 PROCEDURE — 82627 DEHYDROEPIANDROSTERONE: CPT

## 2024-01-18 PROCEDURE — 86376 MICROSOMAL ANTIBODY EACH: CPT

## 2024-01-18 PROCEDURE — 82157 ASSAY OF ANDROSTENEDIONE: CPT

## 2024-01-18 PROCEDURE — 84403 ASSAY OF TOTAL TESTOSTERONE: CPT

## 2024-01-18 PROCEDURE — 84439 ASSAY OF FREE THYROXINE: CPT

## 2024-01-18 PROCEDURE — 83001 ASSAY OF GONADOTROPIN (FSH): CPT

## 2024-01-18 PROCEDURE — 82533 TOTAL CORTISOL: CPT

## 2024-01-18 PROCEDURE — 36415 COLL VENOUS BLD VENIPUNCTURE: CPT

## 2024-01-19 LAB
ACTH PLAS-MCNC: 5.4 PG/ML (ref 7.2–63.3)
SHBG SERPL-SCNC: 158 NMOL/L (ref 17–125)

## 2024-01-21 LAB
ANDROST SERPL-MCNC: 0.46 NG/ML (ref 0.13–0.82)
DHEA SERPL-MCNC: 2.24 NG/ML (ref 0.63–4.7)

## 2024-01-25 ENCOUNTER — HOSPITAL ENCOUNTER (OUTPATIENT)
Dept: LAB | Facility: MEDICAL CENTER | Age: 59
End: 2024-01-25
Attending: INTERNAL MEDICINE
Payer: COMMERCIAL

## 2024-01-25 LAB
T3FREE SERPL-MCNC: 2.59 PG/ML (ref 2–4.4)
T4 FREE SERPL-MCNC: 1.12 NG/DL (ref 0.93–1.7)
TSH SERPL DL<=0.005 MIU/L-ACNC: 1.34 UIU/ML (ref 0.38–5.33)

## 2024-01-25 PROCEDURE — 36415 COLL VENOUS BLD VENIPUNCTURE: CPT

## 2024-01-25 PROCEDURE — 84439 ASSAY OF FREE THYROXINE: CPT

## 2024-01-25 PROCEDURE — 84481 FREE ASSAY (FT-3): CPT

## 2024-01-25 PROCEDURE — 84443 ASSAY THYROID STIM HORMONE: CPT

## 2024-02-01 ENCOUNTER — HOSPITAL ENCOUNTER (OUTPATIENT)
Dept: LAB | Facility: MEDICAL CENTER | Age: 59
End: 2024-02-01
Attending: INTERNAL MEDICINE
Payer: COMMERCIAL

## 2024-02-01 LAB
T3FREE SERPL-MCNC: 2.5 PG/ML (ref 2–4.4)
T4 FREE SERPL-MCNC: 1.07 NG/DL (ref 0.93–1.7)
TSH SERPL DL<=0.005 MIU/L-ACNC: 1.63 UIU/ML (ref 0.38–5.33)

## 2024-02-01 PROCEDURE — 84481 FREE ASSAY (FT-3): CPT

## 2024-02-01 PROCEDURE — 84439 ASSAY OF FREE THYROXINE: CPT

## 2024-02-01 PROCEDURE — 84443 ASSAY THYROID STIM HORMONE: CPT

## 2024-02-01 PROCEDURE — 36415 COLL VENOUS BLD VENIPUNCTURE: CPT

## 2024-03-14 ENCOUNTER — APPOINTMENT (RX ONLY)
Dept: URBAN - METROPOLITAN AREA CLINIC 35 | Facility: CLINIC | Age: 59
Setting detail: DERMATOLOGY
End: 2024-03-14

## 2024-03-14 DIAGNOSIS — L82.1 OTHER SEBORRHEIC KERATOSIS: ICD-10-CM

## 2024-03-14 PROCEDURE — ? COUNSELING

## 2024-03-14 PROCEDURE — 99212 OFFICE O/P EST SF 10 MIN: CPT

## 2024-03-14 ASSESSMENT — LOCATION SIMPLE DESCRIPTION DERM: LOCATION SIMPLE: POSTERIOR NECK

## 2024-03-14 ASSESSMENT — LOCATION DETAILED DESCRIPTION DERM: LOCATION DETAILED: RIGHT MEDIAL TRAPEZIAL NECK

## 2024-03-14 ASSESSMENT — LOCATION ZONE DERM: LOCATION ZONE: NECK

## 2024-04-11 ENCOUNTER — HOSPITAL ENCOUNTER (OUTPATIENT)
Dept: LAB | Facility: MEDICAL CENTER | Age: 59
End: 2024-04-11
Attending: PHYSICIAN ASSISTANT
Payer: COMMERCIAL

## 2024-04-11 LAB
BASOPHILS # BLD AUTO: 0.8 % (ref 0–1.8)
BASOPHILS # BLD: 0.04 K/UL (ref 0–0.12)
EOSINOPHIL # BLD AUTO: 0.05 K/UL (ref 0–0.51)
EOSINOPHIL NFR BLD: 1 % (ref 0–6.9)
ERYTHROCYTE [DISTWIDTH] IN BLOOD BY AUTOMATED COUNT: 44.2 FL (ref 35.9–50)
ESTRADIOL SERPL-MCNC: 80 PG/ML
FSH SERPL-ACNC: 31 MIU/ML
HCT VFR BLD AUTO: 42.8 % (ref 37–47)
HGB BLD-MCNC: 13.7 G/DL (ref 12–16)
IMM GRANULOCYTES # BLD AUTO: 0.01 K/UL (ref 0–0.11)
IMM GRANULOCYTES NFR BLD AUTO: 0.2 % (ref 0–0.9)
LH SERPL-ACNC: 20.4 IU/L
LYMPHOCYTES # BLD AUTO: 1.86 K/UL (ref 1–4.8)
LYMPHOCYTES NFR BLD: 36.2 % (ref 22–41)
MCH RBC QN AUTO: 30.9 PG (ref 27–33)
MCHC RBC AUTO-ENTMCNC: 32 G/DL (ref 32.2–35.5)
MCV RBC AUTO: 96.4 FL (ref 81.4–97.8)
MONOCYTES # BLD AUTO: 0.25 K/UL (ref 0–0.85)
MONOCYTES NFR BLD AUTO: 4.9 % (ref 0–13.4)
NEUTROPHILS # BLD AUTO: 2.93 K/UL (ref 1.82–7.42)
NEUTROPHILS NFR BLD: 56.9 % (ref 44–72)
NRBC # BLD AUTO: 0 K/UL
NRBC BLD-RTO: 0 /100 WBC (ref 0–0.2)
PLATELET # BLD AUTO: 232 K/UL (ref 164–446)
PMV BLD AUTO: 11.3 FL (ref 9–12.9)
RBC # BLD AUTO: 4.44 M/UL (ref 4.2–5.4)
T3FREE SERPL-MCNC: 2.15 PG/ML (ref 2–4.4)
T4 FREE SERPL-MCNC: 0.96 NG/DL (ref 0.93–1.7)
TESTOST SERPL-MCNC: 310 NG/DL (ref 9–75)
THYROPEROXIDASE AB SERPL-ACNC: <9 IU/ML (ref 0–9)
TSH SERPL DL<=0.005 MIU/L-ACNC: 1.97 UIU/ML (ref 0.38–5.33)
WBC # BLD AUTO: 5.1 K/UL (ref 4.8–10.8)

## 2024-04-11 PROCEDURE — 84439 ASSAY OF FREE THYROXINE: CPT

## 2024-04-11 PROCEDURE — 83001 ASSAY OF GONADOTROPIN (FSH): CPT

## 2024-04-11 PROCEDURE — 86376 MICROSOMAL ANTIBODY EACH: CPT

## 2024-04-11 PROCEDURE — 84443 ASSAY THYROID STIM HORMONE: CPT

## 2024-04-11 PROCEDURE — 84481 FREE ASSAY (FT-3): CPT

## 2024-04-11 PROCEDURE — 85025 COMPLETE CBC W/AUTO DIFF WBC: CPT

## 2024-04-11 PROCEDURE — 82670 ASSAY OF TOTAL ESTRADIOL: CPT

## 2024-04-11 PROCEDURE — 36415 COLL VENOUS BLD VENIPUNCTURE: CPT

## 2024-04-11 PROCEDURE — 84270 ASSAY OF SEX HORMONE GLOBUL: CPT

## 2024-04-11 PROCEDURE — 84403 ASSAY OF TOTAL TESTOSTERONE: CPT

## 2024-04-11 PROCEDURE — 83002 ASSAY OF GONADOTROPIN (LH): CPT

## 2024-04-13 LAB — SHBG SERPL-SCNC: 113 NMOL/L (ref 17–125)

## 2024-07-24 ENCOUNTER — APPOINTMENT (RX ONLY)
Dept: URBAN - METROPOLITAN AREA CLINIC 35 | Facility: CLINIC | Age: 59
Setting detail: DERMATOLOGY
End: 2024-07-24

## 2024-07-24 DIAGNOSIS — L81.4 OTHER MELANIN HYPERPIGMENTATION: ICD-10-CM

## 2024-07-24 DIAGNOSIS — L82.1 OTHER SEBORRHEIC KERATOSIS: ICD-10-CM

## 2024-07-24 DIAGNOSIS — D485 NEOPLASM OF UNCERTAIN BEHAVIOR OF SKIN: ICD-10-CM

## 2024-07-24 DIAGNOSIS — Z71.89 OTHER SPECIFIED COUNSELING: ICD-10-CM

## 2024-07-24 DIAGNOSIS — D22 MELANOCYTIC NEVI: ICD-10-CM

## 2024-07-24 PROBLEM — D48.5 NEOPLASM OF UNCERTAIN BEHAVIOR OF SKIN: Status: ACTIVE | Noted: 2024-07-24

## 2024-07-24 PROBLEM — D22.4 MELANOCYTIC NEVI OF SCALP AND NECK: Status: ACTIVE | Noted: 2024-07-24

## 2024-07-24 PROCEDURE — ? BIOPSY BY SHAVE METHOD

## 2024-07-24 PROCEDURE — ? COUNSELING

## 2024-07-24 PROCEDURE — 99213 OFFICE O/P EST LOW 20 MIN: CPT | Mod: 25

## 2024-07-24 PROCEDURE — ? PHOTO-DOCUMENTATION

## 2024-07-24 PROCEDURE — 11102 TANGNTL BX SKIN SINGLE LES: CPT

## 2024-07-24 ASSESSMENT — LOCATION DETAILED DESCRIPTION DERM
LOCATION DETAILED: LEFT SUPERIOR PARIETAL SCALP
LOCATION DETAILED: RIGHT POSTERIOR SHOULDER
LOCATION DETAILED: LEFT LATERAL SUPERIOR CHEST
LOCATION DETAILED: RIGHT INFERIOR POSTERIOR NECK
LOCATION DETAILED: RIGHT MEDIAL TRAPEZIAL NECK
LOCATION DETAILED: RIGHT SUPERIOR UPPER BACK

## 2024-07-24 ASSESSMENT — LOCATION SIMPLE DESCRIPTION DERM
LOCATION SIMPLE: POSTERIOR NECK
LOCATION SIMPLE: RIGHT SHOULDER
LOCATION SIMPLE: CHEST
LOCATION SIMPLE: RIGHT UPPER BACK
LOCATION SIMPLE: SCALP

## 2024-07-24 ASSESSMENT — LOCATION ZONE DERM
LOCATION ZONE: ARM
LOCATION ZONE: NECK
LOCATION ZONE: TRUNK
LOCATION ZONE: SCALP

## 2024-08-05 ENCOUNTER — APPOINTMENT (RX ONLY)
Dept: URBAN - METROPOLITAN AREA CLINIC 35 | Facility: CLINIC | Age: 59
Setting detail: DERMATOLOGY
End: 2024-08-05

## 2024-08-05 DIAGNOSIS — D22 MELANOCYTIC NEVI: ICD-10-CM

## 2024-08-05 DIAGNOSIS — L82.1 OTHER SEBORRHEIC KERATOSIS: ICD-10-CM

## 2024-08-05 PROBLEM — D22.5 MELANOCYTIC NEVI OF TRUNK: Status: ACTIVE | Noted: 2024-08-05

## 2024-08-05 PROCEDURE — 99213 OFFICE O/P EST LOW 20 MIN: CPT

## 2024-08-05 PROCEDURE — ? COUNSELING

## 2024-08-05 ASSESSMENT — LOCATION DETAILED DESCRIPTION DERM
LOCATION DETAILED: LEFT LATERAL SUPERIOR CHEST
LOCATION DETAILED: LEFT MEDIAL BREAST 7-8:00 REGION

## 2024-08-05 ASSESSMENT — LOCATION SIMPLE DESCRIPTION DERM
LOCATION SIMPLE: CHEST
LOCATION SIMPLE: LEFT BREAST

## 2024-08-05 ASSESSMENT — LOCATION ZONE DERM: LOCATION ZONE: TRUNK

## 2024-08-16 ENCOUNTER — HOSPITAL ENCOUNTER (OUTPATIENT)
Dept: LAB | Facility: MEDICAL CENTER | Age: 59
End: 2024-08-16
Attending: PHYSICIAN ASSISTANT
Payer: COMMERCIAL

## 2024-08-16 LAB
25(OH)D3 SERPL-MCNC: 42 NG/ML (ref 30–100)
BASOPHILS # BLD AUTO: 0.8 % (ref 0–1.8)
BASOPHILS # BLD: 0.04 K/UL (ref 0–0.12)
EOSINOPHIL # BLD AUTO: 0.06 K/UL (ref 0–0.51)
EOSINOPHIL NFR BLD: 1.1 % (ref 0–6.9)
ERYTHROCYTE [DISTWIDTH] IN BLOOD BY AUTOMATED COUNT: 44.8 FL (ref 35.9–50)
ESTRADIOL SERPL-MCNC: 76.2 PG/ML
FSH SERPL-ACNC: 23.7 MIU/ML
HCT VFR BLD AUTO: 42.8 % (ref 37–47)
HGB BLD-MCNC: 13.8 G/DL (ref 12–16)
IMM GRANULOCYTES # BLD AUTO: 0.01 K/UL (ref 0–0.11)
IMM GRANULOCYTES NFR BLD AUTO: 0.2 % (ref 0–0.9)
LH SERPL-ACNC: 22.3 IU/L
LYMPHOCYTES # BLD AUTO: 1.66 K/UL (ref 1–4.8)
LYMPHOCYTES NFR BLD: 31.6 % (ref 22–41)
MCH RBC QN AUTO: 30.7 PG (ref 27–33)
MCHC RBC AUTO-ENTMCNC: 32.2 G/DL (ref 32.2–35.5)
MCV RBC AUTO: 95.1 FL (ref 81.4–97.8)
MONOCYTES # BLD AUTO: 0.29 K/UL (ref 0–0.85)
MONOCYTES NFR BLD AUTO: 5.5 % (ref 0–13.4)
NEUTROPHILS # BLD AUTO: 3.19 K/UL (ref 1.82–7.42)
NEUTROPHILS NFR BLD: 60.8 % (ref 44–72)
NRBC # BLD AUTO: 0 K/UL
NRBC BLD-RTO: 0 /100 WBC (ref 0–0.2)
PLATELET # BLD AUTO: 214 K/UL (ref 164–446)
PMV BLD AUTO: 11.7 FL (ref 9–12.9)
RBC # BLD AUTO: 4.5 M/UL (ref 4.2–5.4)
T3FREE SERPL-MCNC: 2.47 PG/ML (ref 2–4.4)
T4 FREE SERPL-MCNC: 1.16 NG/DL (ref 0.93–1.7)
TSH SERPL DL<=0.005 MIU/L-ACNC: 1.51 UIU/ML (ref 0.38–5.33)
VIT B12 SERPL-MCNC: 1056 PG/ML (ref 211–911)
WBC # BLD AUTO: 5.3 K/UL (ref 4.8–10.8)

## 2024-08-16 PROCEDURE — 82306 VITAMIN D 25 HYDROXY: CPT

## 2024-08-16 PROCEDURE — 84481 FREE ASSAY (FT-3): CPT

## 2024-08-16 PROCEDURE — 85025 COMPLETE CBC W/AUTO DIFF WBC: CPT

## 2024-08-16 PROCEDURE — 84270 ASSAY OF SEX HORMONE GLOBUL: CPT

## 2024-08-16 PROCEDURE — 83002 ASSAY OF GONADOTROPIN (LH): CPT

## 2024-08-16 PROCEDURE — 82607 VITAMIN B-12: CPT

## 2024-08-16 PROCEDURE — 84403 ASSAY OF TOTAL TESTOSTERONE: CPT

## 2024-08-16 PROCEDURE — 84439 ASSAY OF FREE THYROXINE: CPT

## 2024-08-16 PROCEDURE — 82670 ASSAY OF TOTAL ESTRADIOL: CPT

## 2024-08-16 PROCEDURE — 83001 ASSAY OF GONADOTROPIN (FSH): CPT

## 2024-08-16 PROCEDURE — 84443 ASSAY THYROID STIM HORMONE: CPT

## 2024-08-16 PROCEDURE — 36415 COLL VENOUS BLD VENIPUNCTURE: CPT

## 2024-08-19 LAB — SHBG SERPL-SCNC: 78 NMOL/L (ref 17–125)

## 2024-08-22 LAB — TESTOST SERPL-MCNC: 238 NG/DL (ref 9–55)

## 2024-09-09 ENCOUNTER — APPOINTMENT (RX ONLY)
Dept: URBAN - METROPOLITAN AREA CLINIC 35 | Facility: CLINIC | Age: 59
Setting detail: DERMATOLOGY
End: 2024-09-09

## 2024-09-09 DIAGNOSIS — D485 NEOPLASM OF UNCERTAIN BEHAVIOR OF SKIN: ICD-10-CM

## 2024-09-09 DIAGNOSIS — L82.1 OTHER SEBORRHEIC KERATOSIS: ICD-10-CM

## 2024-09-09 DIAGNOSIS — L81.4 OTHER MELANIN HYPERPIGMENTATION: ICD-10-CM

## 2024-09-09 DIAGNOSIS — Z71.89 OTHER SPECIFIED COUNSELING: ICD-10-CM

## 2024-09-09 DIAGNOSIS — D22 MELANOCYTIC NEVI: ICD-10-CM

## 2024-09-09 PROBLEM — D22.4 MELANOCYTIC NEVI OF SCALP AND NECK: Status: ACTIVE | Noted: 2024-09-09

## 2024-09-09 PROBLEM — D48.5 NEOPLASM OF UNCERTAIN BEHAVIOR OF SKIN: Status: ACTIVE | Noted: 2024-09-09

## 2024-09-09 PROCEDURE — ? BIOPSY BY SHAVE METHOD

## 2024-09-09 PROCEDURE — 11102 TANGNTL BX SKIN SINGLE LES: CPT

## 2024-09-09 PROCEDURE — ? COUNSELING

## 2024-09-09 PROCEDURE — 99213 OFFICE O/P EST LOW 20 MIN: CPT | Mod: 25

## 2024-09-09 ASSESSMENT — LOCATION ZONE DERM
LOCATION ZONE: NECK
LOCATION ZONE: TRUNK
LOCATION ZONE: ARM
LOCATION ZONE: SCALP

## 2024-09-09 ASSESSMENT — LOCATION SIMPLE DESCRIPTION DERM
LOCATION SIMPLE: RIGHT SHOULDER
LOCATION SIMPLE: SCALP
LOCATION SIMPLE: CHEST
LOCATION SIMPLE: RIGHT UPPER BACK
LOCATION SIMPLE: POSTERIOR NECK

## 2024-09-09 ASSESSMENT — LOCATION DETAILED DESCRIPTION DERM
LOCATION DETAILED: RIGHT INFERIOR POSTERIOR NECK
LOCATION DETAILED: LEFT SUPERIOR PARIETAL SCALP
LOCATION DETAILED: LEFT LATERAL SUPERIOR CHEST
LOCATION DETAILED: RIGHT POSTERIOR SHOULDER
LOCATION DETAILED: RIGHT MEDIAL TRAPEZIAL NECK
LOCATION DETAILED: RIGHT SUPERIOR UPPER BACK

## 2024-09-09 NOTE — PROCEDURE: BIOPSY BY SHAVE METHOD

## 2024-09-27 ENCOUNTER — APPOINTMENT (RX ONLY)
Dept: URBAN - METROPOLITAN AREA CLINIC 20 | Facility: CLINIC | Age: 59
Setting detail: DERMATOLOGY
End: 2024-09-27

## 2024-09-27 DIAGNOSIS — L73.8 OTHER SPECIFIED FOLLICULAR DISORDERS: ICD-10-CM

## 2024-09-27 DIAGNOSIS — L82.1 OTHER SEBORRHEIC KERATOSIS: ICD-10-CM

## 2024-09-27 PROCEDURE — ? LIQUID NITROGEN (COSMETIC)

## 2024-09-27 PROCEDURE — ? ELECTRODESICCATION (COSMETIC)

## 2024-09-27 ASSESSMENT — LOCATION DETAILED DESCRIPTION DERM
LOCATION DETAILED: RIGHT ANTERIOR SHOULDER
LOCATION DETAILED: RIGHT POSTERIOR SHOULDER
LOCATION DETAILED: RIGHT SUPERIOR TEMPLE
LOCATION DETAILED: RIGHT CENTRAL MALAR CHEEK
LOCATION DETAILED: GLABELLA
LOCATION DETAILED: LEFT INFERIOR MEDIAL UPPER BACK
LOCATION DETAILED: RIGHT LATERAL MALAR CHEEK
LOCATION DETAILED: LEFT INFERIOR UPPER BACK
LOCATION DETAILED: LEFT FOREHEAD
LOCATION DETAILED: LEFT SUPERIOR TEMPLE
LOCATION DETAILED: LEFT SUPERIOR UPPER BACK
LOCATION DETAILED: LEFT INFERIOR LATERAL NECK
LOCATION DETAILED: LEFT LATERAL SUPERIOR CHEST
LOCATION DETAILED: RIGHT SUPERIOR MEDIAL BUCCAL CHEEK
LOCATION DETAILED: NASAL DORSUM
LOCATION DETAILED: LEFT INFERIOR LATERAL MIDBACK
LOCATION DETAILED: LEFT LATERAL UPPER BACK
LOCATION DETAILED: RIGHT SUPERIOR LATERAL MIDBACK
LOCATION DETAILED: LEFT RIB CAGE
LOCATION DETAILED: RIGHT INFERIOR UPPER BACK
LOCATION DETAILED: RIGHT SUPERIOR MEDIAL MIDBACK
LOCATION DETAILED: RIGHT MID-UPPER BACK
LOCATION DETAILED: LEFT MID TEMPLE
LOCATION DETAILED: RIGHT LATERAL FOREHEAD
LOCATION DETAILED: LEFT INFERIOR CENTRAL MALAR CHEEK
LOCATION DETAILED: LEFT MEDIAL FOREHEAD
LOCATION DETAILED: SUBXIPHOID
LOCATION DETAILED: RIGHT LATERAL SUPERIOR CHEST
LOCATION DETAILED: LEFT POSTERIOR SHOULDER
LOCATION DETAILED: RIGHT MEDIAL UPPER BACK
LOCATION DETAILED: LEFT INFRAMAMMARY CREASE (INNER QUADRANT)
LOCATION DETAILED: RIGHT NASAL SIDEWALL
LOCATION DETAILED: RIGHT FOREHEAD
LOCATION DETAILED: RIGHT INFERIOR MEDIAL MIDBACK
LOCATION DETAILED: RIGHT SUPERIOR LATERAL LOWER BACK
LOCATION DETAILED: RIGHT SUPERIOR MEDIAL FOREHEAD
LOCATION DETAILED: SUPERIOR THORACIC SPINE
LOCATION DETAILED: LEFT MEDIAL TRAPEZIAL NECK
LOCATION DETAILED: RIGHT SUPERIOR UPPER BACK
LOCATION DETAILED: RIGHT RIB CAGE

## 2024-09-27 ASSESSMENT — LOCATION SIMPLE DESCRIPTION DERM
LOCATION SIMPLE: RIGHT CHEEK
LOCATION SIMPLE: RIGHT FOREHEAD
LOCATION SIMPLE: RIGHT UPPER BACK
LOCATION SIMPLE: RIGHT SHOULDER
LOCATION SIMPLE: RIGHT TEMPLE
LOCATION SIMPLE: CHEST
LOCATION SIMPLE: UPPER BACK
LOCATION SIMPLE: LEFT UPPER BACK
LOCATION SIMPLE: LEFT SHOULDER
LOCATION SIMPLE: LEFT BREAST
LOCATION SIMPLE: POSTERIOR NECK
LOCATION SIMPLE: LEFT LOWER BACK
LOCATION SIMPLE: RIGHT LOWER BACK
LOCATION SIMPLE: LEFT CHEEK
LOCATION SIMPLE: RIGHT NOSE
LOCATION SIMPLE: LEFT FOREHEAD
LOCATION SIMPLE: LEFT ANTERIOR NECK
LOCATION SIMPLE: LEFT TEMPLE
LOCATION SIMPLE: NOSE
LOCATION SIMPLE: ABDOMEN
LOCATION SIMPLE: GLABELLA

## 2024-09-27 ASSESSMENT — LOCATION ZONE DERM
LOCATION ZONE: ARM
LOCATION ZONE: NECK
LOCATION ZONE: NOSE
LOCATION ZONE: FACE
LOCATION ZONE: TRUNK

## 2024-09-27 NOTE — PROCEDURE: LIQUID NITROGEN (COSMETIC)
Post-Care Instructions: I reviewed with the patient in detail post-care instructions. Patient is to wear sunprotection, and avoid picking at any of the treated lesions. Pt may apply Vaseline to crusted or scabbing areas.
Price (Use Numbers Only, No Special Characters Or $): 240
Spray Paint Text: The liquid nitrogen was applied to the skin utilizing a spray paint frosting technique.
Detail Level: Simple
Show Spray Paint Technique Variable?: Yes
Spray Paint Technique: No
Consent: The patient's consent was obtained including but not limited to risks of crusting, scabbing, blistering, scarring, darker or lighter pigmentary change, recurrence, incomplete removal and infection. The patient understands that the procedure is cosmetic in nature and is not covered by insurance.
Billing Information: Bill by Static Price

## 2024-09-27 NOTE — PROCEDURE: ELECTRODESICCATION (COSMETIC)
Price (Use Numbers Only, No Special Characters Or $): 0
Detail Level: Zone
Post-Care Instructions: I reviewed with the patient in detail post-care instructions. Patient is to wear sunprotection, and avoid picking at any of the treated lesions. Pt may apply Vaseline to crusted or scabbing areas
Consent: The patient's consent was obtained including but not limited to risks of crusting, scabbing, blistering, scarring, darker or lighter pigmentary change, recurrence, incomplete removal and infection.
Sanchez: 0.6

## 2024-10-17 ENCOUNTER — HOSPITAL ENCOUNTER (OUTPATIENT)
Dept: LAB | Facility: MEDICAL CENTER | Age: 59
End: 2024-10-17
Attending: INTERNAL MEDICINE
Payer: COMMERCIAL

## 2024-10-17 LAB
BASOPHILS # BLD AUTO: 0.7 % (ref 0–1.8)
BASOPHILS # BLD: 0.04 K/UL (ref 0–0.12)
EOSINOPHIL # BLD AUTO: 0.04 K/UL (ref 0–0.51)
EOSINOPHIL NFR BLD: 0.7 % (ref 0–6.9)
ERYTHROCYTE [DISTWIDTH] IN BLOOD BY AUTOMATED COUNT: 43.5 FL (ref 35.9–50)
HCT VFR BLD AUTO: 44.2 % (ref 37–47)
HGB BLD-MCNC: 14.2 G/DL (ref 12–16)
IMM GRANULOCYTES # BLD AUTO: 0.02 K/UL (ref 0–0.11)
IMM GRANULOCYTES NFR BLD AUTO: 0.3 % (ref 0–0.9)
LYMPHOCYTES # BLD AUTO: 1.85 K/UL (ref 1–4.8)
LYMPHOCYTES NFR BLD: 30.9 % (ref 22–41)
MCH RBC QN AUTO: 31 PG (ref 27–33)
MCHC RBC AUTO-ENTMCNC: 32.1 G/DL (ref 32.2–35.5)
MCV RBC AUTO: 96.5 FL (ref 81.4–97.8)
MONOCYTES # BLD AUTO: 0.34 K/UL (ref 0–0.85)
MONOCYTES NFR BLD AUTO: 5.7 % (ref 0–13.4)
NEUTROPHILS # BLD AUTO: 3.7 K/UL (ref 1.82–7.42)
NEUTROPHILS NFR BLD: 61.7 % (ref 44–72)
NRBC # BLD AUTO: 0 K/UL
NRBC BLD-RTO: 0 /100 WBC (ref 0–0.2)
PLATELET # BLD AUTO: 239 K/UL (ref 164–446)
PMV BLD AUTO: 11.6 FL (ref 9–12.9)
RBC # BLD AUTO: 4.58 M/UL (ref 4.2–5.4)
T4 FREE SERPL-MCNC: 1.3 NG/DL (ref 0.93–1.7)
TSH SERPL DL<=0.005 MIU/L-ACNC: 1.15 UIU/ML (ref 0.38–5.33)
WBC # BLD AUTO: 6 K/UL (ref 4.8–10.8)

## 2024-10-17 PROCEDURE — 85025 COMPLETE CBC W/AUTO DIFF WBC: CPT

## 2024-10-17 PROCEDURE — 84439 ASSAY OF FREE THYROXINE: CPT

## 2024-10-17 PROCEDURE — 83002 ASSAY OF GONADOTROPIN (LH): CPT

## 2024-10-17 PROCEDURE — 36415 COLL VENOUS BLD VENIPUNCTURE: CPT

## 2024-10-17 PROCEDURE — 84403 ASSAY OF TOTAL TESTOSTERONE: CPT

## 2024-10-17 PROCEDURE — 84443 ASSAY THYROID STIM HORMONE: CPT

## 2024-10-17 PROCEDURE — 84270 ASSAY OF SEX HORMONE GLOBUL: CPT

## 2024-10-17 PROCEDURE — 83001 ASSAY OF GONADOTROPIN (FSH): CPT

## 2024-10-17 PROCEDURE — 84481 FREE ASSAY (FT-3): CPT

## 2024-10-17 PROCEDURE — 82670 ASSAY OF TOTAL ESTRADIOL: CPT

## 2024-10-18 LAB
ESTRADIOL SERPL-MCNC: 64.4 PG/ML
FSH SERPL-ACNC: 34.1 MIU/ML
LH SERPL-ACNC: 26.5 IU/L
SHBG SERPL-SCNC: 91 NMOL/L (ref 17–125)
T3FREE SERPL-MCNC: 2.57 PG/ML (ref 2–4.4)

## 2024-10-22 LAB — TESTOST SERPL-MCNC: 146 NG/DL (ref 9–55)

## 2025-01-07 ENCOUNTER — HOSPITAL ENCOUNTER (OUTPATIENT)
Dept: LAB | Facility: MEDICAL CENTER | Age: 60
End: 2025-01-07
Attending: HEALTH EDUCATOR
Payer: COMMERCIAL

## 2025-01-07 LAB
ESTRADIOL SERPL-MCNC: 143 PG/ML
FSH SERPL-ACNC: 18.6 MIU/ML
LH SERPL-ACNC: 17.4 IU/L
T3FREE SERPL-MCNC: 2.65 PG/ML (ref 2–4.4)
T4 FREE SERPL-MCNC: 1.14 NG/DL (ref 0.93–1.7)
TSH SERPL DL<=0.005 MIU/L-ACNC: 1.2 UIU/ML (ref 0.38–5.33)

## 2025-01-07 PROCEDURE — 83002 ASSAY OF GONADOTROPIN (LH): CPT

## 2025-01-07 PROCEDURE — 84403 ASSAY OF TOTAL TESTOSTERONE: CPT

## 2025-01-07 PROCEDURE — 84270 ASSAY OF SEX HORMONE GLOBUL: CPT

## 2025-01-07 PROCEDURE — 36415 COLL VENOUS BLD VENIPUNCTURE: CPT

## 2025-01-07 PROCEDURE — 84443 ASSAY THYROID STIM HORMONE: CPT

## 2025-01-07 PROCEDURE — 82670 ASSAY OF TOTAL ESTRADIOL: CPT

## 2025-01-07 PROCEDURE — 83001 ASSAY OF GONADOTROPIN (FSH): CPT

## 2025-01-07 PROCEDURE — 84439 ASSAY OF FREE THYROXINE: CPT

## 2025-01-07 PROCEDURE — 84481 FREE ASSAY (FT-3): CPT

## 2025-01-09 LAB — SHBG SERPL-SCNC: 88 NMOL/L (ref 17–125)

## 2025-01-11 LAB — TESTOST SERPL-MCNC: 308 NG/DL (ref 9–55)

## 2025-02-07 ENCOUNTER — OFFICE VISIT (OUTPATIENT)
Dept: URGENT CARE | Facility: CLINIC | Age: 60
End: 2025-02-07
Payer: COMMERCIAL

## 2025-02-07 VITALS
RESPIRATION RATE: 16 BRPM | HEIGHT: 68 IN | OXYGEN SATURATION: 100 % | SYSTOLIC BLOOD PRESSURE: 108 MMHG | BODY MASS INDEX: 18.04 KG/M2 | WEIGHT: 119 LBS | HEART RATE: 74 BPM | TEMPERATURE: 97.9 F | DIASTOLIC BLOOD PRESSURE: 62 MMHG

## 2025-02-07 DIAGNOSIS — J22 LRTI (LOWER RESPIRATORY TRACT INFECTION): ICD-10-CM

## 2025-02-07 DIAGNOSIS — R05.8 PRODUCTIVE COUGH: ICD-10-CM

## 2025-02-07 PROCEDURE — 3078F DIAST BP <80 MM HG: CPT | Performed by: STUDENT IN AN ORGANIZED HEALTH CARE EDUCATION/TRAINING PROGRAM

## 2025-02-07 PROCEDURE — 3074F SYST BP LT 130 MM HG: CPT | Performed by: STUDENT IN AN ORGANIZED HEALTH CARE EDUCATION/TRAINING PROGRAM

## 2025-02-07 PROCEDURE — 99213 OFFICE O/P EST LOW 20 MIN: CPT | Performed by: STUDENT IN AN ORGANIZED HEALTH CARE EDUCATION/TRAINING PROGRAM

## 2025-02-07 RX ORDER — AMOXICILLIN 500 MG/1
1000 CAPSULE ORAL 3 TIMES DAILY
Qty: 30 CAPSULE | Refills: 0 | Status: SHIPPED | OUTPATIENT
Start: 2025-02-07 | End: 2025-02-12

## 2025-02-07 RX ORDER — GUAIFENESIN 600 MG/1
600 TABLET, EXTENDED RELEASE ORAL EVERY 12 HOURS
COMMUNITY

## 2025-02-07 RX ORDER — LEVOTHYROXINE SODIUM 50 UG/1
TABLET ORAL
COMMUNITY

## 2025-02-07 ASSESSMENT — FIBROSIS 4 INDEX: FIB4 SCORE: 1.08

## 2025-02-07 NOTE — PROGRESS NOTES
Subjective:   Sdyney Mckeon is a 59 y.o. female who presents for Cough (Losing her voice x 1 week, not improving, cough, chest tightening, runny nose, had fever, tired)      HPI:  59-year-old female presents to urgent care for 7 days of upper respiratory symptoms and productive cough that has been worsening over the past few days.  Reports that she feels like there is chest congestion that she is not able to get up.  She has been using Mucinex without significant improvement.  She initially did have a fever but this has since resolved.  No nausea, vomiting, shortness of breath, wheezing.      Medications:    amoxicillin Caps  estradiol  guaiFENesin ER Tb12  levothyroxine Tabs  MELATONIN PO  MULTIVITAMIN ADULT PO  progesterone Caps  vitamin D Tabs    Allergies: Sulfa drugs and Vicodin [hydrocodone-acetaminophen]    Problem List: Sydney Mckeon does not have any pertinent problems on file.    Surgical History:  Past Surgical History:   Procedure Laterality Date    HYSTEROSCOPY NOVASURE-2 N/A 8/27/2018    Procedure: HYSTEROSCOPY NOVASURE;  Surgeon: Amira Dubois M.D.;  Location: SURGERY SAME DAY [facility];  Service: Gynecology    DILATION AND CURETTAGE N/A 8/27/2018    Procedure: DILATION AND CURETTAGE;  Surgeon: Amira Dubois M.D.;  Location: SURGERY SAME DAY [facility];  Service: Gynecology    APPENDECTOMY      emergent       Past Social Hx: Sydney Mckeon  reports that she has never smoked. She has never used smokeless tobacco. She reports that she does not drink alcohol and does not use drugs.     Past Family Hx:  Sydney Mckeon family history includes Cancer in her father; Cancer (age of onset: 50) in her mother; Dementia in her mother; Diabetes in her father and maternal grandmother; Heart Attack in her father; No Known Problems in her daughter; Other in her father; Thyroid in her father.     Problem list, medications, and allergies reviewed by myself today in Epic.     Objective:  "    /62 (BP Location: Left arm, Patient Position: Sitting, BP Cuff Size: Adult)   Pulse 74   Temp 36.6 °C (97.9 °F) (Temporal)   Resp 16   Ht 1.727 m (5' 8\")   Wt 54 kg (119 lb)   SpO2 100%   BMI 18.09 kg/m²     Physical Exam  Vitals reviewed.   HENT:      Right Ear: Tympanic membrane, ear canal and external ear normal.      Left Ear: Tympanic membrane, ear canal and external ear normal.      Nose: Congestion and rhinorrhea present.      Mouth/Throat:      Mouth: Mucous membranes are moist.      Pharynx: No posterior oropharyngeal erythema.   Cardiovascular:      Rate and Rhythm: Normal rate and regular rhythm.      Pulses: Normal pulses.      Heart sounds: Normal heart sounds. No murmur heard.  Pulmonary:      Effort: Pulmonary effort is normal. No respiratory distress.      Breath sounds: No stridor. Rhonchi present. No wheezing or rales.         Assessment/Plan:     Diagnosis and associated orders:     1. LRTI (lower respiratory tract infection)  amoxicillin (AMOXIL) 500 MG Cap      2. Productive cough           Comments/MDM:     Patient's presentation physical exam findings are consistent with infection following upper respiratory illness.  She does have rhonchi on exam primarily to the left upper lung field.  No wheezing or obvious Rales.  Given worsening cough, patient was started on course of amoxicillin which she is agreeable to.  Continue home supportive care.  Continue Mucinex as needed.  Vitals are stable.  Return precautions given.         Differential diagnosis, natural history, supportive care, and indications for immediate follow-up discussed.    Advised the patient to follow-up with the primary care physician for recheck, reevaluation, and consideration of further management.    Please note that this dictation was created using voice recognition software. I have made a reasonable attempt to correct obvious errors, but I expect that there are errors of grammar and possibly content that I " did not discover before finalizing the note.    Electronically signed by Tonio Herrera PA-C.

## 2025-02-19 ENCOUNTER — APPOINTMENT (OUTPATIENT)
Dept: URBAN - METROPOLITAN AREA CLINIC 35 | Facility: CLINIC | Age: 60
Setting detail: DERMATOLOGY
End: 2025-02-19

## 2025-02-19 DIAGNOSIS — D22 MELANOCYTIC NEVI: ICD-10-CM

## 2025-02-19 PROBLEM — D22.5 MELANOCYTIC NEVI OF TRUNK: Status: ACTIVE | Noted: 2025-02-19

## 2025-02-19 PROCEDURE — ? ADDITIONAL NOTES

## 2025-02-19 PROCEDURE — ? COUNSELING

## 2025-02-19 PROCEDURE — 99212 OFFICE O/P EST SF 10 MIN: CPT

## 2025-02-19 ASSESSMENT — LOCATION DETAILED DESCRIPTION DERM: LOCATION DETAILED: LEFT LATERAL SUPERIOR CHEST

## 2025-02-19 ASSESSMENT — LOCATION ZONE DERM: LOCATION ZONE: TRUNK

## 2025-02-19 ASSESSMENT — LOCATION SIMPLE DESCRIPTION DERM: LOCATION SIMPLE: CHEST

## 2025-02-19 NOTE — PROCEDURE: ADDITIONAL NOTES
Render Risk Assessment In Note?: no
Detail Level: Simple
Additional Notes: Biopsy proven. Will continue to monitor.

## 2025-04-30 ENCOUNTER — HOSPITAL ENCOUNTER (OUTPATIENT)
Dept: RADIOLOGY | Facility: MEDICAL CENTER | Age: 60
End: 2025-04-30
Attending: OBSTETRICS & GYNECOLOGY
Payer: COMMERCIAL

## 2025-04-30 ENCOUNTER — HOSPITAL ENCOUNTER (OUTPATIENT)
Dept: LAB | Facility: MEDICAL CENTER | Age: 60
End: 2025-04-30
Attending: HEALTH EDUCATOR
Payer: COMMERCIAL

## 2025-04-30 DIAGNOSIS — Z12.31 VISIT FOR SCREENING MAMMOGRAM: ICD-10-CM

## 2025-04-30 DIAGNOSIS — R92.321 SCATTERED FIBROGLANDULAR TISSUE DENSITY OF RIGHT BREAST ON MAMMOGRAPHY: ICD-10-CM

## 2025-04-30 LAB
BASOPHILS # BLD AUTO: 0.7 % (ref 0–1.8)
BASOPHILS # BLD: 0.04 K/UL (ref 0–0.12)
EOSINOPHIL # BLD AUTO: 0.01 K/UL (ref 0–0.51)
EOSINOPHIL NFR BLD: 0.2 % (ref 0–6.9)
ERYTHROCYTE [DISTWIDTH] IN BLOOD BY AUTOMATED COUNT: 47.6 FL (ref 35.9–50)
ESTRADIOL SERPL-MCNC: 75.9 PG/ML
FSH SERPL-ACNC: 22.5 MIU/ML
HCT VFR BLD AUTO: 41.7 % (ref 37–47)
HGB BLD-MCNC: 13.5 G/DL (ref 12–16)
IMM GRANULOCYTES # BLD AUTO: 0.01 K/UL (ref 0–0.11)
IMM GRANULOCYTES NFR BLD AUTO: 0.2 % (ref 0–0.9)
LH SERPL-ACNC: 15.3 IU/L
LYMPHOCYTES # BLD AUTO: 1.05 K/UL (ref 1–4.8)
LYMPHOCYTES NFR BLD: 18.3 % (ref 22–41)
MCH RBC QN AUTO: 31.3 PG (ref 27–33)
MCHC RBC AUTO-ENTMCNC: 32.4 G/DL (ref 32.2–35.5)
MCV RBC AUTO: 96.5 FL (ref 81.4–97.8)
MONOCYTES # BLD AUTO: 0.36 K/UL (ref 0–0.85)
MONOCYTES NFR BLD AUTO: 6.3 % (ref 0–13.4)
NEUTROPHILS # BLD AUTO: 4.28 K/UL (ref 1.82–7.42)
NEUTROPHILS NFR BLD: 74.3 % (ref 44–72)
NRBC # BLD AUTO: 0 K/UL
NRBC BLD-RTO: 0 /100 WBC (ref 0–0.2)
PLATELET # BLD AUTO: 235 K/UL (ref 164–446)
PMV BLD AUTO: 12.6 FL (ref 9–12.9)
RBC # BLD AUTO: 4.32 M/UL (ref 4.2–5.4)
T3FREE SERPL-MCNC: 2.65 PG/ML (ref 2–4.4)
T4 FREE SERPL-MCNC: 1.5 NG/DL (ref 0.93–1.7)
TSH SERPL-ACNC: 1.65 UIU/ML (ref 0.38–5.33)
WBC # BLD AUTO: 5.8 K/UL (ref 4.8–10.8)

## 2025-04-30 PROCEDURE — 84481 FREE ASSAY (FT-3): CPT

## 2025-04-30 PROCEDURE — 84403 ASSAY OF TOTAL TESTOSTERONE: CPT

## 2025-04-30 PROCEDURE — 83001 ASSAY OF GONADOTROPIN (FSH): CPT

## 2025-04-30 PROCEDURE — 84270 ASSAY OF SEX HORMONE GLOBUL: CPT

## 2025-04-30 PROCEDURE — 83002 ASSAY OF GONADOTROPIN (LH): CPT

## 2025-04-30 PROCEDURE — 82670 ASSAY OF TOTAL ESTRADIOL: CPT

## 2025-04-30 PROCEDURE — 84443 ASSAY THYROID STIM HORMONE: CPT

## 2025-04-30 PROCEDURE — 36415 COLL VENOUS BLD VENIPUNCTURE: CPT

## 2025-04-30 PROCEDURE — 85025 COMPLETE CBC W/AUTO DIFF WBC: CPT

## 2025-04-30 PROCEDURE — 77067 SCR MAMMO BI INCL CAD: CPT

## 2025-04-30 PROCEDURE — 76641 ULTRASOUND BREAST COMPLETE: CPT

## 2025-04-30 PROCEDURE — 84439 ASSAY OF FREE THYROXINE: CPT

## 2025-05-02 LAB — SHBG SERPL-SCNC: 122 NMOL/L (ref 17–125)

## 2025-05-05 LAB — TESTOST SERPL-MCNC: 205 NG/DL (ref 9–55)

## 2025-07-23 ENCOUNTER — APPOINTMENT (OUTPATIENT)
Dept: URBAN - METROPOLITAN AREA CLINIC 35 | Facility: CLINIC | Age: 60
Setting detail: DERMATOLOGY
End: 2025-07-23

## 2025-07-23 DIAGNOSIS — D485 NEOPLASM OF UNCERTAIN BEHAVIOR OF SKIN: ICD-10-CM

## 2025-07-23 DIAGNOSIS — Z71.89 OTHER SPECIFIED COUNSELING: ICD-10-CM

## 2025-07-23 DIAGNOSIS — L81.4 OTHER MELANIN HYPERPIGMENTATION: ICD-10-CM

## 2025-07-23 DIAGNOSIS — L82.1 OTHER SEBORRHEIC KERATOSIS: ICD-10-CM

## 2025-07-23 DIAGNOSIS — D22 MELANOCYTIC NEVI: ICD-10-CM

## 2025-07-23 PROBLEM — D22.4 MELANOCYTIC NEVI OF SCALP AND NECK: Status: ACTIVE | Noted: 2025-07-23

## 2025-07-23 PROBLEM — D48.5 NEOPLASM OF UNCERTAIN BEHAVIOR OF SKIN: Status: ACTIVE | Noted: 2025-07-23

## 2025-07-23 PROCEDURE — ? COUNSELING

## 2025-07-23 PROCEDURE — ? PHOTO-DOCUMENTATION

## 2025-07-23 PROCEDURE — ? ADDITIONAL NOTES

## 2025-07-23 ASSESSMENT — LOCATION DETAILED DESCRIPTION DERM
LOCATION DETAILED: RIGHT POSTERIOR SHOULDER
LOCATION DETAILED: LEFT ULNAR DORSAL HAND
LOCATION DETAILED: LEFT SUPERIOR PARIETAL SCALP
LOCATION DETAILED: RIGHT SUPERIOR UPPER BACK
LOCATION DETAILED: RIGHT MEDIAL TRAPEZIAL NECK
LOCATION DETAILED: RIGHT INFERIOR POSTERIOR NECK

## 2025-07-23 ASSESSMENT — LOCATION SIMPLE DESCRIPTION DERM
LOCATION SIMPLE: RIGHT UPPER BACK
LOCATION SIMPLE: POSTERIOR NECK
LOCATION SIMPLE: RIGHT SHOULDER
LOCATION SIMPLE: SCALP
LOCATION SIMPLE: LEFT HAND

## 2025-07-23 ASSESSMENT — LOCATION ZONE DERM
LOCATION ZONE: SCALP
LOCATION ZONE: HAND
LOCATION ZONE: NECK
LOCATION ZONE: ARM
LOCATION ZONE: TRUNK

## (undated) DEVICE — KIT  I.V. START (100EA/CA)

## (undated) DEVICE — KIT ANESTHESIA W/CIRCUIT & 3/LT BAG W/FILTER (20EA/CA)

## (undated) DEVICE — PAD SANITARY 11IN MAXI IND WRAPPED  (12EA/PK 24PK/CA)

## (undated) DEVICE — TUBING INFLOW HYSTEROSCOPY (10EA/CA)

## (undated) DEVICE — HEAD HOLDER JUNIOR/ADULT

## (undated) DEVICE — SET LEADWIRE 5 LEAD BEDSIDE DISPOSABLE ECG (1SET OF 5/EA)

## (undated) DEVICE — TUBING OUTFLOW HYSTEROSCPY (10EA/BX)

## (undated) DEVICE — PROTECTOR ULNA NERVE - (36PR/CA)

## (undated) DEVICE — TUBE CONNECTING SUCTION - CLEAR PLASTIC STERILE 72 IN (50EA/CA)

## (undated) DEVICE — CANISTER SUCTION RIGID RED 1500CC (40EA/CA)

## (undated) DEVICE — Device

## (undated) DEVICE — ELECTRODE 850 FOAM ADHESIVE - HYDROGEL RADIOTRNSPRNT (50/PK)

## (undated) DEVICE — WATER IRRIGATION STERILE 1000ML (12EA/CA)

## (undated) DEVICE — SYRINGE 10 ML CONTROL LL (25EA/BX 4BX/CA)

## (undated) DEVICE — SODIUM CHL IRRIGATION 0.9% 1000ML (12EA/CA)

## (undated) DEVICE — LACTATED RINGERS INJ 1000 ML - (14EA/CA 60CA/PF)

## (undated) DEVICE — NEEDLE SPINAL NON-SAFETY 22 GA X 3 (25EA/BX)"

## (undated) DEVICE — ADVANCED NOVASURE STERILE DEVICE (3EA/PK)

## (undated) DEVICE — CANISTER SUCTION 3000ML MECHANICAL FILTER AUTO SHUTOFF MEDI-VAC NONSTERILE LF DISP  (40EA/CA)

## (undated) DEVICE — SENSOR SPO2 NEO LNCS ADHESIVE (20/BX) SEE USER NOTES

## (undated) DEVICE — NEPTUNE 4 PORT MANIFOLD - (20/PK)

## (undated) DEVICE — GLOVE SZ 6 BIOGEL PI MICRO - PF LF (50PR/BX 4BX/CA)

## (undated) DEVICE — TRAY SRGPRP PVP IOD WT PRP - (20/CA)

## (undated) DEVICE — CHLORAPREP 26 ML APPLICATOR - ORANGE TINT(25/CA)

## (undated) DEVICE — ELECTRODE DUAL RETURN W/ CORD - (50/PK)

## (undated) DEVICE — SUCTION INSTRUMENT YANKAUER BULBOUS TIP W/O VENT (50EA/CA)

## (undated) DEVICE — DRAPE UNDER BUTTOCKS FLUID - (20/CA)

## (undated) DEVICE — MASK ANESTHESIA ADULT  - (100/CA)

## (undated) DEVICE — TUBING CLEARLINK DUO-VENT - C-FLO (48EA/CA)

## (undated) DEVICE — DRESSING NON ADHERENT 3 X 4 - STERILE (100/BX 12BX/CA)

## (undated) DEVICE — GLOVE BIOGEL SZ 6.5 SURGICAL PF LTX (50PR/BX 4BX/CA)

## (undated) DEVICE — GOWN WARMING STANDARD FLEX - (30/CA)

## (undated) DEVICE — CATHETER IV 20 GA X 1-1/4 ---SURG.& SDS ONLY--- (50EA/BX)

## (undated) DEVICE — SODIUM CHL. IRRIGATION 0.9% 3000ML (4EA/CA 65CA/PF)